# Patient Record
Sex: MALE | Race: WHITE | Employment: UNEMPLOYED | URBAN - METROPOLITAN AREA
[De-identification: names, ages, dates, MRNs, and addresses within clinical notes are randomized per-mention and may not be internally consistent; named-entity substitution may affect disease eponyms.]

---

## 2017-09-06 ENCOUNTER — APPOINTMENT (OUTPATIENT)
Dept: GENERAL RADIOLOGY | Age: 47
DRG: 473 | End: 2017-09-06
Payer: COMMERCIAL

## 2017-09-06 ENCOUNTER — APPOINTMENT (OUTPATIENT)
Dept: CT IMAGING | Age: 47
DRG: 473 | End: 2017-09-06
Payer: COMMERCIAL

## 2017-09-06 ENCOUNTER — APPOINTMENT (OUTPATIENT)
Dept: MRI IMAGING | Age: 47
DRG: 473 | End: 2017-09-06
Payer: COMMERCIAL

## 2017-09-06 ENCOUNTER — HOSPITAL ENCOUNTER (INPATIENT)
Age: 47
LOS: 1 days | Discharge: HOME | DRG: 473 | End: 2017-09-08
Attending: EMERGENCY MEDICINE | Admitting: SURGERY
Payer: COMMERCIAL

## 2017-09-06 DIAGNOSIS — S12.501A CLOSED NONDISPLACED FRACTURE OF SIXTH CERVICAL VERTEBRA, UNSPECIFIED FRACTURE MORPHOLOGY, INITIAL ENCOUNTER (HCC): Primary | ICD-10-CM

## 2017-09-06 PROBLEM — S01.01XA SCALP LACERATION: Status: ACTIVE | Noted: 2017-09-06

## 2017-09-06 PROBLEM — S12.9XXA CERVICAL TRANSVERSE PROCESS FRACTURE (HCC): Status: ACTIVE | Noted: 2017-09-06

## 2017-09-06 LAB
ABSOLUTE EOS #: 0.1 K/UL (ref 0–0.4)
ABSOLUTE LYMPH #: 2 K/UL (ref 1–4.8)
ABSOLUTE MONO #: 1.3 K/UL (ref 0.1–1.2)
ANION GAP SERPL CALCULATED.3IONS-SCNC: 13 MMOL/L (ref 9–17)
BASOPHILS # BLD: 0 %
BASOPHILS ABSOLUTE: 0 K/UL (ref 0–0.2)
BUN BLDV-MCNC: 15 MG/DL (ref 6–20)
BUN/CREAT BLD: ABNORMAL (ref 9–20)
CALCIUM SERPL-MCNC: 8.5 MG/DL (ref 8.6–10.4)
CHLORIDE BLD-SCNC: 102 MMOL/L (ref 98–107)
CO2: 23 MMOL/L (ref 20–31)
COLLAGEN ADENOSINE-5'-DIPHOSPHATE (ADP) TIME: 76 SEC (ref 67–112)
COLLAGEN EPINEPHRINE TIME: 80 SEC (ref 85–172)
CREAT SERPL-MCNC: 0.91 MG/DL (ref 0.7–1.2)
DIFFERENTIAL TYPE: ABNORMAL
EOSINOPHILS RELATIVE PERCENT: 1 %
GFR AFRICAN AMERICAN: >60 ML/MIN
GFR NON-AFRICAN AMERICAN: >60 ML/MIN
GFR SERPL CREATININE-BSD FRML MDRD: ABNORMAL ML/MIN/{1.73_M2}
GFR SERPL CREATININE-BSD FRML MDRD: ABNORMAL ML/MIN/{1.73_M2}
GLUCOSE BLD-MCNC: 139 MG/DL (ref 70–99)
HCT VFR BLD CALC: 43.8 % (ref 41–53)
HEMOGLOBIN: 15.1 G/DL (ref 13.5–17.5)
INR BLD: 1
LYMPHOCYTES # BLD: 15 %
MCH RBC QN AUTO: 30.4 PG (ref 26–34)
MCHC RBC AUTO-ENTMCNC: 34.4 G/DL (ref 31–37)
MCV RBC AUTO: 88.3 FL (ref 80–100)
MONOCYTES # BLD: 9 %
PARTIAL THROMBOPLASTIN TIME: 25.5 SEC (ref 21.3–31.3)
PDW BLD-RTO: 13.7 % (ref 12.5–15.4)
PLATELET # BLD: 207 K/UL (ref 140–450)
PLATELET ESTIMATE: ABNORMAL
PLATELET FUNCTION INTERP: ABNORMAL
PMV BLD AUTO: 7.3 FL (ref 6–12)
POTASSIUM SERPL-SCNC: 3.8 MMOL/L (ref 3.7–5.3)
PROTHROMBIN TIME: 10.4 SEC (ref 9.4–12.6)
RBC # BLD: 4.96 M/UL (ref 4.5–5.9)
RBC # BLD: ABNORMAL 10*6/UL
SEG NEUTROPHILS: 75 %
SEGMENTED NEUTROPHILS ABSOLUTE COUNT: 10.2 K/UL (ref 1.8–7.7)
SODIUM BLD-SCNC: 138 MMOL/L (ref 135–144)
WBC # BLD: 13.6 K/UL (ref 3.5–11)
WBC # BLD: ABNORMAL 10*3/UL

## 2017-09-06 PROCEDURE — 71010 XR CHEST PORTABLE: CPT

## 2017-09-06 PROCEDURE — 85025 COMPLETE CBC W/AUTO DIFF WBC: CPT

## 2017-09-06 PROCEDURE — 6370000000 HC RX 637 (ALT 250 FOR IP): Performed by: STUDENT IN AN ORGANIZED HEALTH CARE EDUCATION/TRAINING PROGRAM

## 2017-09-06 PROCEDURE — 99221 1ST HOSP IP/OBS SF/LOW 40: CPT | Performed by: NEUROLOGICAL SURGERY

## 2017-09-06 PROCEDURE — G0378 HOSPITAL OBSERVATION PER HR: HCPCS

## 2017-09-06 PROCEDURE — 73030 X-RAY EXAM OF SHOULDER: CPT

## 2017-09-06 PROCEDURE — 85576 BLOOD PLATELET AGGREGATION: CPT

## 2017-09-06 PROCEDURE — 2580000003 HC RX 258: Performed by: STUDENT IN AN ORGANIZED HEALTH CARE EDUCATION/TRAINING PROGRAM

## 2017-09-06 PROCEDURE — 73060 X-RAY EXAM OF HUMERUS: CPT

## 2017-09-06 PROCEDURE — 2500000003 HC RX 250 WO HCPCS: Performed by: STUDENT IN AN ORGANIZED HEALTH CARE EDUCATION/TRAINING PROGRAM

## 2017-09-06 PROCEDURE — 72141 MRI NECK SPINE W/O DYE: CPT

## 2017-09-06 PROCEDURE — 99285 EMERGENCY DEPT VISIT HI MDM: CPT

## 2017-09-06 PROCEDURE — 6360000002 HC RX W HCPCS: Performed by: EMERGENCY MEDICINE

## 2017-09-06 PROCEDURE — 70498 CT ANGIOGRAPHY NECK: CPT

## 2017-09-06 PROCEDURE — 85730 THROMBOPLASTIN TIME PARTIAL: CPT

## 2017-09-06 PROCEDURE — 6360000004 HC RX CONTRAST MEDICATION: Performed by: EMERGENCY MEDICINE

## 2017-09-06 PROCEDURE — 85610 PROTHROMBIN TIME: CPT

## 2017-09-06 PROCEDURE — 36415 COLL VENOUS BLD VENIPUNCTURE: CPT

## 2017-09-06 PROCEDURE — 80048 BASIC METABOLIC PNL TOTAL CA: CPT

## 2017-09-06 PROCEDURE — 96375 TX/PRO/DX INJ NEW DRUG ADDON: CPT

## 2017-09-06 PROCEDURE — 93005 ELECTROCARDIOGRAM TRACING: CPT

## 2017-09-06 RX ORDER — ACETAMINOPHEN 325 MG/1
650 TABLET ORAL EVERY 4 HOURS PRN
Status: DISCONTINUED | OUTPATIENT
Start: 2017-09-06 | End: 2017-09-08 | Stop reason: HOSPADM

## 2017-09-06 RX ORDER — MORPHINE SULFATE 4 MG/ML
4 INJECTION, SOLUTION INTRAMUSCULAR; INTRAVENOUS ONCE
Status: COMPLETED | OUTPATIENT
Start: 2017-09-06 | End: 2017-09-06

## 2017-09-06 RX ORDER — OXYCODONE HYDROCHLORIDE 5 MG/1
5 TABLET ORAL EVERY 4 HOURS PRN
Status: DISCONTINUED | OUTPATIENT
Start: 2017-09-06 | End: 2017-09-08 | Stop reason: HOSPADM

## 2017-09-06 RX ORDER — OXYCODONE HYDROCHLORIDE 5 MG/1
10 TABLET ORAL EVERY 4 HOURS PRN
Status: DISCONTINUED | OUTPATIENT
Start: 2017-09-06 | End: 2017-09-08 | Stop reason: HOSPADM

## 2017-09-06 RX ORDER — SODIUM CHLORIDE 0.9 % (FLUSH) 0.9 %
10 SYRINGE (ML) INJECTION EVERY 12 HOURS SCHEDULED
Status: DISCONTINUED | OUTPATIENT
Start: 2017-09-06 | End: 2017-09-08 | Stop reason: HOSPADM

## 2017-09-06 RX ORDER — ONDANSETRON 2 MG/ML
4 INJECTION INTRAMUSCULAR; INTRAVENOUS EVERY 6 HOURS PRN
Status: DISCONTINUED | OUTPATIENT
Start: 2017-09-06 | End: 2017-09-08 | Stop reason: HOSPADM

## 2017-09-06 RX ORDER — ONDANSETRON 2 MG/ML
4 INJECTION INTRAMUSCULAR; INTRAVENOUS ONCE
Status: COMPLETED | OUTPATIENT
Start: 2017-09-06 | End: 2017-09-06

## 2017-09-06 RX ORDER — LIDOCAINE HYDROCHLORIDE AND EPINEPHRINE 10; 10 MG/ML; UG/ML
20 INJECTION, SOLUTION INFILTRATION; PERINEURAL ONCE
Status: COMPLETED | OUTPATIENT
Start: 2017-09-06 | End: 2017-09-06

## 2017-09-06 RX ORDER — SODIUM CHLORIDE 9 MG/ML
INJECTION, SOLUTION INTRAVENOUS CONTINUOUS
Status: DISCONTINUED | OUTPATIENT
Start: 2017-09-07 | End: 2017-09-08

## 2017-09-06 RX ORDER — MORPHINE SULFATE 4 MG/ML
4 INJECTION, SOLUTION INTRAMUSCULAR; INTRAVENOUS
Status: DISCONTINUED | OUTPATIENT
Start: 2017-09-06 | End: 2017-09-08

## 2017-09-06 RX ORDER — SODIUM CHLORIDE 0.9 % (FLUSH) 0.9 %
10 SYRINGE (ML) INJECTION PRN
Status: DISCONTINUED | OUTPATIENT
Start: 2017-09-06 | End: 2017-09-08 | Stop reason: HOSPADM

## 2017-09-06 RX ORDER — MORPHINE SULFATE 2 MG/ML
2 INJECTION, SOLUTION INTRAMUSCULAR; INTRAVENOUS
Status: DISCONTINUED | OUTPATIENT
Start: 2017-09-06 | End: 2017-09-08 | Stop reason: HOSPADM

## 2017-09-06 RX ADMIN — MORPHINE SULFATE 4 MG: 4 INJECTION, SOLUTION INTRAMUSCULAR; INTRAVENOUS at 09:58

## 2017-09-06 RX ADMIN — OXYCODONE HYDROCHLORIDE 5 MG: 5 TABLET ORAL at 20:06

## 2017-09-06 RX ADMIN — IOVERSOL 90 ML: 741 INJECTION INTRA-ARTERIAL; INTRAVENOUS at 08:15

## 2017-09-06 RX ADMIN — ONDANSETRON 4 MG: 2 INJECTION, SOLUTION INTRAMUSCULAR; INTRAVENOUS at 09:57

## 2017-09-06 RX ADMIN — OXYCODONE HYDROCHLORIDE 5 MG: 5 TABLET ORAL at 23:37

## 2017-09-06 RX ADMIN — SODIUM CHLORIDE, PRESERVATIVE FREE 10 ML: 5 INJECTION INTRAVENOUS at 20:06

## 2017-09-06 RX ADMIN — LIDOCAINE HYDROCHLORIDE,EPINEPHRINE BITARTRATE 20 ML: 10; .01 INJECTION, SOLUTION INFILTRATION; PERINEURAL at 09:00

## 2017-09-06 RX ADMIN — OXYCODONE HYDROCHLORIDE 5 MG: 5 TABLET ORAL at 12:30

## 2017-09-06 ASSESSMENT — ENCOUNTER SYMPTOMS
SORE THROAT: 0
EYE PAIN: 0
NAUSEA: 0
BACK PAIN: 1
ABDOMINAL PAIN: 0
PHOTOPHOBIA: 0
COUGH: 0
SHORTNESS OF BREATH: 0

## 2017-09-06 ASSESSMENT — PAIN DESCRIPTION - LOCATION
LOCATION: ARM
LOCATION: ARM

## 2017-09-06 ASSESSMENT — PAIN DESCRIPTION - FREQUENCY: FREQUENCY: CONTINUOUS

## 2017-09-06 ASSESSMENT — PAIN DESCRIPTION - DESCRIPTORS
DESCRIPTORS: SORE
DESCRIPTORS: SORE

## 2017-09-06 ASSESSMENT — PAIN SCALES - GENERAL
PAINLEVEL_OUTOF10: 4
PAINLEVEL_OUTOF10: 5
PAINLEVEL_OUTOF10: 5
PAINLEVEL_OUTOF10: 7
PAINLEVEL_OUTOF10: 4
PAINLEVEL_OUTOF10: 3
PAINLEVEL_OUTOF10: 3
PAINLEVEL_OUTOF10: 8

## 2017-09-06 ASSESSMENT — PAIN DESCRIPTION - ORIENTATION
ORIENTATION: LEFT
ORIENTATION: RIGHT

## 2017-09-06 ASSESSMENT — PAIN DESCRIPTION - PAIN TYPE
TYPE: ACUTE PAIN
TYPE: ACUTE PAIN

## 2017-09-06 ASSESSMENT — PAIN DESCRIPTION - PROGRESSION: CLINICAL_PROGRESSION: GRADUALLY IMPROVING

## 2017-09-06 ASSESSMENT — PAIN DESCRIPTION - ONSET: ONSET: ON-GOING

## 2017-09-07 ENCOUNTER — ANESTHESIA (OUTPATIENT)
Dept: OPERATING ROOM | Age: 47
DRG: 473 | End: 2017-09-07
Payer: COMMERCIAL

## 2017-09-07 ENCOUNTER — APPOINTMENT (OUTPATIENT)
Dept: GENERAL RADIOLOGY | Age: 47
DRG: 473 | End: 2017-09-07
Payer: COMMERCIAL

## 2017-09-07 ENCOUNTER — ANESTHESIA EVENT (OUTPATIENT)
Dept: OPERATING ROOM | Age: 47
DRG: 473 | End: 2017-09-07
Payer: COMMERCIAL

## 2017-09-07 VITALS — OXYGEN SATURATION: 100 % | DIASTOLIC BLOOD PRESSURE: 68 MMHG | SYSTOLIC BLOOD PRESSURE: 107 MMHG | TEMPERATURE: 96.8 F

## 2017-09-07 LAB
ANION GAP SERPL CALCULATED.3IONS-SCNC: 9 MMOL/L (ref 9–17)
BILIRUBIN URINE: NEGATIVE
BUN BLDV-MCNC: 14 MG/DL (ref 6–20)
BUN/CREAT BLD: ABNORMAL (ref 9–20)
CALCIUM SERPL-MCNC: 8.2 MG/DL (ref 8.6–10.4)
CHLORIDE BLD-SCNC: 103 MMOL/L (ref 98–107)
CO2: 27 MMOL/L (ref 20–31)
COLOR: ABNORMAL
COMMENT UA: ABNORMAL
CREAT SERPL-MCNC: 0.88 MG/DL (ref 0.7–1.2)
EKG ATRIAL RATE: 77 BPM
EKG P AXIS: 69 DEGREES
EKG P-R INTERVAL: 130 MS
EKG Q-T INTERVAL: 376 MS
EKG QRS DURATION: 106 MS
EKG QTC CALCULATION (BAZETT): 425 MS
EKG R AXIS: 24 DEGREES
EKG T AXIS: 59 DEGREES
EKG VENTRICULAR RATE: 77 BPM
GFR AFRICAN AMERICAN: >60 ML/MIN
GFR NON-AFRICAN AMERICAN: >60 ML/MIN
GFR SERPL CREATININE-BSD FRML MDRD: ABNORMAL ML/MIN/{1.73_M2}
GFR SERPL CREATININE-BSD FRML MDRD: ABNORMAL ML/MIN/{1.73_M2}
GLUCOSE BLD-MCNC: 107 MG/DL (ref 70–99)
GLUCOSE URINE: NEGATIVE
HCT VFR BLD CALC: 43.2 % (ref 41–53)
HCT VFR BLD CALC: 44.6 % (ref 41–53)
HEMOGLOBIN: 14.8 G/DL (ref 13.5–17.5)
HEMOGLOBIN: 15.2 G/DL (ref 13.5–17.5)
KETONES, URINE: NEGATIVE
LEUKOCYTE ESTERASE, URINE: NEGATIVE
MCH RBC QN AUTO: 30.5 PG (ref 26–34)
MCHC RBC AUTO-ENTMCNC: 34.2 G/DL (ref 31–37)
MCV RBC AUTO: 89.2 FL (ref 80–100)
NITRITE, URINE: NEGATIVE
PDW BLD-RTO: 13.9 % (ref 12.5–15.4)
PH UA: 5.5 (ref 5–8)
PLATELET # BLD: 192 K/UL (ref 140–450)
PMV BLD AUTO: 7.4 FL (ref 6–12)
POTASSIUM SERPL-SCNC: 4.3 MMOL/L (ref 3.7–5.3)
PROTEIN UA: NEGATIVE
RBC # BLD: 4.85 M/UL (ref 4.5–5.9)
SODIUM BLD-SCNC: 139 MMOL/L (ref 135–144)
SPECIFIC GRAVITY UA: 1.02 (ref 1–1.03)
TURBIDITY: CLEAR
URINE HGB: NEGATIVE
UROBILINOGEN, URINE: NORMAL
WBC # BLD: 11.5 K/UL (ref 3.5–11)

## 2017-09-07 PROCEDURE — C1713 ANCHOR/SCREW BN/BN,TIS/BN: HCPCS | Performed by: NEUROLOGICAL SURGERY

## 2017-09-07 PROCEDURE — 3600000014 HC SURGERY LEVEL 4 ADDTL 15MIN: Performed by: NEUROLOGICAL SURGERY

## 2017-09-07 PROCEDURE — 2500000003 HC RX 250 WO HCPCS: Performed by: NURSE ANESTHETIST, CERTIFIED REGISTERED

## 2017-09-07 PROCEDURE — 6360000002 HC RX W HCPCS: Performed by: NURSE ANESTHETIST, CERTIFIED REGISTERED

## 2017-09-07 PROCEDURE — 3700000000 HC ANESTHESIA ATTENDED CARE: Performed by: NEUROLOGICAL SURGERY

## 2017-09-07 PROCEDURE — 92523 SPEECH SOUND LANG COMPREHEN: CPT

## 2017-09-07 PROCEDURE — 6370000000 HC RX 637 (ALT 250 FOR IP): Performed by: REGISTERED NURSE

## 2017-09-07 PROCEDURE — 7100000001 HC PACU RECOVERY - ADDTL 15 MIN: Performed by: NEUROLOGICAL SURGERY

## 2017-09-07 PROCEDURE — 87086 URINE CULTURE/COLONY COUNT: CPT

## 2017-09-07 PROCEDURE — 6360000002 HC RX W HCPCS: Performed by: ANESTHESIOLOGY

## 2017-09-07 PROCEDURE — 36415 COLL VENOUS BLD VENIPUNCTURE: CPT

## 2017-09-07 PROCEDURE — 2580000003 HC RX 258: Performed by: NURSE ANESTHETIST, CERTIFIED REGISTERED

## 2017-09-07 PROCEDURE — 6360000002 HC RX W HCPCS: Performed by: REGISTERED NURSE

## 2017-09-07 PROCEDURE — 7100000000 HC PACU RECOVERY - FIRST 15 MIN: Performed by: NEUROLOGICAL SURGERY

## 2017-09-07 PROCEDURE — 0RT30ZZ RESECTION OF CERVICAL VERTEBRAL DISC, OPEN APPROACH: ICD-10-PCS | Performed by: NEUROLOGICAL SURGERY

## 2017-09-07 PROCEDURE — 99024 POSTOP FOLLOW-UP VISIT: CPT | Performed by: NEUROLOGICAL SURGERY

## 2017-09-07 PROCEDURE — 3600000004 HC SURGERY LEVEL 4 BASE: Performed by: NEUROLOGICAL SURGERY

## 2017-09-07 PROCEDURE — 85027 COMPLETE CBC AUTOMATED: CPT

## 2017-09-07 PROCEDURE — 81003 URINALYSIS AUTO W/O SCOPE: CPT

## 2017-09-07 PROCEDURE — 2720000010 HC SURG SUPPLY STERILE: Performed by: NEUROLOGICAL SURGERY

## 2017-09-07 PROCEDURE — 1200000000 HC SEMI PRIVATE

## 2017-09-07 PROCEDURE — C1729 CATH, DRAINAGE: HCPCS | Performed by: NEUROLOGICAL SURGERY

## 2017-09-07 PROCEDURE — 96365 THER/PROPH/DIAG IV INF INIT: CPT

## 2017-09-07 PROCEDURE — 3700000001 HC ADD 15 MINUTES (ANESTHESIA): Performed by: NEUROLOGICAL SURGERY

## 2017-09-07 PROCEDURE — 2500000003 HC RX 250 WO HCPCS: Performed by: NEUROLOGICAL SURGERY

## 2017-09-07 PROCEDURE — 85018 HEMOGLOBIN: CPT

## 2017-09-07 PROCEDURE — 6370000000 HC RX 637 (ALT 250 FOR IP): Performed by: NEUROLOGICAL SURGERY

## 2017-09-07 PROCEDURE — 2580000003 HC RX 258: Performed by: REGISTERED NURSE

## 2017-09-07 PROCEDURE — A4364 ADHESIVE, LIQUID OR EQUAL: HCPCS | Performed by: NEUROLOGICAL SURGERY

## 2017-09-07 PROCEDURE — 2580000003 HC RX 258: Performed by: STUDENT IN AN ORGANIZED HEALTH CARE EDUCATION/TRAINING PROGRAM

## 2017-09-07 PROCEDURE — L8699 PROSTHETIC IMPLANT NOS: HCPCS | Performed by: NEUROLOGICAL SURGERY

## 2017-09-07 PROCEDURE — 2580000003 HC RX 258: Performed by: NEUROLOGICAL SURGERY

## 2017-09-07 PROCEDURE — 72040 X-RAY EXAM NECK SPINE 2-3 VW: CPT

## 2017-09-07 PROCEDURE — G9165 ATTEN CURRENT STATUS: HCPCS

## 2017-09-07 PROCEDURE — 80048 BASIC METABOLIC PNL TOTAL CA: CPT

## 2017-09-07 PROCEDURE — 22853 INSJ BIOMECHANICAL DEVICE: CPT | Performed by: NEUROLOGICAL SURGERY

## 2017-09-07 PROCEDURE — XRG2092 FUSION OF 2 OR MORE CERVICAL VERTEBRAL JOINTS USING NANOTEXTURED SURFACE INTERBODY FUSION DEVICE, OPEN APPROACH, NEW TECHNOLOGY GROUP 2: ICD-10-PCS | Performed by: NEUROLOGICAL SURGERY

## 2017-09-07 PROCEDURE — 22856 TOT DISC ARTHRP 1NTRSPC CRV: CPT | Performed by: NEUROLOGICAL SURGERY

## 2017-09-07 PROCEDURE — 22551 ARTHRD ANT NTRBDY CERVICAL: CPT | Performed by: NEUROLOGICAL SURGERY

## 2017-09-07 PROCEDURE — 85014 HEMATOCRIT: CPT

## 2017-09-07 PROCEDURE — L0190 CERV COLLAR SUPP ADJ CERV BA: HCPCS | Performed by: NEUROLOGICAL SURGERY

## 2017-09-07 PROCEDURE — 22845 INSERT SPINE FIXATION DEVICE: CPT | Performed by: NEUROLOGICAL SURGERY

## 2017-09-07 PROCEDURE — 2780000010 HC IMPLANT OTHER: Performed by: NEUROLOGICAL SURGERY

## 2017-09-07 PROCEDURE — G9166 ATTEN GOAL STATUS: HCPCS

## 2017-09-07 DEVICE — IMPLANTABLE DEVICE
Type: IMPLANTABLE DEVICE | Status: FUNCTIONAL
Brand: TRINICA® TRINICA®

## 2017-09-07 DEVICE — IMPLANTABLE DEVICE: Type: IMPLANTABLE DEVICE | Status: FUNCTIONAL

## 2017-09-07 DEVICE — IMPLANTABLE DEVICE
Type: IMPLANTABLE DEVICE | Status: FUNCTIONAL
Brand: TRABECULAR METAL™

## 2017-09-07 DEVICE — IMPLANTABLE DEVICE
Type: IMPLANTABLE DEVICE | Status: FUNCTIONAL
Brand: INVIZIA®

## 2017-09-07 RX ORDER — ONDANSETRON 2 MG/ML
4 INJECTION INTRAMUSCULAR; INTRAVENOUS
Status: DISCONTINUED | OUTPATIENT
Start: 2017-09-07 | End: 2017-09-07 | Stop reason: HOSPADM

## 2017-09-07 RX ORDER — DIPHENHYDRAMINE HYDROCHLORIDE 50 MG/ML
12.5 INJECTION INTRAMUSCULAR; INTRAVENOUS
Status: DISCONTINUED | OUTPATIENT
Start: 2017-09-07 | End: 2017-09-07 | Stop reason: HOSPADM

## 2017-09-07 RX ORDER — SODIUM CHLORIDE, SODIUM LACTATE, POTASSIUM CHLORIDE, CALCIUM CHLORIDE 600; 310; 30; 20 MG/100ML; MG/100ML; MG/100ML; MG/100ML
INJECTION, SOLUTION INTRAVENOUS CONTINUOUS PRN
Status: DISCONTINUED | OUTPATIENT
Start: 2017-09-07 | End: 2017-09-07 | Stop reason: SDUPTHER

## 2017-09-07 RX ORDER — CYCLOBENZAPRINE HCL 10 MG
10 TABLET ORAL 3 TIMES DAILY PRN
Status: DISCONTINUED | OUTPATIENT
Start: 2017-09-07 | End: 2017-09-08 | Stop reason: HOSPADM

## 2017-09-07 RX ORDER — NALOXONE HYDROCHLORIDE 0.4 MG/ML
INJECTION, SOLUTION INTRAMUSCULAR; INTRAVENOUS; SUBCUTANEOUS PRN
Status: DISCONTINUED | OUTPATIENT
Start: 2017-09-07 | End: 2017-09-07 | Stop reason: SDUPTHER

## 2017-09-07 RX ORDER — MAGNESIUM HYDROXIDE 1200 MG/15ML
LIQUID ORAL CONTINUOUS PRN
Status: DISCONTINUED | OUTPATIENT
Start: 2017-09-07 | End: 2017-09-07 | Stop reason: HOSPADM

## 2017-09-07 RX ORDER — LABETALOL HYDROCHLORIDE 5 MG/ML
5 INJECTION, SOLUTION INTRAVENOUS EVERY 10 MIN PRN
Status: DISCONTINUED | OUTPATIENT
Start: 2017-09-07 | End: 2017-09-07 | Stop reason: HOSPADM

## 2017-09-07 RX ORDER — ACETAMINOPHEN 325 MG/1
650 TABLET ORAL EVERY 4 HOURS PRN
Status: DISCONTINUED | OUTPATIENT
Start: 2017-09-07 | End: 2017-09-08

## 2017-09-07 RX ORDER — ONDANSETRON 2 MG/ML
INJECTION INTRAMUSCULAR; INTRAVENOUS PRN
Status: DISCONTINUED | OUTPATIENT
Start: 2017-09-07 | End: 2017-09-07 | Stop reason: SDUPTHER

## 2017-09-07 RX ORDER — OXYCODONE HYDROCHLORIDE AND ACETAMINOPHEN 5; 325 MG/1; MG/1
2 TABLET ORAL PRN
Status: DISCONTINUED | OUTPATIENT
Start: 2017-09-07 | End: 2017-09-07 | Stop reason: HOSPADM

## 2017-09-07 RX ORDER — SODIUM CHLORIDE 0.9 % (FLUSH) 0.9 %
10 SYRINGE (ML) INJECTION PRN
Status: DISCONTINUED | OUTPATIENT
Start: 2017-09-07 | End: 2017-09-08 | Stop reason: HOSPADM

## 2017-09-07 RX ORDER — SENNA AND DOCUSATE SODIUM 50; 8.6 MG/1; MG/1
1 TABLET, FILM COATED ORAL DAILY
Status: DISCONTINUED | OUTPATIENT
Start: 2017-09-07 | End: 2017-09-08 | Stop reason: HOSPADM

## 2017-09-07 RX ORDER — SODIUM CHLORIDE 9 MG/ML
INJECTION, SOLUTION INTRAVENOUS CONTINUOUS
Status: DISCONTINUED | OUTPATIENT
Start: 2017-09-07 | End: 2017-09-08 | Stop reason: HOSPADM

## 2017-09-07 RX ORDER — LIDOCAINE HYDROCHLORIDE 10 MG/ML
INJECTION, SOLUTION EPIDURAL; INFILTRATION; INTRACAUDAL; PERINEURAL PRN
Status: DISCONTINUED | OUTPATIENT
Start: 2017-09-07 | End: 2017-09-07 | Stop reason: SDUPTHER

## 2017-09-07 RX ORDER — FENTANYL CITRATE 50 UG/ML
25 INJECTION, SOLUTION INTRAMUSCULAR; INTRAVENOUS EVERY 5 MIN PRN
Status: DISCONTINUED | OUTPATIENT
Start: 2017-09-07 | End: 2017-09-07 | Stop reason: HOSPADM

## 2017-09-07 RX ORDER — SODIUM CHLORIDE 0.9 % (FLUSH) 0.9 %
10 SYRINGE (ML) INJECTION EVERY 12 HOURS SCHEDULED
Status: DISCONTINUED | OUTPATIENT
Start: 2017-09-07 | End: 2017-09-08 | Stop reason: HOSPADM

## 2017-09-07 RX ORDER — ROCURONIUM BROMIDE 10 MG/ML
INJECTION, SOLUTION INTRAVENOUS PRN
Status: DISCONTINUED | OUTPATIENT
Start: 2017-09-07 | End: 2017-09-07 | Stop reason: SDUPTHER

## 2017-09-07 RX ORDER — PROPOFOL 10 MG/ML
INJECTION, EMULSION INTRAVENOUS CONTINUOUS PRN
Status: DISCONTINUED | OUTPATIENT
Start: 2017-09-07 | End: 2017-09-07 | Stop reason: SDUPTHER

## 2017-09-07 RX ORDER — PROPOFOL 10 MG/ML
INJECTION, EMULSION INTRAVENOUS PRN
Status: DISCONTINUED | OUTPATIENT
Start: 2017-09-07 | End: 2017-09-07 | Stop reason: SDUPTHER

## 2017-09-07 RX ORDER — DOCUSATE SODIUM 100 MG/1
100 CAPSULE, LIQUID FILLED ORAL 2 TIMES DAILY
Status: DISCONTINUED | OUTPATIENT
Start: 2017-09-07 | End: 2017-09-08

## 2017-09-07 RX ORDER — OXYCODONE HYDROCHLORIDE AND ACETAMINOPHEN 5; 325 MG/1; MG/1
1 TABLET ORAL PRN
Status: DISCONTINUED | OUTPATIENT
Start: 2017-09-07 | End: 2017-09-07 | Stop reason: HOSPADM

## 2017-09-07 RX ORDER — VANCOMYCIN HYDROCHLORIDE 1 G/200ML
1000 INJECTION, SOLUTION INTRAVENOUS EVERY 12 HOURS
Status: COMPLETED | OUTPATIENT
Start: 2017-09-07 | End: 2017-09-08

## 2017-09-07 RX ORDER — MORPHINE SULFATE 2 MG/ML
2 INJECTION, SOLUTION INTRAMUSCULAR; INTRAVENOUS EVERY 5 MIN PRN
Status: DISCONTINUED | OUTPATIENT
Start: 2017-09-07 | End: 2017-09-07 | Stop reason: HOSPADM

## 2017-09-07 RX ORDER — CLINDAMYCIN PHOSPHATE 150 MG/ML
INJECTION, SOLUTION INTRAVENOUS PRN
Status: DISCONTINUED | OUTPATIENT
Start: 2017-09-07 | End: 2017-09-07 | Stop reason: SDUPTHER

## 2017-09-07 RX ORDER — DEXAMETHASONE SODIUM PHOSPHATE 10 MG/ML
INJECTION INTRAMUSCULAR; INTRAVENOUS PRN
Status: DISCONTINUED | OUTPATIENT
Start: 2017-09-07 | End: 2017-09-07 | Stop reason: SDUPTHER

## 2017-09-07 RX ORDER — GLYCOPYRROLATE 0.2 MG/ML
INJECTION INTRAMUSCULAR; INTRAVENOUS PRN
Status: DISCONTINUED | OUTPATIENT
Start: 2017-09-07 | End: 2017-09-07 | Stop reason: SDUPTHER

## 2017-09-07 RX ORDER — MIDAZOLAM HYDROCHLORIDE 1 MG/ML
INJECTION INTRAMUSCULAR; INTRAVENOUS PRN
Status: DISCONTINUED | OUTPATIENT
Start: 2017-09-07 | End: 2017-09-07 | Stop reason: SDUPTHER

## 2017-09-07 RX ORDER — MIDAZOLAM HYDROCHLORIDE 1 MG/ML
0.5 INJECTION INTRAMUSCULAR; INTRAVENOUS 4 TIMES DAILY PRN
Status: DISCONTINUED | OUTPATIENT
Start: 2017-09-07 | End: 2017-09-08

## 2017-09-07 RX ORDER — FENTANYL CITRATE 50 UG/ML
INJECTION, SOLUTION INTRAMUSCULAR; INTRAVENOUS PRN
Status: DISCONTINUED | OUTPATIENT
Start: 2017-09-07 | End: 2017-09-07 | Stop reason: SDUPTHER

## 2017-09-07 RX ADMIN — NALOXONE HYDROCHLORIDE 0.1 MG: 0.4 INJECTION, SOLUTION INTRAMUSCULAR; INTRAVENOUS; SUBCUTANEOUS at 15:30

## 2017-09-07 RX ADMIN — SODIUM CHLORIDE, POTASSIUM CHLORIDE, SODIUM LACTATE AND CALCIUM CHLORIDE: 600; 310; 30; 20 INJECTION, SOLUTION INTRAVENOUS at 13:37

## 2017-09-07 RX ADMIN — LIDOCAINE HYDROCHLORIDE 50 MG: 10 INJECTION, SOLUTION EPIDURAL; INFILTRATION; INTRACAUDAL; PERINEURAL at 11:41

## 2017-09-07 RX ADMIN — FENTANYL CITRATE 50 MCG: 50 INJECTION INTRAMUSCULAR; INTRAVENOUS at 11:35

## 2017-09-07 RX ADMIN — SODIUM CHLORIDE, POTASSIUM CHLORIDE, SODIUM LACTATE AND CALCIUM CHLORIDE: 600; 310; 30; 20 INJECTION, SOLUTION INTRAVENOUS at 11:32

## 2017-09-07 RX ADMIN — GLYCOPYRROLATE 0.4 MG: 0.2 INJECTION, SOLUTION INTRAMUSCULAR; INTRAVENOUS at 11:44

## 2017-09-07 RX ADMIN — DOCUSATE SODIUM -SENNOSIDES 1 TABLET: 50; 8.6 TABLET, COATED ORAL at 20:30

## 2017-09-07 RX ADMIN — NALOXONE HYDROCHLORIDE 0.1 MG: 0.4 INJECTION, SOLUTION INTRAMUSCULAR; INTRAVENOUS; SUBCUTANEOUS at 15:38

## 2017-09-07 RX ADMIN — SODIUM CHLORIDE, POTASSIUM CHLORIDE, SODIUM LACTATE AND CALCIUM CHLORIDE: 600; 310; 30; 20 INJECTION, SOLUTION INTRAVENOUS at 11:48

## 2017-09-07 RX ADMIN — NEOSTIGMINE METHYLSULFATE 3 MG: 1 INJECTION, SOLUTION INTRAMUSCULAR; INTRAVENOUS; SUBCUTANEOUS at 15:16

## 2017-09-07 RX ADMIN — LIDOCAINE HYDROCHLORIDE 50 MG: 10 INJECTION, SOLUTION EPIDURAL; INFILTRATION; INTRACAUDAL; PERINEURAL at 14:20

## 2017-09-07 RX ADMIN — ROCURONIUM BROMIDE 20 MG: 10 INJECTION INTRAVENOUS at 12:20

## 2017-09-07 RX ADMIN — PHENYLEPHRINE HYDROCHLORIDE 100 MCG: 10 INJECTION INTRAMUSCULAR; INTRAVENOUS; SUBCUTANEOUS at 14:15

## 2017-09-07 RX ADMIN — MIDAZOLAM HYDROCHLORIDE 0.5 MG: 1 INJECTION, SOLUTION INTRAMUSCULAR; INTRAVENOUS at 10:16

## 2017-09-07 RX ADMIN — SODIUM CHLORIDE: 9 INJECTION, SOLUTION INTRAVENOUS at 10:17

## 2017-09-07 RX ADMIN — PROPOFOL 200 MG: 10 INJECTION, EMULSION INTRAVENOUS at 11:41

## 2017-09-07 RX ADMIN — SODIUM CHLORIDE: 9 INJECTION, SOLUTION INTRAVENOUS at 00:37

## 2017-09-07 RX ADMIN — CLINDAMYCIN PHOSPHATE 900 MG: 150 INJECTION, SOLUTION INTRAMUSCULAR; INTRAVENOUS at 11:50

## 2017-09-07 RX ADMIN — VANCOMYCIN HYDROCHLORIDE 1000 MG: 1 INJECTION, SOLUTION INTRAVENOUS at 19:32

## 2017-09-07 RX ADMIN — ROCURONIUM BROMIDE 30 MG: 10 INJECTION INTRAVENOUS at 11:41

## 2017-09-07 RX ADMIN — ONDANSETRON 4 MG: 2 INJECTION, SOLUTION INTRAMUSCULAR; INTRAVENOUS at 14:58

## 2017-09-07 RX ADMIN — GLYCOPYRROLATE 0.4 MG: 0.2 INJECTION, SOLUTION INTRAMUSCULAR; INTRAVENOUS at 15:16

## 2017-09-07 RX ADMIN — NALOXONE HYDROCHLORIDE 0.1 MG: 0.4 INJECTION, SOLUTION INTRAMUSCULAR; INTRAVENOUS; SUBCUTANEOUS at 15:34

## 2017-09-07 RX ADMIN — PROPOFOL 100 MCG/KG/MIN: 10 INJECTION, EMULSION INTRAVENOUS at 11:55

## 2017-09-07 RX ADMIN — PHENYLEPHRINE HYDROCHLORIDE 100 MCG: 10 INJECTION INTRAMUSCULAR; INTRAVENOUS; SUBCUTANEOUS at 13:35

## 2017-09-07 RX ADMIN — DEXAMETHASONE SODIUM PHOSPHATE 10 MG: 10 INJECTION INTRAMUSCULAR; INTRAVENOUS at 12:10

## 2017-09-07 RX ADMIN — PHENYLEPHRINE HYDROCHLORIDE 100 MCG: 10 INJECTION INTRAMUSCULAR; INTRAVENOUS; SUBCUTANEOUS at 13:25

## 2017-09-07 RX ADMIN — DOCUSATE SODIUM 100 MG: 100 CAPSULE ORAL at 20:30

## 2017-09-07 RX ADMIN — PHENYLEPHRINE HYDROCHLORIDE 100 MCG: 10 INJECTION INTRAMUSCULAR; INTRAVENOUS; SUBCUTANEOUS at 13:50

## 2017-09-07 RX ADMIN — SUFENTANIL CITRATE 0.5 MCG/KG/HR: 50 INJECTION EPIDURAL; INTRAVENOUS at 11:55

## 2017-09-07 RX ADMIN — ROCURONIUM BROMIDE 10 MG: 10 INJECTION INTRAVENOUS at 14:20

## 2017-09-07 RX ADMIN — SODIUM CHLORIDE: 9 INJECTION, SOLUTION INTRAVENOUS at 19:32

## 2017-09-07 RX ADMIN — MIDAZOLAM HYDROCHLORIDE 2 MG: 1 INJECTION, SOLUTION INTRAMUSCULAR; INTRAVENOUS at 11:33

## 2017-09-07 RX ADMIN — SODIUM CHLORIDE, PRESERVATIVE FREE 10 ML: 5 INJECTION INTRAVENOUS at 20:30

## 2017-09-07 RX ADMIN — FENTANYL CITRATE 100 MCG: 50 INJECTION INTRAMUSCULAR; INTRAVENOUS at 11:45

## 2017-09-07 ASSESSMENT — PAIN DESCRIPTION - FREQUENCY: FREQUENCY: CONTINUOUS

## 2017-09-07 ASSESSMENT — PAIN DESCRIPTION - LOCATION: LOCATION: NECK

## 2017-09-07 ASSESSMENT — PAIN DESCRIPTION - ORIENTATION: ORIENTATION: ANTERIOR

## 2017-09-07 ASSESSMENT — PAIN SCALES - GENERAL: PAINLEVEL_OUTOF10: 3

## 2017-09-07 ASSESSMENT — PAIN DESCRIPTION - DESCRIPTORS: DESCRIPTORS: DISCOMFORT

## 2017-09-07 ASSESSMENT — PAIN DESCRIPTION - PAIN TYPE: TYPE: SURGICAL PAIN

## 2017-09-07 ASSESSMENT — PAIN - FUNCTIONAL ASSESSMENT: PAIN_FUNCTIONAL_ASSESSMENT: 0-10

## 2017-09-08 ENCOUNTER — APPOINTMENT (OUTPATIENT)
Dept: GENERAL RADIOLOGY | Age: 47
DRG: 473 | End: 2017-09-08
Payer: COMMERCIAL

## 2017-09-08 VITALS
WEIGHT: 180 LBS | SYSTOLIC BLOOD PRESSURE: 143 MMHG | DIASTOLIC BLOOD PRESSURE: 77 MMHG | RESPIRATION RATE: 13 BRPM | TEMPERATURE: 98.2 F | HEIGHT: 70 IN | OXYGEN SATURATION: 95 % | BODY MASS INDEX: 25.77 KG/M2 | HEART RATE: 77 BPM

## 2017-09-08 LAB
ALBUMIN SERPL-MCNC: 3.3 G/DL (ref 3.5–5.2)
ALBUMIN/GLOBULIN RATIO: 1.3 (ref 1–2.5)
ALP BLD-CCNC: 41 U/L (ref 40–129)
ALT SERPL-CCNC: 32 U/L (ref 5–41)
ANION GAP SERPL CALCULATED.3IONS-SCNC: 11 MMOL/L (ref 9–17)
AST SERPL-CCNC: 53 U/L
BILIRUB SERPL-MCNC: 0.64 MG/DL (ref 0.3–1.2)
BUN BLDV-MCNC: 9 MG/DL (ref 6–20)
BUN/CREAT BLD: ABNORMAL (ref 9–20)
CALCIUM SERPL-MCNC: 7.6 MG/DL (ref 8.6–10.4)
CHLORIDE BLD-SCNC: 105 MMOL/L (ref 98–107)
CO2: 21 MMOL/L (ref 20–31)
CREAT SERPL-MCNC: 0.6 MG/DL (ref 0.7–1.2)
CULTURE: NO GROWTH
CULTURE: NORMAL
GFR AFRICAN AMERICAN: >60 ML/MIN
GFR NON-AFRICAN AMERICAN: >60 ML/MIN
GFR SERPL CREATININE-BSD FRML MDRD: ABNORMAL ML/MIN/{1.73_M2}
GFR SERPL CREATININE-BSD FRML MDRD: ABNORMAL ML/MIN/{1.73_M2}
GLUCOSE BLD-MCNC: 130 MG/DL (ref 70–99)
HCT VFR BLD CALC: 39.2 % (ref 41–53)
HEMOGLOBIN: 13.5 G/DL (ref 13.5–17.5)
Lab: NORMAL
MCH RBC QN AUTO: 30.6 PG (ref 26–34)
MCHC RBC AUTO-ENTMCNC: 34.4 G/DL (ref 31–37)
MCV RBC AUTO: 88.9 FL (ref 80–100)
PDW BLD-RTO: 13.7 % (ref 12.5–15.4)
PLATELET # BLD: 205 K/UL (ref 140–450)
PMV BLD AUTO: 7.5 FL (ref 6–12)
POTASSIUM SERPL-SCNC: 3.7 MMOL/L (ref 3.7–5.3)
RBC # BLD: 4.41 M/UL (ref 4.5–5.9)
SODIUM BLD-SCNC: 137 MMOL/L (ref 135–144)
SPECIMEN DESCRIPTION: NORMAL
STATUS: NORMAL
TOTAL PROTEIN: 5.9 G/DL (ref 6.4–8.3)
WBC # BLD: 19.6 K/UL (ref 3.5–11)

## 2017-09-08 PROCEDURE — 80053 COMPREHEN METABOLIC PANEL: CPT

## 2017-09-08 PROCEDURE — G8987 SELF CARE CURRENT STATUS: HCPCS

## 2017-09-08 PROCEDURE — 99024 POSTOP FOLLOW-UP VISIT: CPT | Performed by: NEUROLOGICAL SURGERY

## 2017-09-08 PROCEDURE — 36415 COLL VENOUS BLD VENIPUNCTURE: CPT

## 2017-09-08 PROCEDURE — G8978 MOBILITY CURRENT STATUS: HCPCS

## 2017-09-08 PROCEDURE — 97535 SELF CARE MNGMENT TRAINING: CPT

## 2017-09-08 PROCEDURE — G8988 SELF CARE GOAL STATUS: HCPCS

## 2017-09-08 PROCEDURE — G8989 SELF CARE D/C STATUS: HCPCS

## 2017-09-08 PROCEDURE — 99406 BEHAV CHNG SMOKING 3-10 MIN: CPT

## 2017-09-08 PROCEDURE — 85027 COMPLETE CBC AUTOMATED: CPT

## 2017-09-08 PROCEDURE — 72040 X-RAY EXAM NECK SPINE 2-3 VW: CPT

## 2017-09-08 PROCEDURE — 96366 THER/PROPH/DIAG IV INF ADDON: CPT

## 2017-09-08 PROCEDURE — G8979 MOBILITY GOAL STATUS: HCPCS

## 2017-09-08 PROCEDURE — 6360000002 HC RX W HCPCS: Performed by: REGISTERED NURSE

## 2017-09-08 PROCEDURE — 97116 GAIT TRAINING THERAPY: CPT

## 2017-09-08 PROCEDURE — 97165 OT EVAL LOW COMPLEX 30 MIN: CPT

## 2017-09-08 PROCEDURE — G8980 MOBILITY D/C STATUS: HCPCS

## 2017-09-08 PROCEDURE — 6370000000 HC RX 637 (ALT 250 FOR IP): Performed by: STUDENT IN AN ORGANIZED HEALTH CARE EDUCATION/TRAINING PROGRAM

## 2017-09-08 PROCEDURE — 97161 PT EVAL LOW COMPLEX 20 MIN: CPT

## 2017-09-08 PROCEDURE — 2580000003 HC RX 258: Performed by: STUDENT IN AN ORGANIZED HEALTH CARE EDUCATION/TRAINING PROGRAM

## 2017-09-08 RX ORDER — OXYCODONE HYDROCHLORIDE 5 MG/1
5 TABLET ORAL EVERY 6 HOURS PRN
Qty: 28 TABLET | Refills: 0 | Status: SHIPPED | OUTPATIENT
Start: 2017-09-08 | End: 2017-09-15

## 2017-09-08 RX ADMIN — OXYCODONE HYDROCHLORIDE 5 MG: 5 TABLET ORAL at 02:10

## 2017-09-08 RX ADMIN — SODIUM CHLORIDE, PRESERVATIVE FREE 10 ML: 5 INJECTION INTRAVENOUS at 09:05

## 2017-09-08 RX ADMIN — VANCOMYCIN HYDROCHLORIDE 1000 MG: 1 INJECTION, SOLUTION INTRAVENOUS at 06:12

## 2017-09-08 ASSESSMENT — PAIN SCALES - GENERAL
PAINLEVEL_OUTOF10: 6
PAINLEVEL_OUTOF10: 0

## 2017-09-19 ENCOUNTER — OFFICE VISIT (OUTPATIENT)
Dept: SURGERY | Age: 47
End: 2017-09-19
Payer: COMMERCIAL

## 2017-09-19 VITALS
HEIGHT: 70 IN | WEIGHT: 176 LBS | DIASTOLIC BLOOD PRESSURE: 80 MMHG | BODY MASS INDEX: 25.2 KG/M2 | SYSTOLIC BLOOD PRESSURE: 115 MMHG | HEART RATE: 106 BPM

## 2017-09-19 DIAGNOSIS — S01.01XD SCALP LACERATION, SUBSEQUENT ENCOUNTER: Primary | ICD-10-CM

## 2017-09-19 PROCEDURE — 99212 OFFICE O/P EST SF 10 MIN: CPT | Performed by: SPECIALIST

## 2017-09-19 RX ORDER — OXYCODONE HYDROCHLORIDE 5 MG/1
5 TABLET ORAL EVERY 4 HOURS PRN
COMMUNITY
End: 2018-12-19 | Stop reason: ALTCHOICE

## 2017-09-25 ENCOUNTER — OFFICE VISIT (OUTPATIENT)
Dept: NEUROSURGERY | Age: 47
End: 2017-09-25

## 2017-09-25 VITALS
WEIGHT: 177 LBS | SYSTOLIC BLOOD PRESSURE: 112 MMHG | BODY MASS INDEX: 25.4 KG/M2 | DIASTOLIC BLOOD PRESSURE: 73 MMHG | HEART RATE: 80 BPM

## 2017-09-25 DIAGNOSIS — Z98.1 S/P CERVICAL SPINAL FUSION: Primary | ICD-10-CM

## 2017-09-25 PROCEDURE — 99024 POSTOP FOLLOW-UP VISIT: CPT | Performed by: NEUROLOGICAL SURGERY

## 2017-10-05 ENCOUNTER — HOSPITAL ENCOUNTER (OUTPATIENT)
Dept: PHYSICAL THERAPY | Age: 47
Setting detail: THERAPIES SERIES
Discharge: HOME | End: 2017-10-05
Payer: COMMERCIAL

## 2017-10-05 PROCEDURE — 97530 THERAPEUTIC ACTIVITIES: CPT

## 2017-10-05 PROCEDURE — 97110 THERAPEUTIC EXERCISES: CPT

## 2017-10-05 PROCEDURE — 97161 PT EVAL LOW COMPLEX 20 MIN: CPT
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
## 2017-10-10 ENCOUNTER — HOSPITAL ENCOUNTER (OUTPATIENT)
Dept: PHYSICAL THERAPY | Age: 47
Setting detail: THERAPIES SERIES
Discharge: HOME | End: 2017-10-10
Payer: COMMERCIAL

## 2017-10-10 PROCEDURE — 97140 MANUAL THERAPY 1/> REGIONS: CPT

## 2017-10-10 PROCEDURE — 97110 THERAPEUTIC EXERCISES: CPT

## 2017-10-10 NOTE — FLOWSHEET NOTE
[x] [facility]       Outpatient Physical        Therapy       [address]       Phone: [phone]       Fax: [phone] [] [facility] Promotion at [address] [facility]       Phone: [phone]       Fax: [phone] [] [facility] for Health Promotion  [address] [facility]   Phone: [phone]   Fax:  [phone]     Physical Therapy Daily Treatment Note    Date:  10/10/2017  Patient Name:  Salas Lin    :  1970  MRN: [id number]  Physician: [facility]                    Insurance: [address] approved 2-3x for 6-8 weeks from 10-2 to 17  Medical Diagnosis: closed nondisplaced fx C6 S12.591, 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, herniation of disc w/kflbsvscokxmvP95.10                                 Rehab Codes: pain M54.2, myofascial M79.1,M62.838, weakness M62.592  Onset Date: 17                                    Next 's appt.:   Visit# / total visits:   Cancels/No Shows:  0/0    Subjective:    Pain:  [x] Yes  [] No Location: left shoulder/arm and posterior neck Pain Rating: (0-10 scale) 2/10  Pain altered Tx:  [x] No  [] Yes  Action:  Comments:    Objective:   Modalities:   Precautions: No neck ROM or strengthening per , Wear collar all times Surgery 17.   Exercises:  Exercise Reps/ Time Weight/ Level Comments   Posture x   Reviewed in sitting with lumbar roll and why this aligned position is beneficial with retracted scapula and  head balanced,       UBE- fwd/bkd 6 min  L 3 added   Ball on wall      Wax on/wax off 1 min  added   Corner stretch- 3 levels   5 x ea  10-20 sec added         Supine      Flex  10 x  1 lb cane      ER/IR  10 x  \"  added   Protraction/retraction  10 x  \"     horiz abd/add  10 x  \"           Seated      Pulleys- 2 ways  1 min   added   Scapular retraction 10 x   added                                 Other: Manual- Educated and instructed in Trigger points and deep pressure releases-  Trial right upper trap and left upper trap 2 areas with good releases    Specific Instructions for next treatment:Emphasis- Left UE strengthening, myofacial work upper trap/scapula, posture alignment    Treatment Charges: Mins Units   []  Modalities     [x]  Ther Exercise 40 3   [x]  Manual Therapy 13 1   []  Ther Activities     []  Aquatics     []  Vasocompression     []  Other     Total Treatment time 53        Assessment: [x] Progressing toward goals. [] No change. [] Other:     STG: (to be met in 10 treatments)  1. Pain: No increase in cervical pain with exercises  2. ? ROM: No loss of shoulder ROM  3. ? Strength: Test at 4+-5 in 3 motions without pain, Reports less heaviness in left arm  4. ? Function: able to reach over head with repetition, able to increase endurance exercises by 5 min (UBE)  5. Posture- improved alignment with upright posture  6. Spasms- min to none and able to manage if they recur  LTG: (to be met in 16 treatments)        1. Independent with Home Exercise Programs     Patient goals: To fully recover.       Pt. Education:  [x] Yes  [] No  [] Reviewed Prior HEP/Ed  Method of Education: [x] Verbal  [x] Demo  [] Written  Comprehension of Education:  [x] Verbalizes understanding. [x] Demonstrates understanding. [] Needs review. [] Demonstrates/verbalizes HEP/Ed previously given. Plan: [x] Continue per plan of care.    [] Other:      Time In: 0802            Time Out: 0900    Electronically signed by:  Wilma Carbone, PT

## 2017-10-12 ENCOUNTER — HOSPITAL ENCOUNTER (OUTPATIENT)
Dept: PHYSICAL THERAPY | Age: 47
Setting detail: THERAPIES SERIES
Discharge: HOME | End: 2017-10-12
Payer: COMMERCIAL

## 2017-10-12 PROCEDURE — 97140 MANUAL THERAPY 1/> REGIONS: CPT

## 2017-10-12 PROCEDURE — 97110 THERAPEUTIC EXERCISES: CPT

## 2017-10-12 NOTE — FLOWSHEET NOTE
[x] [facility]       Outpatient Physical        Therapy       [address]       Phone: [phone]       Fax: [phone] [] [facility] Promotion at [address]       Phone: [phone]       Fax: [phone] [] [facility]. [address] [place] [address]  [address] [address]   Phone: [phone]   Fax:  [phone]     Physical Therapy Daily Treatment Note    Date:  10/12/2017  Patient Name:  Herbert Avila    :  1970  MRN: [id number]  Physician: [facility]                    Insurance: [address] approved 2-3x for 6-8 weeks from 10-2 to 17  Medical Diagnosis: closed nondisplaced fx C6 S12.591, 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, herniation of disc w/ounvxwkchjusmZ14.10                                 Rehab Codes: pain M54.2, myofascial M79.1,M62.838, weakness M62.592  Onset Date: 17                                    Next 's appt.:   Visit# / total visits: 3/16  Cancels/No Shows:  0/0    Subjective:    Pain:  [x] Yes  [] No Location: left shoulder/arm and posterior neck Pain Rating: (0-10 scale) 2/10  Pain altered Tx:  [x] No  [] Yes  Action:  Comments:Pt reports overall soreness and not sleeping well. Objective:   Modalities:   Precautions: No neck ROM or strengthening per , Wear collar all times Surgery 17.   Exercises:  Exercise Reps/ Time Weight/ Level Comments   Posture x   Reviewed in sitting with lumbar roll and why this aligned position is beneficial with retracted scapula and  head balanced,       UBE- fwd/bkd 6 min  L 3    Ball on wall 10 x  Green ball, flex/ext, horiz abd/add  Added 10/12   Wax on/wax off 1 min     Corner stretch- 3 levels   5 x ea  10-20 sec          Supine      Flex  12 x  1 lb cane  increased reps 10/12    ER/IR  12 x  \"     Protraction/retraction  12 x  \"     horiz abd/add  12 x  \"     Chest press  12x  \" Added 10/12 Seated      Pulleys- 2 ways  1 min      Scapular retraction 10 x                                    Other: Manual- Bilateral upper trap/scapular massage with release. Specific Instructions for next treatment:Emphasis- Left UE strengthening, myofacial work upper trap/scapula, posture alignment    Treatment Charges: Mins Units   []  Modalities     [x]  Ther Exercise 35 2   [x]  Manual Therapy 10 1   []  Ther Activities     []  Aquatics     []  Vasocompression     []  Other     Total Treatment time 45        Assessment: [x] Progressing toward goals. [] No change. [x] Other:Able to increase reps and add chest press and ball on wall with good tolerance. Pt stated felt more relaxed after therapy today, discomfort remained at 2/10. STG: (to be met in 10 treatments)  1. Pain: No increase in cervical pain with exercises  2. ? ROM: No loss of shoulder ROM  3. ? Strength: Test at 4+-5 in 3 motions without pain, Reports less heaviness in left arm  4. ? Function: able to reach over head with repetition, able to increase endurance exercises by 5 min (UBE)  5. Posture- improved alignment with upright posture  6. Spasms- min to none and able to manage if they recur  LTG: (to be met in 16 treatments)        1. Independent with Home Exercise Programs     Patient goals: To fully recover.       Pt. Education:  [x] Yes  [] No  [] Reviewed Prior HEP/Ed  Method of Education: [x] Verbal  [x] Demo  [] Written  Comprehension of Education:  [x] Verbalizes understanding. [x] Demonstrates understanding. [] Needs review. [] Demonstrates/verbalizes HEP/Ed previously given. Plan: [x] Continue per plan of care.    [] Other:      Time In: 9955            Time Out: 1239    Electronically signed by:  Reece Hernandez PTA

## 2017-10-17 ENCOUNTER — HOSPITAL ENCOUNTER (OUTPATIENT)
Dept: PHYSICAL THERAPY | Age: 47
Setting detail: THERAPIES SERIES
Discharge: HOME | End: 2017-10-17
Payer: COMMERCIAL

## 2017-10-17 PROCEDURE — 97110 THERAPEUTIC EXERCISES: CPT

## 2017-10-17 NOTE — FLOWSHEET NOTE
[x] Josie Florez       Outpatient Physical        Therapy       [address]       Phone: [phone]       Fax: [phone] [] [facility] Promotion at [address]       Phone: [phone]       Fax: [phone] [] [facility] 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 Notes weakness on L arm but is getting better. Patient 20 minutes late - writer able to accommodate     Objective:   Modalities:   Precautions: No neck ROM or strengthening per , Wear collar all times Surgery 17.   Exercises:  Exercise Reps/ Time Weight/ Level Comments   Posture 5 min   Reviewed in sitting with lumbar roll and why this aligned position is beneficial with retracted scapula and  head balanced    UBE- fwd/bkd 6 min  L 3 4 minutes fwd this date   Ball on wall 10 x  Green ball, flex/ext, horiz abd/add  Added 10/12   Wax on/wax off 1 min     Corner stretch- 3 levels   5 x ea  10-20 sec          Supine   Increased reps 10/17   Flex  15x  1 lb cane    ER/IR  15x  \"   Protraction/retraction  15x  \"     horiz abd/add  15x  \"     Chest press  15x  \"          Seated      Pulleys- 2 ways  1 min      Scapular retraction 10 x      Bicep curl and hammer curl 15x 2 lbs Added 10/17   L supination/pronation 15x 2 lbs Added 10/17                     Other: Manual- Bilateral upper trap/scapular massage with release. - Held 10/17. Patient late    Specific Instructions for next treatment:Emphasis- Left UE strengthening, myofacial work upper trap/scapula, posture alignment    Treatment Charges: Mins Units   []  Modalities     [x]  Ther Exercise 30 2   []  Manual Therapy     []  Ther Activities     []  Aquatics     []  Vasocompression     []  Other     Total Treatment time 30 2       Assessment: [x] Progressing toward goals. [] No change. [x] Other: Patient with good tolerance to exercises. Initial cueing required. Unable to perform reverse bicep curl properly; initiated pronation/supination with better return. Patient noted difficulty with the motion on L wrist, no issues on R wrist. No reports of increased pain or soreness. STG: (to be met in 10 treatments)  1. Pain: No increase in cervical pain with exercises  2. ? ROM: No loss of shoulder ROM  3. ? Strength: Test at 4+-5 in 3 motions without pain, Reports less heaviness in left arm  4. ? Function: able to reach over head with repetition, able to increase endurance exercises by 5 min (UBE)  5. Posture- improved alignment with upright posture  6. Spasms- min to none and able to manage if they recur  LTG: (to be met in 16 treatments)        1. Independent with Home Exercise Programs     Patient goals: To fully recover.       Pt. Education:  [x] Yes  [] No  [] Reviewed Prior HEP/Ed  Method of Education: [x] Verbal  [x] Demo  [x] Written  Comprehension of Education:  [x] Verbalizes understanding. [x] Demonstrates understanding. [] Needs review. [x] Demonstrates/verbalizes HEP/Ed previously given.   10/17/17: 3 way bicep curl and wrist supination/pronation     Plan: [x] Continue per plan of care.    [] Other:      Time In: 8:21 am           Time Out: 9:01 am    Electronically signed by:  Chase Johnson PTA

## 2017-10-19 ENCOUNTER — HOSPITAL ENCOUNTER (OUTPATIENT)
Dept: PHYSICAL THERAPY | Age: 47
Setting detail: THERAPIES SERIES
Discharge: HOME | End: 2017-10-19
Payer: COMMERCIAL

## 2017-10-19 PROCEDURE — 97110 THERAPEUTIC EXERCISES: CPT

## 2017-10-19 NOTE — FLOWSHEET NOTE
per plan of care.    [] Other:      Time In: 8:03 am           Time Out: 8:45 am    Electronically signed by:  Salomón Biggs PTA

## 2017-10-24 ENCOUNTER — HOSPITAL ENCOUNTER (OUTPATIENT)
Dept: PHYSICAL THERAPY | Age: 47
Setting detail: THERAPIES SERIES
Discharge: HOME | End: 2017-10-24
Payer: COMMERCIAL

## 2017-10-24 PROCEDURE — 97110 THERAPEUTIC EXERCISES: CPT

## 2017-10-24 NOTE — FLOWSHEET NOTE
[x] [facility]       Outpatient Physical        Therapy       [address]       Phone: [phone]       Fax: [phone] [] [facility] for Health Promotion at [address] [facility]       Phone: [phone]       Fax: [phone] [] 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 Objective:   Modalities:   Precautions: No neck ROM or strengthening per , Wear collar all times Surgery 17.   Exercises:  Exercise Reps/ Time Weight/ Level Comments   Posture  x   Reviewed in sitting with lumbar roll and why this aligned position is beneficial with retracted scapula and  head balanced    UBE- fwd/bkd 6 min  L 3 4 minutes fwd this date   Ball on wall 10x  Green ball, flex/ext, horiz abd/add,  circles   Wax on/wax off 1 min     Corner stretch- 3 levels   5 x ea  10-20 sec    Mr Hamida Spence- elbows straight and bent, both ways Full rep each way 1 lb added               Tband      Triceps 10x red    Fwd punch 10 x red added   Extension 10x red

## 2017-10-26 ENCOUNTER — HOSPITAL ENCOUNTER (OUTPATIENT)
Dept: PHYSICAL THERAPY | Age: 47
Setting detail: THERAPIES SERIES
Discharge: HOME | End: 2017-10-26
Payer: COMMERCIAL

## 2017-10-26 PROCEDURE — 97110 THERAPEUTIC EXERCISES: CPT

## 2017-10-26 PROCEDURE — 97530 THERAPEUTIC ACTIVITIES: CPT

## 2017-10-31 ENCOUNTER — HOSPITAL ENCOUNTER (OUTPATIENT)
Dept: PHYSICAL THERAPY | Age: 47
Setting detail: THERAPIES SERIES
Discharge: HOME | End: 2017-10-31
Payer: COMMERCIAL

## 2017-10-31 PROCEDURE — 97110 THERAPEUTIC EXERCISES: CPT

## 2017-11-02 ENCOUNTER — HOSPITAL ENCOUNTER (OUTPATIENT)
Dept: PHYSICAL THERAPY | Age: 47
Setting detail: THERAPIES SERIES
Discharge: HOME | End: 2017-11-02
Payer: COMMERCIAL

## 2017-11-02 PROCEDURE — 97110 THERAPEUTIC EXERCISES: CPT

## 2017-11-02 NOTE — FLOWSHEET NOTE
ways Full rep each way 1 lb                 Tband      Triceps 15 x blue  inc band to blue   Fwd punch 15 x blue  \"   Extension 15x Blue   \"    IR/ER  10x red    row 15 x blue inc band to blue   biceps 15 x blue  \"                    Supine      Pec stretch 3x20\"             Flex  15x  2 lb cane  inc weight   ER/IR  15x  \"     Protraction/retraction  15x  \"     horiz abd/add  15x  \"     Chest press  15x  \"          Seated      Pulleys- 2 ways 1 min   Emphasis in stretch   Scapular retraction 10x      Bicep curl and hammer curl 20X 1lbs    bilap supination/pronation 10 x 3 weights    Stabilization with ball 1 min      shoulder shrug/squeeze back/ relax  10 x      Other:      Specific Instructions for next treatment:Emphasis- Left UE strengthening, myofacial work upper trap/scapula, posture alignment- work with mirror    Treatment Charges: Mins Units   []  Modalities     [x]  Ther Exercise 53 4   []  Manual Therapy     []  Ther Activities       []  Aquatics     []  Vasocompression     []  Other     Total Treatment time 53  4       Assessment: [x] Progressing toward goals. Patient states he feels he's about 60% better. He feels he has improved with PT. Main problem is still some residual weakness left UE. No increased cervical pain. Left upper quarter pain 2/10, patient states more \"ache\" due to therex. Continue to observe precautions for no cervical ROM and strength and continuous use of C collar. Please update and advise on precautions. Recommend continue PT to progress toward stated goals. [] No change. [] Other:         STG: (to be met in 10 treatments)  1. Pain: No increase in cervical pain with exercises  2. ? ROM: No loss of shoulder ROM  3. ? Strength: Test at 4+-5 in 3 motions without pain, Reports less heaviness in left arm  4. ? Function: able to reach over head with repetition, able to increase endurance exercises by 5 min (UBE)  5.  Posture- improved alignment with upright posture  6. Spasms- min to none and able to manage if they recur  LTG: (to be met in 16 treatments)        1. Independent with Home Exercise Programs     Patient goals: To fully recover.       Pt. Education:  [x] Yes  [x] No  [] Reviewed Prior HEP/Ed  Method of Education: [x] Verbal  [] Demo  [] Written:  Posture with therex  Comprehension of Education:  [x] Verbalizes understanding. [x] Demonstrates understanding. [] Needs review. [] Demonstrates/verbalizes HEP/Ed previously given. 10/17/17: 3 way bicep curl and wrist supination/pronation     Plan: [x] Continue per plan of care.    [] Other:      Time In: 0800         Time Out:  2563 am    Electronically signed by:  Alicia Han, PT

## 2017-11-06 ENCOUNTER — HOSPITAL ENCOUNTER (OUTPATIENT)
Dept: GENERAL RADIOLOGY | Age: 47
Discharge: HOME | End: 2017-11-06
Payer: COMMERCIAL

## 2017-11-06 ENCOUNTER — HOSPITAL ENCOUNTER (OUTPATIENT)
Age: 47
Discharge: HOME | End: 2017-11-06
Payer: COMMERCIAL

## 2017-11-06 ENCOUNTER — OFFICE VISIT (OUTPATIENT)
Dept: NEUROSURGERY | Age: 47
End: 2017-11-06

## 2017-11-06 VITALS
SYSTOLIC BLOOD PRESSURE: 110 MMHG | HEART RATE: 93 BPM | WEIGHT: 182 LBS | BODY MASS INDEX: 26.11 KG/M2 | DIASTOLIC BLOOD PRESSURE: 70 MMHG

## 2017-11-06 DIAGNOSIS — S12.9XXD CLOSED FRACTURE OF CERVICAL VERTEBRA, UNSPECIFIED CERVICAL VERTEBRAL LEVEL, SUBSEQUENT ENCOUNTER: Primary | ICD-10-CM

## 2017-11-06 DIAGNOSIS — M50.10 HERNIATION OF CERVICAL INTERVERTEBRAL DISC WITH RADICULOPATHY: ICD-10-CM

## 2017-11-06 DIAGNOSIS — S12.591D OTHER CLOSED NONDISPLACED FRACTURE OF SIXTH CERVICAL VERTEBRA WITH ROUTINE HEALING, SUBSEQUENT ENCOUNTER: ICD-10-CM

## 2017-11-06 DIAGNOSIS — S13.101A TRAUMATIC DISC HERNIATION OF CERVICAL SPINE: ICD-10-CM

## 2017-11-06 PROCEDURE — 72040 X-RAY EXAM NECK SPINE 2-3 VW: CPT

## 2017-11-06 PROCEDURE — 99024 POSTOP FOLLOW-UP VISIT: CPT | Performed by: NEUROLOGICAL SURGERY

## 2017-11-06 RX ORDER — ACETAMINOPHEN 500 MG
500 TABLET ORAL EVERY 6 HOURS PRN
COMMUNITY
End: 2021-10-18 | Stop reason: ALTCHOICE

## 2017-11-06 NOTE — LETTER
[address], [facility] [address]  MOB # [address] [address]  Phone: [phone]  Fax: [phone]    Everett Pedro DO        November 6, 2017     Patient: Vannessa Lennon   YOB: 1970   Date of Visit: 11/6/2017       To Whom It May Concern: It is my medical opinion that Vannessa Lennon may return to work estimated December 6, 2017. If you have any questions or concerns, please don't hesitate to call.     Sincerely,        Everett Pedro DO

## 2017-11-06 NOTE — PROGRESS NOTES
Seen and examined in room. Feeling much better with only slight left  weakness. Vss; awake; alert; ox3; hitesh; eomi; face symm  5/5 RUE and LE. 4+ left  and WE and all else 5/5  cdi wound    I d/w pt that he should continue PT considering it is helping and neurological recovery can take 6mo or more. Ok to drive. No need for collar. 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 Advised that he really should take another mo off work. Will call into office at [facility] and we can provide work release at that time -- may need to be initial part time. No return at this time however.

## 2017-11-07 ENCOUNTER — HOSPITAL ENCOUNTER (OUTPATIENT)
Dept: PHYSICAL THERAPY | Age: 47
Setting detail: THERAPIES SERIES
Discharge: HOME | End: 2017-11-07
Payer: COMMERCIAL

## 2017-11-07 PROCEDURE — 97110 THERAPEUTIC EXERCISES: CPT

## 2017-11-07 NOTE — PROGRESS NOTES
x blue    Extension 15x Blue      IR/ER  10x blue    row 15 x blue    biceps 15 x blue     shoulder shrugs  15 x   blue           Supine      Pec stretch     HEP           Flex    2 lb cane      ER/IR   \"     Protraction/retraction   \"     horiz abd/add   \"     Chest press   \"          Seated      Pulleys- 2 ways     Emphasis in stretch   Scapular retraction 10x      Bicep curl and hammer curl   1lbs    bilap supination/pronation 10 x 3 weights    Stabilization with ball        shoulder shrug/squeeze back/ relax  10 x      Other:  Re-assess - strength left shoulder 4+-5 except ER 4+, tricep  4+,  left 34, 32, 42,   Rt 65, 62, 65  ROM- WFL arms    Discussed job simulation to be ready for work      Specific Instructions for next treatment:Emphasis- Left UE strengthening, myofacial work upper trap/scapula, posture alignment- work with mirror    Treatment Charges: Mins Units   []  Modalities     [x]  Ther Exercise 53 4   []  Manual Therapy     []  Ther Activities       []  Aquatics     []  Vasocompression     []  Other     Total Treatment time 53  4       Assessment: [x] Progressing toward goals. [] No change. [] Other:         STG: (to be met in 10 treatments)  1. Pain: No increase in cervical pain with exercises- Partially Met  2. ? ROM: No loss of shoulder ROM- MET  3. ? Strength: Test at 4+-5 in 3 motions without pain, Reports less heaviness in left arm- MET  4. ? Function: able to reach over head with repetition-Progress, able to increase endurance exercises by 5 min (UBE)- MET  5. Posture- improved alignment with upright posture- MET  6. Spasms- min to none and able to manage if they recur- Progress  LTG: (to be met in 16 treatments)        1. Independent with Home Exercise Programs     Patient goals: To fully recover.       Pt.  Education:  [x] Yes  [x] No  [] Reviewed Prior HEP/Ed  Method of Education: [x] Verbal  [] Demo  [] Written:  Posture with therex  Comprehension of Education:  [x]

## 2017-11-09 ENCOUNTER — HOSPITAL ENCOUNTER (OUTPATIENT)
Dept: PHYSICAL THERAPY | Age: 47
Setting detail: THERAPIES SERIES
Discharge: HOME | End: 2017-11-09
Payer: COMMERCIAL

## 2017-11-09 PROCEDURE — 97110 THERAPEUTIC EXERCISES: CPT

## 2017-11-09 PROCEDURE — 97530 THERAPEUTIC ACTIVITIES: CPT

## 2017-11-09 NOTE — FLOWSHEET NOTE
[x] [facility]       Outpatient Physical        Therapy       [address]       Phone: [phone]       Fax: [phone] [] [facility] Health Promotion at [address] [facility]       Phone: [phone]       Fax: [phone] [] [facility]. 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     Objective:   Modalities:    Precautions:    Exercises:  Exercise Reps/ Time Weight/ Level Comments   Posture  x   Reviewed in sitting with lumbar roll and why this aligned position is beneficial with retracted scapula and  head balanced    UBE- fwd/bkd 7 min  L4      Ball on wall 4 ways 10x  Green ball, up/downt, side/side,  Circles 2 ways   Wax on/wax off 1 min     Corner stretch- 3 levels   5 x ea  10-20 sec    Mr Liam Leung- elbows straight and bent, both ways Full rep each way 1 lb     Neck AROM- gentle 10 x   reviewed- with mirror   Isometric neck - sub max, 6 ways 10 x ea   reviewed- with mirror         Work sim- lift crate from Exercise Programs     Patient goals: To fully recover.       Pt. Education:  [] Yes  [x] No  [] Reviewed Prior HEP/Ed  Method of Education: [] Verbal  [] Demo  [] Written:  Posture with therex  Comprehension of Education:  [] Verbalizes understanding. [] Demonstrates understanding. [] Needs review. [] Demonstrates/verbalizes HEP/Ed previously given. 10/17/17: 3 way bicep curl and wrist supination/pronation  11-7-17 - Neck ROM and isometric     Plan: [x] Continue per plan of care.    [] Other:      Time In: 0800         Time Out:   6211    Electronically signed by:  Faby Frank, PT

## 2017-11-14 ENCOUNTER — HOSPITAL ENCOUNTER (OUTPATIENT)
Dept: PHYSICAL THERAPY | Age: 47
Setting detail: THERAPIES SERIES
Discharge: HOME | End: 2017-11-14
Payer: COMMERCIAL

## 2017-11-14 PROCEDURE — 97110 THERAPEUTIC EXERCISES: CPT

## 2017-11-14 NOTE — FLOWSHEET NOTE
[x] Job Rupa       Outpatient Physical        Therapy       [address]       Phone: [phone]       Fax: [phone] [] [facility]otion at [address] [facility]       Phone: [phone]       Fax: [phone] [] [facility] for Health Promotion  [address] [place] [address]   Phone: [phone]   Fax:  [phone]     Physical Therapy Daily Treatment Note    Date:  2017  Patient Name:  Priyank Pimentel    :  1970  MRN: [id number]  Physician: 17[facility]                    Insurance: [id number] approved 2-3x for 6-8 weeks from 10-2 to 17  Medical Diagnosis: closed nondisplaced fx C6 S12.591, 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, herniation of disc w/slwcelrfhzxnzU99.10                                 Rehab Codes: pain M54.2, myofascial M79.1,M62.838, weakness M62.592  Onset Date: 17                                    Next 's appt.:   Visit# / total visits:   Cancels/No Shows:  0/0    Subjective:    Pain:  [x] Yes  [] No Location:   posterior neck, upper back Pain Rating: (0-10 scale) 2/10  Pain altered Tx:  [x] No  [] Yes  Action:  Comments:   Good weekend    Objective:   Modalities:    Precautions:    Exercises:  Exercise Reps/ Time Weight/ Level Comments   Posture  x   Reviewed in sitting with lumbar roll and why this aligned position is beneficial with retracted scapula and  head balanced    UBE- fwd/bkd 7 min  L5   Inc level   Ball on wall 4 ways 10x  Green ball, up/downt, side/side,  Circles 2 ways   Wax on/wax off 1 min     Corner stretch- 3 levels   5 x ea  10-20 sec    Mr Danica Tidwell- elbows straight and bent, both ways Full rep each way 1 lb     Neck AROM- gentle 10 x   reviewed- with mirror   Isometric neck - sub max, 6 ways 10 x ea   reviewed- with mirror   Figure 8      Work sim- lift crate from floor to window sill  10 x 2  10 lb added      Tband Triceps 15 x green     Fwd punch 15 x green    Extension 15x   green     IR/ER - bilat 15x  green    row 15 x  green    biceps 15 x green     shoulder shrugs  15 x   green  verbal cues         Supine      Pec stretch  1 min               Flex    2 lb cane   HEP   ER/IR   \"     Protraction/retraction   \"     horiz abd/add   \"     Chest press   \"          Seated      Pulleys- 2 ways         Scapular retraction 10x      Bicep curl and hammer curl  10 x 1lbs    bilap supination/pronation 10 x 3 weights    Stabilization with ball  1 min      shoulder shrug/squeeze back/ relax  10 x      Other:     Discussed job simulation to be ready for work- lifting from floor to chest high is typical- added ex as above for job simulation    Manual - very tight upper traps- trigger point releases bilat UT's    Specific Instructions for next treatment:Emphasis- Left UE strengthening, myofacial work upper trap/scapula, posture alignment- work with mirror    Treatment Charges: Mins Units   []  Modalities     [x]  Ther Exercise  50 3   []  Manual Therapy       []  Ther Activities        []  Aquatics     []  Vasocompression     []  Other     Total Treatment time 50         Assessment: [x] Progressing toward goals. [] No change. [] Other:         STG: (to be met in 10 treatments)  1. Pain: No increase in cervical pain with exercises- Partially Met  2. ? ROM: No loss of shoulder ROM- MET  3. ? Strength: Test at 4+-5 in 3 motions without pain, Reports less heaviness in left arm- MET  4. ? Function: able to reach over head with repetition-Progress, able to increase endurance exercises by 5 min (UBE)- MET  5. Posture- improved alignment with upright posture- MET  6. Spasms- min to none and able to manage if they recur- Progress  LTG: (to be met in 16 treatments)        1. Independent with Home Exercise Programs     Patient goals: To fully recover.       Pt.  Education:  [] Yes  [x] No  [] Reviewed Prior HEP/Ed  Method of

## 2017-11-16 ENCOUNTER — HOSPITAL ENCOUNTER (OUTPATIENT)
Dept: PHYSICAL THERAPY | Age: 47
Setting detail: THERAPIES SERIES
Discharge: HOME | End: 2017-11-16
Payer: COMMERCIAL

## 2017-11-16 PROCEDURE — 97110 THERAPEUTIC EXERCISES: CPT

## 2017-11-16 NOTE — FLOWSHEET NOTE
[x] Marylin Malhotra       Outpatient Physical        Therapy       [address]       Phone: [phone]       Fax: [phone] [] [facility] at [address] [facility]       Phone: [phone]       Fax: [phone] [] [facility]. 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 :?   Visit# / total visits:   Cancels/No Shows:  0/0    Subjective:    Pain:  [x] Yes  [] No Location:   posterior neck, upper back Pain Rating: (0-10 scale) 2/10  Pain altered Tx:  [x] No  [] Yes  Action:  Comments:   Had a spasm when leaning over doing dishes in mid back.     Objective:   Modalities:    Precautions:    Exercises:  Exercise Reps/ Time Weight/ Level Comments   Posture  x   Reviewed in sitting with lumbar roll and why this aligned position is beneficial with retracted scapula and  head balanced    UBE- fwd/bkd 7 min  L5       Ball on wall 4 ways 15x  Green ball, up/downt, side/side,  Circles 2 ways   Wax on/wax off 1 min     Corner stretch- 3 levels   5 x ea  10-20 sec    Wall push up 10 x  added   Mr Varun Lewis- elbows straight and bent, both ways 2 reps each way 1 lb     Neck AROM- gentle 10 x      Isometric neck - sub max, 6 ways 10 x ea     Figure 8 10 x   added- verbal and tactile cues needed   Work sim- lift crate from floor to window sill  10 x 3  10 lb        Tband      Triceps 15 x green     Fwd punch 15 x green    Extension 15x   green     IR/ER - bilat 15x  green    row 15 x  green    Biceps (stepping on band) 15 x green     shoulder shrugs (stepping on band)  15 x   green  verbal cues   Elevation POS  10 x  added   Bilat lifting to chin 10 x  added   Supine      Pec stretch  1 min               Flex    2 lb cane   HEP   ER/IR   \"     Protraction/retraction   \"     horiz abd/add   \"     Chest press   \"          Seated      Pulleys- 2 ways         Scapular retraction 10x      Bicep curl and hammer curl    1lbs    bilap supination/pronation 10 x 2 3 weights    Stabilization with ball  1 min      shoulder shrug/squeeze back/ relax  10 x      Other:     Discussed job simulation to be ready for work- lifting from floor to chest high is typical- added ex as above for job simulation    Manual - very tight upper traps- trigger point releases bilat UT's    Specific Instructions for next treatment:Emphasis- Left UE strengthening, myofacial work upper trap/scapula, posture alignment- work with mirror    Treatment Charges: Mins Units   []  Modalities     [x]  Ther Exercise  50 3   []  Manual Therapy       []  Ther Activities        []  Aquatics     []  Vasocompression     []  Other     Total Treatment time 50         Assessment: [x] Progressing toward goals. [] No change. [] Other:         STG: (to be met in 10 treatments)  1. Pain: No increase in cervical pain with exercises- Partially Met  2. ? ROM: No loss of shoulder ROM- MET  3. ? Strength: Test at 4+-5 in 3 motions without pain, Reports less heaviness in left arm- MET  4. ? Function: able to reach over head with repetition-Progress, able to increase endurance exercises by 5 min (UBE)- MET  5. Posture- improved alignment with upright posture- MET  6. Spasms- min to none and able to manage if they recur- Progress  LTG: (to be met in 16 treatments)        1.   Independent

## 2017-11-21 ENCOUNTER — HOSPITAL ENCOUNTER (OUTPATIENT)
Dept: PHYSICAL THERAPY | Age: 47
Setting detail: THERAPIES SERIES
Discharge: HOME | End: 2017-11-21
Payer: COMMERCIAL

## 2017-11-21 PROCEDURE — 97110 THERAPEUTIC EXERCISES: CPT

## 2017-11-27 ENCOUNTER — OFFICE VISIT (OUTPATIENT)
Dept: FAMILY MEDICINE CLINIC | Age: 47
End: 2017-11-27
Payer: MEDICAID

## 2017-11-27 ENCOUNTER — HOSPITAL ENCOUNTER (OUTPATIENT)
Age: 47
Setting detail: SPECIMEN
Discharge: HOME | End: 2017-11-27

## 2017-11-27 VITALS
WEIGHT: 179 LBS | HEIGHT: 70 IN | HEART RATE: 91 BPM | SYSTOLIC BLOOD PRESSURE: 107 MMHG | DIASTOLIC BLOOD PRESSURE: 75 MMHG | TEMPERATURE: 97.4 F | BODY MASS INDEX: 25.62 KG/M2

## 2017-11-27 DIAGNOSIS — Z13.1 ENCOUNTER FOR SCREENING EXAMINATION FOR IMPAIRED GLUCOSE REGULATION AND DIABETES MELLITUS: ICD-10-CM

## 2017-11-27 DIAGNOSIS — Z23 NEED FOR PROPHYLACTIC VACCINATION OR INOCULATION AGAINST DIPHTHERIA AND TETANUS: ICD-10-CM

## 2017-11-27 DIAGNOSIS — Z11.4 SCREENING FOR HIV (HUMAN IMMUNODEFICIENCY VIRUS): ICD-10-CM

## 2017-11-27 DIAGNOSIS — Z13.1 ENCOUNTER FOR SCREENING EXAMINATION FOR IMPAIRED GLUCOSE REGULATION AND DIABETES MELLITUS: Primary | ICD-10-CM

## 2017-11-27 DIAGNOSIS — Z00.00 HEALTHCARE MAINTENANCE: ICD-10-CM

## 2017-11-27 DIAGNOSIS — Z23 NEEDS FLU SHOT: ICD-10-CM

## 2017-11-27 DIAGNOSIS — F17.200 TOBACCO DEPENDENCE: ICD-10-CM

## 2017-11-27 LAB
ANION GAP SERPL CALCULATED.3IONS-SCNC: 11 MMOL/L (ref 9–17)
BUN BLDV-MCNC: 12 MG/DL (ref 6–20)
BUN/CREAT BLD: NORMAL (ref 9–20)
CALCIUM SERPL-MCNC: 9.4 MG/DL (ref 8.6–10.4)
CHLORIDE BLD-SCNC: 103 MMOL/L (ref 98–107)
CHOLESTEROL/HDL RATIO: 6
CHOLESTEROL: 258 MG/DL
CO2: 26 MMOL/L (ref 20–31)
CREAT SERPL-MCNC: 0.89 MG/DL (ref 0.7–1.2)
ESTIMATED AVERAGE GLUCOSE: 103 MG/DL
GFR AFRICAN AMERICAN: >60 ML/MIN
GFR NON-AFRICAN AMERICAN: >60 ML/MIN
GFR SERPL CREATININE-BSD FRML MDRD: NORMAL ML/MIN/{1.73_M2}
GFR SERPL CREATININE-BSD FRML MDRD: NORMAL ML/MIN/{1.73_M2}
GLUCOSE BLD-MCNC: 95 MG/DL (ref 70–99)
HBA1C MFR BLD: 5.2 % (ref 4–6)
HDLC SERPL-MCNC: 43 MG/DL
HIV AG/AB: NONREACTIVE
LDL CHOLESTEROL: 184 MG/DL (ref 0–130)
POTASSIUM SERPL-SCNC: 4.5 MMOL/L (ref 3.7–5.3)
SODIUM BLD-SCNC: 140 MMOL/L (ref 135–144)
TRIGL SERPL-MCNC: 154 MG/DL
VLDLC SERPL CALC-MCNC: ABNORMAL MG/DL (ref 1–30)

## 2017-11-27 PROCEDURE — 90688 IIV4 VACCINE SPLT 0.5 ML IM: CPT | Performed by: FAMILY MEDICINE

## 2017-11-27 PROCEDURE — 90472 IMMUNIZATION ADMIN EACH ADD: CPT | Performed by: FAMILY MEDICINE

## 2017-11-27 PROCEDURE — 90715 TDAP VACCINE 7 YRS/> IM: CPT | Performed by: FAMILY MEDICINE

## 2017-11-27 PROCEDURE — 90471 IMMUNIZATION ADMIN: CPT | Performed by: FAMILY MEDICINE

## 2017-11-27 PROCEDURE — 99396 PREV VISIT EST AGE 40-64: CPT | Performed by: FAMILY MEDICINE

## 2017-11-27 RX ORDER — VARENICLINE TARTRATE 1 MG/1
1 TABLET, FILM COATED ORAL 2 TIMES DAILY
Qty: 60 TABLET | Refills: 3 | Status: SHIPPED | OUTPATIENT
Start: 2017-11-27 | End: 2018-12-19 | Stop reason: SDUPTHER

## 2017-11-27 RX ORDER — VARENICLINE TARTRATE 25 MG
KIT ORAL
Qty: 1 EACH | Refills: 0 | Status: SHIPPED | OUTPATIENT
Start: 2017-11-27 | End: 2018-01-19

## 2017-11-27 ASSESSMENT — ENCOUNTER SYMPTOMS
COUGH: 0
SORE THROAT: 0
SHORTNESS OF BREATH: 0
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
## 2017-11-28 ENCOUNTER — HOSPITAL ENCOUNTER (OUTPATIENT)
Dept: PHYSICAL THERAPY | Age: 47
Setting detail: THERAPIES SERIES
Discharge: HOME | End: 2017-11-28
Payer: COMMERCIAL

## 2017-11-28 PROCEDURE — 97110 THERAPEUTIC EXERCISES: CPT

## 2017-11-28 NOTE — FLOWSHEET NOTE
[x] Twila Hawley       Outpatient Physical        Therapy       [address]       Phone: [phone]       Fax: [phone] [] [facility] Promotion at [address]       Phone: [phone]       Fax: [phone] [] [facility]. 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 :?   Visit# / total visits: 15/16  Cancels/No Shows:  0/0    Subjective:    Pain:  [x] Yes  [] No Location:   posterior neck, upper back Pain Rating: (0-10 scale) 1/10  Pain altered Tx:  [x] No  [] Yes  Action:  Comments: Moved some furniture at home and feels ok     Objective:   Modalities:    Precautions:    Exercises:  Exercise Reps/ Time Weight/ Level Comments   Posture  x   Reviewed in sitting with lumbar roll and why this aligned position is beneficial with retracted scapula and  head balanced    UBE- fwd/bkd 8 min  L5       Ball on wall 4 ways 15x  Green ball, up/downt, side/side,  Circles 2 ways   Wax on/wax off 1 min     Corner stretch- 3 levels   5 x ea  10-20 sec    Wall push up 10 x 2      Mr Luis Lands- elbows straight and bent, both ways 2 reps each way 1 lb     Neck AROM- gentle 10 x      Isometric neck - sub max, 6 ways 10 x ea     Figure 8 10 x   added- verbal and tactile cues needed   Work sim- lift crate from floor to window

## 2017-11-30 ENCOUNTER — APPOINTMENT (OUTPATIENT)
Dept: PHYSICAL THERAPY | Age: 47
End: 2017-11-30
Payer: COMMERCIAL

## 2017-12-05 ENCOUNTER — TELEPHONE (OUTPATIENT)
Dept: FAMILY MEDICINE CLINIC | Age: 47
End: 2017-12-05

## 2017-12-05 ENCOUNTER — HOSPITAL ENCOUNTER (OUTPATIENT)
Dept: PHYSICAL THERAPY | Age: 47
Setting detail: THERAPIES SERIES
Discharge: HOME | End: 2017-12-05
Payer: COMMERCIAL

## 2017-12-05 ENCOUNTER — TELEPHONE (OUTPATIENT)
Dept: NEUROSURGERY | Age: 47
End: 2017-12-05

## 2017-12-05 PROCEDURE — 97110 THERAPEUTIC EXERCISES: CPT

## 2017-12-05 NOTE — DISCHARGE SUMMARY
Tband      Triceps 15 x green     Fwd punch 15 x green    Extension 15x   green     IR/ER - bilat 15x  green    row 15 x  green    Biceps (stepping on band) 15 x green     shoulder shrugs (stepping on band)  15 x   green  verbal cues   Elevation POS  10 x green added   Bilat lifting to chin 10 x green added   Supine      Pec stretch  1 min               Flex    2 lb cane   HEP   ER/IR   \"     Protraction/retraction   \"     horiz abd/add   \"     Chest press   \"          Seated      Pulleys- 2 ways         Scapular retraction 10x      Bicep curl and hammer curl    1lbs    bilap supination/pronation 10 x 2 3 weights    Stabilization with ball  1 min      shoulder shrug/squeeze back/ relax  10 x        Re-assess - strength left shoulder 4+-5 except ER 4+-5, tricep  4+-5,  left 35,60,42,    Rt 65, 62, 65  ROM- WFL arms     Previously issued and reviewed Home Exercise program and how to progress some of the exercises over time   Issued black band to progress to independently     Neck Disability score 10 = 20% disability - improved 14%         Treatment Charges: Mins Units   []  Modalities     [x]  Ther Exercise  42 3   []  Manual Therapy       []  Ther Activities        []  Aquatics     []  Vasocompression     []  Other     Total Treatment time 42         Assessment: [x] Progressing toward goals. Goals Met as below     [] No change. [] Other:         STG: (to be met in 10 treatments)  1. Pain: No increase in cervical pain with exercises- MET  2. ? ROM: No loss of shoulder ROM- MET  3. ? Strength: Test at 4+-5 in 3 motions without pain- MET, Reports less heaviness in left arm- MET  4. ? Function: able to reach over head with repetition-MET, able to increase endurance exercises by 5 min (UBE)- MET  5. Posture- improved alignment with upright posture- MET  6. Spasms- min to none and able to manage if they recur- MET  LTG: (to be met in 16 treatments)        1.   Independent with Home Exercise Programs-MET     Patient goals: To fully recover.       Pt. Education:  [x] Yes  [] No  [] Reviewed Prior HEP/Ed  Method of Education: [x] Verbal  [x] Demo  [x] Written:     Comprehension of Education:  [x] Verbalizes understanding. [x] Demonstrates understanding. [] Needs review. [x] Demonstrates/verbalizes HEP/Ed previously given. 10/17/17: 3 way bicep curl and wrist supination/pronation  11-7-17 - Neck ROM and isometric     Treatment to Date:  [x] Therapeutic Exercise    [x] Modalities:  [x] Therapeutic Activity    [] Ultrasound  [] Electrical Stimulation  [] Gait Training     [x] Massage       [] Lumbar/Cervical Traction  [] Neuromuscular Re-education [x] Cold/hotpack [] Iontophoresis: 4 mg/mL  [x] Instruction in Home Exercise Program                     Dexamethasone Sodium  [x] Manual Therapy             Phosphate 40-80 mAmin  [] Aquatic Therapy                   [] Vasocompression/    [] Other:             Game Ready    Discharge Status:     [x] Pt recovered from conditions. Treatment goals were met. [x] Pt received maximum benefit. No further therapy indicated at this time. [] Pt to continue exercise/home instructions independently. [] Therapy interrupted due to:    [] Pt has 2 or more no shows/cancels, is discontinued per our policy. [x] Pt has completed prescribed number of treatment sessions.     [] Other:         Time In: 0802      Time Out:   3944    Electronically signed by:  Raz Corbin PT
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
## 2018-01-16 ENCOUNTER — TELEPHONE (OUTPATIENT)
Dept: NEUROSURGERY | Age: 48
End: 2018-01-16

## 2018-01-16 NOTE — TELEPHONE ENCOUNTER
Ok to provide time off through 1st wk of February. Beyond that he will need to contact his PCP as I will not provide any further time off.

## 2018-01-19 ENCOUNTER — OFFICE VISIT (OUTPATIENT)
Dept: FAMILY MEDICINE CLINIC | Age: 48
End: 2018-01-19
Payer: MEDICARE

## 2018-01-19 VITALS
DIASTOLIC BLOOD PRESSURE: 88 MMHG | HEART RATE: 88 BPM | HEIGHT: 70 IN | BODY MASS INDEX: 26.83 KG/M2 | TEMPERATURE: 98.1 F | WEIGHT: 187.4 LBS | SYSTOLIC BLOOD PRESSURE: 98 MMHG

## 2018-01-19 DIAGNOSIS — S13.9XXS: Primary | ICD-10-CM

## 2018-01-19 PROCEDURE — 1036F TOBACCO NON-USER: CPT | Performed by: FAMILY MEDICINE

## 2018-01-19 PROCEDURE — G8427 DOCREV CUR MEDS BY ELIG CLIN: HCPCS | Performed by: FAMILY MEDICINE

## 2018-01-19 PROCEDURE — G8419 CALC BMI OUT NRM PARAM NOF/U: HCPCS | Performed by: FAMILY MEDICINE

## 2018-01-19 PROCEDURE — G8484 FLU IMMUNIZE NO ADMIN: HCPCS | Performed by: FAMILY MEDICINE

## 2018-01-19 PROCEDURE — 99213 OFFICE O/P EST LOW 20 MIN: CPT | Performed by: FAMILY MEDICINE

## 2018-01-19 NOTE — PATIENT INSTRUCTIONS
Thank you for letting us take care of you today. We hope all your questions were addressed. If a question was overlooked or something else comes to mind after you return home, please contact a member of your Care Team listed below. Please make sure you have a routine office visit set up to follow-up on [address]. Your Care Team at [place] [address] is Team #2  Isaiah Tatum DO (Faculty)  Jose Daniel Barraza MD (Resident)  Merlin Bolanos MD (Resident)  Flory Rizzo MD (Resident)  Regla Carvajal MD (Resident)  Buddy Schaffer MD (Resident)  Abilio Walls, LPMARY CARMEN  [facility] Serum., RMA  Martell Hutton, RMA  Maria L Asp ([address])  Gertrudis Aburto RN, ([address])  Stephanie Carver, Ph.D., (Behavioral Services)  Fatmata Rogers, [address] [place] [address] (Clinical Pharmacist)     Office phone number: [phone]    If you need to get in right away due to illness, please be advised we have \"Same Day\" appointments available Monday-Friday. Please call us at [phone] option #1 to schedule your \"Same Day\" appointment.

## 2018-12-19 ENCOUNTER — OFFICE VISIT (OUTPATIENT)
Dept: FAMILY MEDICINE CLINIC | Age: 48
End: 2018-12-19
Payer: MEDICARE

## 2018-12-19 VITALS
HEIGHT: 70 IN | WEIGHT: 193.2 LBS | DIASTOLIC BLOOD PRESSURE: 66 MMHG | SYSTOLIC BLOOD PRESSURE: 125 MMHG | TEMPERATURE: 98.7 F | HEART RATE: 91 BPM | BODY MASS INDEX: 27.66 KG/M2

## 2018-12-19 DIAGNOSIS — F17.200 TOBACCO DEPENDENCE: ICD-10-CM

## 2018-12-19 DIAGNOSIS — Z12.11 ENCOUNTER FOR SCREENING COLONOSCOPY: Primary | ICD-10-CM

## 2018-12-19 PROCEDURE — 1036F TOBACCO NON-USER: CPT | Performed by: FAMILY MEDICINE

## 2018-12-19 PROCEDURE — G8484 FLU IMMUNIZE NO ADMIN: HCPCS | Performed by: FAMILY MEDICINE

## 2018-12-19 PROCEDURE — G8419 CALC BMI OUT NRM PARAM NOF/U: HCPCS | Performed by: FAMILY MEDICINE

## 2018-12-19 PROCEDURE — 3017F COLORECTAL CA SCREEN DOC REV: CPT | Performed by: FAMILY MEDICINE

## 2018-12-19 PROCEDURE — G8427 DOCREV CUR MEDS BY ELIG CLIN: HCPCS | Performed by: FAMILY MEDICINE

## 2018-12-19 PROCEDURE — 99211 OFF/OP EST MAY X REQ PHY/QHP: CPT | Performed by: FAMILY MEDICINE

## 2018-12-19 PROCEDURE — 99213 OFFICE O/P EST LOW 20 MIN: CPT | Performed by: FAMILY MEDICINE

## 2018-12-19 RX ORDER — VARENICLINE TARTRATE 1 MG/1
1 TABLET, FILM COATED ORAL 2 TIMES DAILY
Qty: 60 TABLET | Refills: 3 | Status: SHIPPED | OUTPATIENT
Start: 2018-12-19 | End: 2019-09-13 | Stop reason: SDUPTHER

## 2018-12-19 ASSESSMENT — ENCOUNTER SYMPTOMS
ABDOMINAL PAIN: 0
CHEST TIGHTNESS: 0
CHOKING: 0
SHORTNESS OF BREATH: 0
BACK PAIN: 0

## 2018-12-19 ASSESSMENT — PATIENT HEALTH QUESTIONNAIRE - PHQ9
SUM OF ALL RESPONSES TO PHQ9 QUESTIONS 1 & 2: 0
SUM OF ALL RESPONSES TO PHQ QUESTIONS 1-9: 0
1. LITTLE INTEREST OR PLEASURE IN DOING THINGS: 0
SUM OF ALL RESPONSES TO PHQ QUESTIONS 1-9: 0
2. FEELING DOWN, DEPRESSED OR HOPELESS: 0

## 2018-12-19 NOTE — PROGRESS NOTES
Visit Information    Have you changed or started any medications since your last visit including any over-the-counter medicines, vitamins, or herbal medicines? no   Have you stopped taking any of your medications? Is so, why? -  no  Are you having any side effects from any of your medications? - no    Have you seen any other physician or provider since your last visit?  no   Have you had any other diagnostic tests since your last visit?  no   Have you been seen in the emergency room and/or had an admission in a hospital since we last saw you?  no   Have you had your routine dental cleaning in the past 6 months?  no     Do you have an active MyChart account? If no, what is the barrier?   No: inactive    Patient Care Team:  Abdulkadir Gloria DO as PCP - General (Family Medicine)  Abdulkadir Gloria DO as PCP - [facility] Attributed Provider    Medical History Review  Past Medical, Family, and Social History reviewed and does not contribute to the patient presenting condition    Health Maintenance   Topic Date Due    Shingles Vaccine (1 of 2 - 2 Dose Series) 05/23/2018    Colon cancer screen colonoscopy  05/23/2018    Flu vaccine (1) 09/01/2018    Lipid screen  11/27/2022    DTaP/Tdap/Td vaccine (2 - Td) 11/27/2027    HIV screen  Completed

## 2018-12-20 ENCOUNTER — TELEPHONE (OUTPATIENT)
Dept: FAMILY MEDICINE CLINIC | Age: 48
End: 2018-12-20

## 2019-01-02 ENCOUNTER — OFFICE VISIT (OUTPATIENT)
Dept: SURGERY | Age: 49
End: 2019-01-02
Payer: MEDICARE

## 2019-01-02 VITALS
HEART RATE: 83 BPM | SYSTOLIC BLOOD PRESSURE: 112 MMHG | BODY MASS INDEX: 27.46 KG/M2 | TEMPERATURE: 98.8 F | WEIGHT: 191.8 LBS | DIASTOLIC BLOOD PRESSURE: 77 MMHG | HEIGHT: 70 IN

## 2019-01-02 DIAGNOSIS — Z12.11 ENCOUNTER FOR SCREENING COLONOSCOPY: Primary | ICD-10-CM

## 2019-01-02 PROCEDURE — 99202 OFFICE O/P NEW SF 15 MIN: CPT | Performed by: STUDENT IN AN ORGANIZED HEALTH CARE EDUCATION/TRAINING PROGRAM

## 2019-01-02 PROCEDURE — G8427 DOCREV CUR MEDS BY ELIG CLIN: HCPCS | Performed by: STUDENT IN AN ORGANIZED HEALTH CARE EDUCATION/TRAINING PROGRAM

## 2019-01-02 PROCEDURE — 4004F PT TOBACCO SCREEN RCVD TLK: CPT | Performed by: STUDENT IN AN ORGANIZED HEALTH CARE EDUCATION/TRAINING PROGRAM

## 2019-01-02 PROCEDURE — 3017F COLORECTAL CA SCREEN DOC REV: CPT | Performed by: STUDENT IN AN ORGANIZED HEALTH CARE EDUCATION/TRAINING PROGRAM

## 2019-01-02 PROCEDURE — G8419 CALC BMI OUT NRM PARAM NOF/U: HCPCS | Performed by: STUDENT IN AN ORGANIZED HEALTH CARE EDUCATION/TRAINING PROGRAM

## 2019-01-02 PROCEDURE — G8484 FLU IMMUNIZE NO ADMIN: HCPCS | Performed by: STUDENT IN AN ORGANIZED HEALTH CARE EDUCATION/TRAINING PROGRAM

## 2019-01-02 RX ORDER — POLYETHYLENE GLYCOL 3350 17 G/17G
POWDER ORAL
Qty: 1 BOTTLE | Refills: 0 | Status: SHIPPED | OUTPATIENT
Start: 2019-01-02 | End: 2021-02-24 | Stop reason: ALTCHOICE

## 2019-03-07 ENCOUNTER — OFFICE VISIT (OUTPATIENT)
Dept: FAMILY MEDICINE CLINIC | Age: 49
End: 2019-03-07
Payer: MEDICARE

## 2019-03-07 VITALS
TEMPERATURE: 98.7 F | HEART RATE: 70 BPM | WEIGHT: 196 LBS | SYSTOLIC BLOOD PRESSURE: 110 MMHG | BODY MASS INDEX: 28.12 KG/M2 | DIASTOLIC BLOOD PRESSURE: 72 MMHG

## 2019-03-07 DIAGNOSIS — R07.89 RIGHT-SIDED CHEST WALL PAIN: Primary | ICD-10-CM

## 2019-03-07 PROCEDURE — G8427 DOCREV CUR MEDS BY ELIG CLIN: HCPCS | Performed by: FAMILY MEDICINE

## 2019-03-07 PROCEDURE — G8484 FLU IMMUNIZE NO ADMIN: HCPCS | Performed by: FAMILY MEDICINE

## 2019-03-07 PROCEDURE — 99211 OFF/OP EST MAY X REQ PHY/QHP: CPT | Performed by: FAMILY MEDICINE

## 2019-03-07 PROCEDURE — G8419 CALC BMI OUT NRM PARAM NOF/U: HCPCS | Performed by: FAMILY MEDICINE

## 2019-03-07 PROCEDURE — 3017F COLORECTAL CA SCREEN DOC REV: CPT | Performed by: FAMILY MEDICINE

## 2019-03-07 PROCEDURE — 99213 OFFICE O/P EST LOW 20 MIN: CPT | Performed by: FAMILY MEDICINE

## 2019-03-07 PROCEDURE — 4004F PT TOBACCO SCREEN RCVD TLK: CPT | Performed by: FAMILY MEDICINE

## 2019-03-07 ASSESSMENT — PATIENT HEALTH QUESTIONNAIRE - PHQ9
2. FEELING DOWN, DEPRESSED OR HOPELESS: 0
SUM OF ALL RESPONSES TO PHQ9 QUESTIONS 1 & 2: 1
SUM OF ALL RESPONSES TO PHQ QUESTIONS 1-9: 1
1. LITTLE INTEREST OR PLEASURE IN DOING THINGS: 1
SUM OF ALL RESPONSES TO PHQ QUESTIONS 1-9: 1

## 2019-03-07 ASSESSMENT — ENCOUNTER SYMPTOMS
STRIDOR: 0
CHEST TIGHTNESS: 0
SHORTNESS OF BREATH: 0
WHEEZING: 0

## 2019-09-04 ENCOUNTER — NURSE TRIAGE (OUTPATIENT)
Dept: OTHER | Facility: CLINIC | Age: 49
End: 2019-09-04

## 2019-09-13 ENCOUNTER — OFFICE VISIT (OUTPATIENT)
Dept: FAMILY MEDICINE CLINIC | Age: 49
End: 2019-09-13
Payer: MEDICARE

## 2019-09-13 VITALS
TEMPERATURE: 96.2 F | HEIGHT: 69 IN | SYSTOLIC BLOOD PRESSURE: 117 MMHG | HEART RATE: 87 BPM | WEIGHT: 194 LBS | DIASTOLIC BLOOD PRESSURE: 77 MMHG | BODY MASS INDEX: 28.73 KG/M2

## 2019-09-13 DIAGNOSIS — E78.00 HIGH CHOLESTEROL: ICD-10-CM

## 2019-09-13 DIAGNOSIS — R20.2 NUMBNESS AND TINGLING OF RIGHT LEG: Primary | ICD-10-CM

## 2019-09-13 DIAGNOSIS — R25.2 MUSCLE CRAMPING: ICD-10-CM

## 2019-09-13 DIAGNOSIS — F17.200 TOBACCO DEPENDENCE: ICD-10-CM

## 2019-09-13 DIAGNOSIS — R20.0 NUMBNESS AND TINGLING OF RIGHT LEG: Primary | ICD-10-CM

## 2019-09-13 PROCEDURE — 3017F COLORECTAL CA SCREEN DOC REV: CPT | Performed by: STUDENT IN AN ORGANIZED HEALTH CARE EDUCATION/TRAINING PROGRAM

## 2019-09-13 PROCEDURE — 4004F PT TOBACCO SCREEN RCVD TLK: CPT | Performed by: STUDENT IN AN ORGANIZED HEALTH CARE EDUCATION/TRAINING PROGRAM

## 2019-09-13 PROCEDURE — 99213 OFFICE O/P EST LOW 20 MIN: CPT | Performed by: STUDENT IN AN ORGANIZED HEALTH CARE EDUCATION/TRAINING PROGRAM

## 2019-09-13 PROCEDURE — G8419 CALC BMI OUT NRM PARAM NOF/U: HCPCS | Performed by: STUDENT IN AN ORGANIZED HEALTH CARE EDUCATION/TRAINING PROGRAM

## 2019-09-13 PROCEDURE — G8428 CUR MEDS NOT DOCUMENT: HCPCS | Performed by: STUDENT IN AN ORGANIZED HEALTH CARE EDUCATION/TRAINING PROGRAM

## 2019-09-13 RX ORDER — VARENICLINE TARTRATE 1 MG/1
1 TABLET, FILM COATED ORAL 2 TIMES DAILY
Qty: 60 TABLET | Refills: 3 | Status: SHIPPED | OUTPATIENT
Start: 2019-09-13 | End: 2021-02-24 | Stop reason: ALTCHOICE

## 2019-09-13 ASSESSMENT — ENCOUNTER SYMPTOMS
SHORTNESS OF BREATH: 0
VOMITING: 0
EYE PAIN: 0
COUGH: 0
NAUSEA: 0
ABDOMINAL PAIN: 0
PHOTOPHOBIA: 0
SORE THROAT: 0
RHINORRHEA: 0
WHEEZING: 0
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
## 2019-09-16 ENCOUNTER — HOSPITAL ENCOUNTER (OUTPATIENT)
Age: 49
Setting detail: SPECIMEN
Discharge: HOME | End: 2019-09-16
Payer: MEDICARE

## 2019-09-16 DIAGNOSIS — R20.0 NUMBNESS AND TINGLING OF RIGHT LEG: ICD-10-CM

## 2019-09-16 DIAGNOSIS — R20.2 NUMBNESS AND TINGLING OF RIGHT LEG: ICD-10-CM

## 2019-09-16 DIAGNOSIS — E78.00 HIGH CHOLESTEROL: ICD-10-CM

## 2019-09-16 DIAGNOSIS — R25.2 MUSCLE CRAMPING: ICD-10-CM

## 2019-09-16 LAB
ALBUMIN SERPL-MCNC: 4.2 G/DL (ref 3.5–5.2)
ALBUMIN/GLOBULIN RATIO: 1.3 (ref 1–2.5)
ALP BLD-CCNC: 60 U/L (ref 40–129)
ALT SERPL-CCNC: 31 U/L (ref 5–41)
ANION GAP SERPL CALCULATED.3IONS-SCNC: 14 MMOL/L (ref 9–17)
AST SERPL-CCNC: 26 U/L
BILIRUB SERPL-MCNC: 0.33 MG/DL (ref 0.3–1.2)
BUN BLDV-MCNC: 12 MG/DL (ref 6–20)
BUN/CREAT BLD: NORMAL (ref 9–20)
CALCIUM SERPL-MCNC: 9 MG/DL (ref 8.6–10.4)
CHLORIDE BLD-SCNC: 105 MMOL/L (ref 98–107)
CHOLESTEROL/HDL RATIO: 5.4
CHOLESTEROL: 209 MG/DL
CO2: 23 MMOL/L (ref 20–31)
CREAT SERPL-MCNC: 0.84 MG/DL (ref 0.7–1.2)
GFR AFRICAN AMERICAN: >60 ML/MIN
GFR NON-AFRICAN AMERICAN: >60 ML/MIN
GFR SERPL CREATININE-BSD FRML MDRD: NORMAL ML/MIN/{1.73_M2}
GFR SERPL CREATININE-BSD FRML MDRD: NORMAL ML/MIN/{1.73_M2}
GLUCOSE BLD-MCNC: 98 MG/DL (ref 70–99)
HDLC SERPL-MCNC: 39 MG/DL
LDL CHOLESTEROL: 128 MG/DL (ref 0–130)
POTASSIUM SERPL-SCNC: 4.2 MMOL/L (ref 3.7–5.3)
SODIUM BLD-SCNC: 142 MMOL/L (ref 135–144)
TOTAL PROTEIN: 7.4 G/DL (ref 6.4–8.3)
TRIGL SERPL-MCNC: 208 MG/DL
VLDLC SERPL CALC-MCNC: ABNORMAL MG/DL (ref 1–30)

## 2019-10-04 ENCOUNTER — OFFICE VISIT (OUTPATIENT)
Dept: FAMILY MEDICINE CLINIC | Age: 49
End: 2019-10-04
Payer: MEDICARE

## 2019-10-04 VITALS
TEMPERATURE: 97.7 F | WEIGHT: 194.8 LBS | HEIGHT: 69 IN | DIASTOLIC BLOOD PRESSURE: 72 MMHG | BODY MASS INDEX: 28.85 KG/M2 | HEART RATE: 84 BPM | SYSTOLIC BLOOD PRESSURE: 131 MMHG

## 2019-10-04 DIAGNOSIS — E78.5 HYPERLIPIDEMIA, UNSPECIFIED HYPERLIPIDEMIA TYPE: ICD-10-CM

## 2019-10-04 DIAGNOSIS — R20.0 NUMBNESS AND TINGLING OF RIGHT LEG: Primary | ICD-10-CM

## 2019-10-04 DIAGNOSIS — R20.2 NUMBNESS AND TINGLING OF RIGHT LEG: Primary | ICD-10-CM

## 2019-10-04 PROCEDURE — G8427 DOCREV CUR MEDS BY ELIG CLIN: HCPCS | Performed by: STUDENT IN AN ORGANIZED HEALTH CARE EDUCATION/TRAINING PROGRAM

## 2019-10-04 PROCEDURE — G8484 FLU IMMUNIZE NO ADMIN: HCPCS | Performed by: STUDENT IN AN ORGANIZED HEALTH CARE EDUCATION/TRAINING PROGRAM

## 2019-10-04 PROCEDURE — 3017F COLORECTAL CA SCREEN DOC REV: CPT | Performed by: STUDENT IN AN ORGANIZED HEALTH CARE EDUCATION/TRAINING PROGRAM

## 2019-10-04 PROCEDURE — 4004F PT TOBACCO SCREEN RCVD TLK: CPT | Performed by: STUDENT IN AN ORGANIZED HEALTH CARE EDUCATION/TRAINING PROGRAM

## 2019-10-04 PROCEDURE — G8419 CALC BMI OUT NRM PARAM NOF/U: HCPCS | Performed by: STUDENT IN AN ORGANIZED HEALTH CARE EDUCATION/TRAINING PROGRAM

## 2019-10-04 PROCEDURE — 99213 OFFICE O/P EST LOW 20 MIN: CPT | Performed by: STUDENT IN AN ORGANIZED HEALTH CARE EDUCATION/TRAINING PROGRAM

## 2019-10-04 ASSESSMENT — ENCOUNTER SYMPTOMS
WHEEZING: 0
COUGH: 0
RHINORRHEA: 0
VOMITING: 0
ABDOMINAL PAIN: 0
NAUSEA: 0
EYE PAIN: 0
SORE THROAT: 0
SHORTNESS OF BREATH: 0
PHOTOPHOBIA: 0

## 2020-08-28 ENCOUNTER — HOSPITAL ENCOUNTER (OUTPATIENT)
Dept: MRI IMAGING | Age: 50
Discharge: HOME | End: 2020-08-30
Payer: MEDICARE

## 2020-08-28 PROCEDURE — 72141 MRI NECK SPINE W/O DYE: CPT

## 2020-10-02 ENCOUNTER — OFFICE VISIT (OUTPATIENT)
Dept: FAMILY MEDICINE CLINIC | Age: 50
End: 2020-10-02
Payer: MEDICARE

## 2020-10-02 VITALS
TEMPERATURE: 96.8 F | SYSTOLIC BLOOD PRESSURE: 117 MMHG | WEIGHT: 207.8 LBS | HEART RATE: 89 BPM | HEIGHT: 69 IN | DIASTOLIC BLOOD PRESSURE: 71 MMHG | BODY MASS INDEX: 30.78 KG/M2

## 2020-10-02 PROCEDURE — 90686 IIV4 VACC NO PRSV 0.5 ML IM: CPT | Performed by: FAMILY MEDICINE

## 2020-10-02 PROCEDURE — G8482 FLU IMMUNIZE ORDER/ADMIN: HCPCS | Performed by: FAMILY MEDICINE

## 2020-10-02 PROCEDURE — 99211 OFF/OP EST MAY X REQ PHY/QHP: CPT | Performed by: FAMILY MEDICINE

## 2020-10-02 PROCEDURE — 4004F PT TOBACCO SCREEN RCVD TLK: CPT | Performed by: FAMILY MEDICINE

## 2020-10-02 PROCEDURE — 3017F COLORECTAL CA SCREEN DOC REV: CPT | Performed by: FAMILY MEDICINE

## 2020-10-02 PROCEDURE — G8417 CALC BMI ABV UP PARAM F/U: HCPCS | Performed by: FAMILY MEDICINE

## 2020-10-02 PROCEDURE — G8427 DOCREV CUR MEDS BY ELIG CLIN: HCPCS | Performed by: FAMILY MEDICINE

## 2020-10-02 PROCEDURE — 90732 PPSV23 VACC 2 YRS+ SUBQ/IM: CPT | Performed by: FAMILY MEDICINE

## 2020-10-02 PROCEDURE — 99213 OFFICE O/P EST LOW 20 MIN: CPT | Performed by: FAMILY MEDICINE

## 2020-10-02 RX ORDER — MELOXICAM 7.5 MG/1
TABLET ORAL
Status: ON HOLD | COMMUNITY
Start: 2020-08-05 | End: 2021-04-19

## 2020-10-02 RX ORDER — NICOTINE 21 MG/24HR
1 PATCH, TRANSDERMAL 24 HOURS TRANSDERMAL DAILY
Qty: 42 PATCH | Refills: 0 | Status: SHIPPED | OUTPATIENT
Start: 2020-10-02 | End: 2021-01-27 | Stop reason: ALTCHOICE

## 2020-10-02 NOTE — PATIENT INSTRUCTIONS
Thank you for letting us take care of you today. We hope all your questions were addressed. If a question was overlooked or something else comes to mind after you return home, please contact a member of your Care Team listed below. Your Care Team at [place] [address] is Team #2  Deborah Temple DO (Faculty)  Romeo Armstrong (Faculty)  Lisa White MD (Resident)  Varsha George MD (Resident)  Mei Bonilla MD (Resident)  Clementina Cardozo MD (Resident)  Jason Sousa MD (Resident)  PATITO Mcclain ,MILES PAGAN [facility] ([address] office)  Jes Dunne, [address] (Clinical Practice Manager)  Kervin Mccain [facility] (Clinical Pharmacist)     Office phone number: [phone]    If you need to get in right away due to illness, please be advised we have \"Same Day\" appointments available Monday-Friday. Please call us at [phone] option #3 to schedule your \"Same Day\" appointment.

## 2020-10-02 NOTE — PROGRESS NOTES
Subjective:      Patient ID: Harshad Delgado is a 44 y.o. male. HPI    Shay  Neck and back pain persist  Feels out of shape  Quit tobacco    Review of Systems    Negative for:     Worry / mood complaints  Headache  Dizziness  Visual Disturbance  Hearing Changes  Nasal / sinus Symptoms  Mouth / tooth symptom, pain  Throat pain  Difficulty swallowing  Neck pain  Chest discomfort  Cough  SOB  N/V/D/C  Pelvic area discomfort  Bladder / voiding discomfort  Bowel complaints  MS complaints   Numbness/tingling/abnormal sensations   Edema / Leg swelling  Dizziness  Fatigue  Bleeding   Skin    Pertinent Pos: See HPI    Objective:   Physical Exam     Alert and oriented to PPT  NAD    HEENT - neg  Neck - no bruits, no lymphadenopathy  Chest  HRRR w/o murmer  LCTAB no wheezes / rhonchi  Abdomen - soft, non-tender, BS  Extremities - 0+ PTE    Gait / Station - stable, no dysequilibrium, uniform pace, no assist device, cane. Assessment:       Diagnosis Orders   1. Colon cancer screening  Cologuard   2.  Tobacco dependence  nicotine (NICODERM CQ) 21 MG/24HR           Plan:          cologuard  tobaco tx - chantix not sure if helpful; wants nicoderm  When pat ready will call in for 14 mg patch to step down  Tends to stay inside for now  [address]una, DO

## 2020-10-02 NOTE — PROGRESS NOTES
Visit Information    Have you changed or started any medications since your last visit including any over-the-counter medicines, vitamins, or herbal medicines? no   Have you stopped taking any of your medications? Is so, why? -  no  Are you having any side effects from any of your medications? - no    Have you seen any other physician or provider since your last visit?  no   Have you had any other diagnostic tests since your last visit?  no   Have you been seen in the emergency room and/or had an admission in a hospital since we last saw you?  no   Have you had your routine dental cleaning in the past 6 months?  no     Do you have an active MyChart account? If no, what is the barrier? No: Inactive    Patient Care Team:  Randee Camilo DO as PCP - General (Family Medicine)  Randee Camilo DO as PCP - [facility] Provider    Medical History Review  Past Medical, Family, and Social History reviewed and does not contribute to the patient presenting condition    Health Maintenance   Topic Date Due    Pneumococcal 0-64 years Vaccine (1 of 1 - PPSV23) 05/23/1974    Shingles Vaccine (1 of 2) 05/23/2018    Colon cancer screen colonoscopy  05/23/2018    Flu vaccine (1) 09/01/2020    Lipid screen  09/16/2024    DTaP/Tdap/Td vaccine (3 - Td) 11/27/2027    HIV screen  Completed    Hepatitis A vaccine  Aged Out    Hepatitis B vaccine  Aged Out    Hib vaccine  Aged Out    Meningococcal (ACWY) vaccine  Aged Out       Vaccine Information Sheet, \"Influenza - Inactivated\"  given to [facility], or parent/legal guardian of  [facility] and verbalized understanding. Patient responses:    Have you ever had a reaction to a flu vaccine? No  Do you have any current illness? No  Have you ever had Guillian Maribel Syndrome? No  Do you have a serious allergy to any of the following: Neomycin, Polymyxin, Thimerosal, eggs or egg products? No    Flu vaccine given per order. Please see immunization tab.     Risks and benefits explained. Current VIS given.

## 2021-01-11 ENCOUNTER — OFFICE VISIT (OUTPATIENT)
Dept: FAMILY MEDICINE CLINIC | Age: 51
End: 2021-01-11
Payer: MEDICARE

## 2021-01-11 ENCOUNTER — NURSE TRIAGE (OUTPATIENT)
Dept: OTHER | Facility: CLINIC | Age: 51
End: 2021-01-11

## 2021-01-11 VITALS
WEIGHT: 211.6 LBS | DIASTOLIC BLOOD PRESSURE: 79 MMHG | HEART RATE: 99 BPM | TEMPERATURE: 97.2 F | BODY MASS INDEX: 31.23 KG/M2 | SYSTOLIC BLOOD PRESSURE: 133 MMHG

## 2021-01-11 DIAGNOSIS — R20.0 NUMBNESS AND TINGLING OF RIGHT LEG: Primary | ICD-10-CM

## 2021-01-11 DIAGNOSIS — R20.2 NUMBNESS AND TINGLING OF RIGHT LEG: Primary | ICD-10-CM

## 2021-01-11 LAB — HBA1C MFR BLD: 5.4 %

## 2021-01-11 PROCEDURE — 83036 HEMOGLOBIN GLYCOSYLATED A1C: CPT | Performed by: STUDENT IN AN ORGANIZED HEALTH CARE EDUCATION/TRAINING PROGRAM

## 2021-01-11 PROCEDURE — 3017F COLORECTAL CA SCREEN DOC REV: CPT | Performed by: STUDENT IN AN ORGANIZED HEALTH CARE EDUCATION/TRAINING PROGRAM

## 2021-01-11 PROCEDURE — G8417 CALC BMI ABV UP PARAM F/U: HCPCS | Performed by: STUDENT IN AN ORGANIZED HEALTH CARE EDUCATION/TRAINING PROGRAM

## 2021-01-11 PROCEDURE — 99213 OFFICE O/P EST LOW 20 MIN: CPT | Performed by: STUDENT IN AN ORGANIZED HEALTH CARE EDUCATION/TRAINING PROGRAM

## 2021-01-11 PROCEDURE — G8482 FLU IMMUNIZE ORDER/ADMIN: HCPCS | Performed by: STUDENT IN AN ORGANIZED HEALTH CARE EDUCATION/TRAINING PROGRAM

## 2021-01-11 PROCEDURE — G8427 DOCREV CUR MEDS BY ELIG CLIN: HCPCS | Performed by: STUDENT IN AN ORGANIZED HEALTH CARE EDUCATION/TRAINING PROGRAM

## 2021-01-11 PROCEDURE — 1036F TOBACCO NON-USER: CPT | Performed by: STUDENT IN AN ORGANIZED HEALTH CARE EDUCATION/TRAINING PROGRAM

## 2021-01-11 SDOH — ECONOMIC STABILITY: FOOD INSECURITY: WITHIN THE PAST 12 MONTHS, THE FOOD YOU BOUGHT JUST DIDN'T LAST AND YOU DIDN'T HAVE MONEY TO GET MORE.: NEVER TRUE

## 2021-01-11 SDOH — ECONOMIC STABILITY: TRANSPORTATION INSECURITY
IN THE PAST 12 MONTHS, HAS THE LACK OF TRANSPORTATION KEPT YOU FROM MEDICAL APPOINTMENTS OR FROM GETTING MEDICATIONS?: NOT ASKED

## 2021-01-11 ASSESSMENT — ENCOUNTER SYMPTOMS
COLOR CHANGE: 0
SHORTNESS OF BREATH: 0
COUGH: 0
APNEA: 0
CHEST TIGHTNESS: 0

## 2021-01-11 ASSESSMENT — PATIENT HEALTH QUESTIONNAIRE - PHQ9
2. FEELING DOWN, DEPRESSED OR HOPELESS: 1
SUM OF ALL RESPONSES TO PHQ9 QUESTIONS 1 & 2: 2

## 2021-01-11 NOTE — TELEPHONE ENCOUNTER
Reason for Disposition   Neurologic deficit that was brief (now gone), ANY of the following: * Weakness of the face, arm, or leg on one side of the body * Numbness of the face, arm, or leg on one side of the body * Loss of speech or garbled speech    Answer Assessment - Initial Assessment Questions  1. SYMPTOM: \"What is the main symptom you are concerned about? \" (e.g., weakness, numbness)      Numbness in upper right thigh    2. ONSET: \"When did this start? \" (minutes, hours, days; while sleeping)      Six months ago on and off    3. LAST NORMAL: \"When was the last time you were normal (no symptoms)? \"      N/a    4. PATTERN \"Does this come and go, or has it been constant since it started? \"  \"Is it present now? \"      Comes and goes    5. CARDIAC SYMPTOMS: \"Have you had any of the following symptoms: chest pain, difficulty breathing, palpitations? \"      No    6. NEUROLOGIC SYMPTOMS: \"Have you had any of the following symptoms: headache, dizziness, vision loss, double vision, changes in speech, unsteady on your feet? \"      No    7. OTHER SYMPTOMS: \"Do you have any other symptoms? \"      Upper thigh feels hot denies pain or redness  Denies swelling    8. PREGNANCY: \"Is there any chance you are pregnant? \" \"When was your last menstrual period? \"      n/a    Protocols used: NEUROLOGIC DEFICIT-ADULT-[place]    Caller provided care advice and instructed to call back with worsening symptoms. Attention Provider: Thank you for allowing me to participate in the care of your patient. The patient was connected to triage in response to information provided to the [facility]. Please do not respond through this encounter as the response is not directed to a shared pool.      Warm transfer to [facility]
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
## 2021-01-11 NOTE — PROGRESS NOTES
Visit Information    Have you changed or started any medications since your last visit including any over-the-counter medicines, vitamins, or herbal medicines? no   Have you stopped taking any of your medications? Is so, why? -  no  Are you having any side effects from any of your medications? - no    Have you seen any other physician or provider since your last visit? yes - orthopedic/pain management   Have you had any other diagnostic tests since your last visit?  no   Have you been seen in the emergency room and/or had an admission in a hospital since we last saw you?  no   Have you had your routine dental cleaning in the past 6 months?  no     Do you have an active MyChart account? If no, what is the barrier?   No: declined    Patient Care Team:  Jahaira Guy DO as PCP - General (Family Medicine)  Jahair[facility] Guy DO [facility] PCP [facility]    Medical History Review  Past Medical, Family, and Social History reviewed and does not contribute to the patient presenting condition    Health Maintenance   Topic Date Due    Hepatitis C screen  1970    Shingles Vaccine (1 of 2) 05/23/2018    Colon cancer screen colonoscopy  05/23/2018    Lipid screen  09/16/2024    DTaP/Tdap/Td vaccine (3 - Td) 11/27/2027    Flu vaccine  Completed    Pneumococcal 0-64 years Vaccine  Completed    HIV screen  Completed    Hepatitis A vaccine  Aged Out    Hepatitis B vaccine  Aged Out    Hib vaccine  Aged Out    Meningococcal (ACWY) vaccine  Aged Out

## 2021-01-14 ENCOUNTER — HOSPITAL ENCOUNTER (OUTPATIENT)
Dept: PHYSICAL THERAPY | Age: 51
Setting detail: THERAPIES SERIES
Discharge: HOME | End: 2021-01-14
Payer: MEDICARE

## 2021-01-14 PROCEDURE — 97162 PT EVAL MOD COMPLEX 30 MIN: CPT
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
## 2021-01-19 ENCOUNTER — HOSPITAL ENCOUNTER (OUTPATIENT)
Dept: PHYSICAL THERAPY | Age: 51
Setting detail: THERAPIES SERIES
Discharge: HOME | End: 2021-01-19
Payer: MEDICARE

## 2021-01-19 PROCEDURE — 97110 THERAPEUTIC EXERCISES: CPT

## 2021-01-19 NOTE — FLOWSHEET NOTE
[x] [facility]  [facility]  [address]  [phone]  F: [phone] [] [address]  [facility]   [address]  P: [phone]  F: [phone] [] [address]  Therapy  [address]  P: [phone]  F: [phone] [] [address]  P: [phone]  F: [phone] [] [address]  [facility]   [address]   [place]: [phone]  F: [phone]      Physical Therapy Daily Treatment Note    Date:  2021  Patient Name:  Maria Teresa Hitchcock    :  1970  MRN: [id number]  Physician:  Susana Stephenson                              Insurance: [place] Advantage (limit 30 Rx)  Medical Diagnosis: Numbness, tingling R LE R20.0, R20.2, LBP M54.5                 Rehab Codes: M79.651, M25.551, M54.5, M25.651, R20.2, R20.3, R29.3, R26.2, R26.81  Onset Date: 2020                                  Next 's appt: unknown    Visit# / total visits: 2/10      Cancels/No Shows: 0/0    Subjective:    Pain:  [x] Yes  [] No Location: R thigh/LB   Pain Rating: (0-10 scale) 5/10  Pain altered Tx:  [x] No  [] Yes  Action:  Comments: Pt states he has 5/10 pain upon arrival in his right thigh and low back. Says his pain goes from his hip down to his knee.     Objective:  Modalities:   Precautions:  Exercises:  Exercise Reps/ Time Weight/ Level Comments   Nustep 5'           Standing       Calf stretch 20\"x3     Heel raises  x15           Supine       trA iso  20\"x3     Marches  x15  Verbal cues for core stabilization    SAQ x15     HS stretch  20\"x3  With nerve flossing, DF/PF throughout    Bridges x15     Hip Abduction  x15 Lime     Hip Adduction 3\"x15 with ball    Clam shells  x15 Lime Sciatic N. Glide  x15  Hip/knee flexed to 90 degrees -- knee ext as far as tolerated, return to 90 degrees. Seated       LAQ x15  Only able to complete 2 due to pain 1/19                                 Other:      Treatment Charges: Mins Units   []  Modalities     [x]  Ther Exercise 45 3   []  Manual Therapy     []  Ther Activities     []  Aquatics     []  Vasocompression     []  Other     Total Treatment time 45 3       Assessment: [x] Progressing toward goals. Initiated therapeutic exercises as noted above this date with fair tolerance to most exercises. Pt with poor tolerance to LAQ due to pain he was having upon arrival however, was able to tolerate SAQ with no increased pain noted. Pt states he feels fatigued post tx however, states he has no increased pain or discomfort. Discussed application of HP for pain reduction which patient denied this date. Also, discussed with patient benefits of E-stim for future application with good verbal understanding noted. [] No change. [] Other:  [x] Patient would continue to benefit from skilled physical therapy services in order to: decrease pain, increase trunk and LE strength and hip, and lumbar ROM, and increase functional mobility, including increase tolerance to activity.             STG: (to be met in 10 treatments)  1. ? Pain: LB, R thigh 3/10 average pain, 6/10 at worst  2. ? ROM: R hip flex 105°, abd 30°; Lumbar flex 62°, Ext 16°  3. ? Strength: B hips, 4-/5; R knee 4-/5, L knee 4/5, R DF 4/5  4. ? Balance: Tinetti score 25 indicating low risk of falls  5. ? Function: LEFS 60% loss of LE function  6. Pt report increased tolerance to household activities, increase duration able to perform each activity without increased pain  7. Patient to be independent with home exercise program as demonstrated by performance with correct form without cues.       Patient goals: \"To control/manage pain\"    Pt.  Education:  [x] Yes  [] No  [] Reviewed Prior HEP/Ed Method of Education: [x] Verbal  [x] Demo  [] Written  Comprehension of Education:  [x] Verbalizes understanding. [x] Demonstrates understanding. [] Needs review. [] Demonstrates/verbalizes HEP/Ed previously given. Plan: [x] Continue current frequency toward long and short term goals. [x] Specific Instructions for subsequent treatments:  Issue HEP, progress to standing as tolerated. Time In: 9:00am           Time Out: 9:45am    Electronically signed by:  Susan Villa    Patient treated and note written by Susan Villa, Student Physical Therapist Assistant under supervision of Kate Haddad PTA.

## 2021-01-21 ENCOUNTER — HOSPITAL ENCOUNTER (OUTPATIENT)
Dept: PHYSICAL THERAPY | Age: 51
Setting detail: THERAPIES SERIES
Discharge: HOME | End: 2021-01-21
Payer: MEDICARE

## 2021-01-21 PROCEDURE — 97110 THERAPEUTIC EXERCISES: CPT

## 2021-01-21 NOTE — FLOWSHEET NOTE
[x] [facility]  [facility]  [address]  [phone]  F: [phone] [] [address]  [facility] [address]   [address]  P: [phone]  F: [phone] [] [address]  Therapy  [address] [facility]  P: [phone]  F: [phone] [] [address]  P: [phone]  F: [phone] [] [address]  [facility]   [address]   [place]: [phone]  F: [phone]      Physical Therapy Daily Treatment Note    Date:  2021  Patient Name:  Radhika Katz    :  1970  MRN: [id number]  Physician:  Leonora Stephenson                              Insurance: [place] Advantage (limit 30 Rx)  Medical Diagnosis: Numbness, tingling R LE R20.0, R20.2, LBP M54.5                 Rehab Codes: M79.651, M25.551, M54.5, M25.651, R20.2, R20.3, R29.3, R26.2, R26.81  Onset Date: 2020                                  Next 's appt: unknown    Visit# / total visits: 3/10      Cancels/No Shows: 0/0    Subjective:    Pain:  [x] Yes  [] No Location: R thigh/LB   Pain Rating: (0-10 scale) 5/10  Pain altered Tx:  [x] No  [] Yes  Action:  Comments: Pt arrives with 5/10 pain today in his low back and R thigh. Says he felt a little sore after last tx session but \"nothing to bad. \" Patient says his balance has been off so he has been using a cane to keep from falling. States he has been having some cognitive issue, says he applied for disability but was denied so he is only coming to therapy in hopes to get approved.   Objective:  Modalities:   Precautions:  Exercises:  Exercise Reps/ Time Weight/ Level Comments   Nustep 5' 2          Standing       Calf stretch 20\"x3     Heel raises  x15     3 way hip  10 ea  Added    SLS  20\"x2 ea  Added  Supine       trA iso  3\"x15     Marches  x15  Verbal cues for core stabilization    SAQ x15  3\" hold    HS stretch  20\"x3  With nerve flossing, DF/PF throughout    Bridges x15     Hip Abduction  x15 Lime     Hip Adduction 3\"x15 with ball    Clam shells  x15 Lime     Sciatic N. Glide  x15  Hip/knee flexed to 90 degrees -- knee ext as far as tolerated, return to 90 degrees. -- could on tolerate 11 on R LE 1/21 due to increased discomfort. LTR 5x ea  Added 1/21         Sidelying: Added 1/21   Hip abduction  x10 ea     Clams  x10 ea           Seated       LAQ x15     Heel/Toe raises  x15  Added 1/21   HS stretch  30\"x2  Added 1/21                     Other:      Treatment Charges: Mins Units   []  Modalities     [x]  Ther Exercise 40 3   []  Manual Therapy     []  Ther Activities     []  Aquatics     []  Vasocompression     []  Other     Total Treatment time 40 3       Assessment: [x] Progressing toward goals. Added exercises noted above this date to increase strength and function. Patient displayed good tolerance to all exercises given however, states he had some discomfort in his R LE with sciatic N. Glides, was unable to complete all reps. Pt with no increased pain or feeling of discomfort in thigh with use of theraband during resisted exercises. Patient verbalized feeling dizzy going from supine to side lying, was instructed to move with slower motions when changing direction. Again, discussed benefits of E-stim/HP for LB pain which patient denied this date. [] No change.      [] Other:  [x] Patient would continue to benefit from skilled physical therapy services in order to: decrease pain, increase trunk and LE strength and hip, and lumbar ROM, and increase functional mobility, including increase tolerance to activity.             STG: (to be met in 10 treatments)  1. ? Pain: LB, R thigh 3/10 average pain, 6/10 at worst  2. ? ROM: R hip flex 105°, abd 30°; Lumbar flex 62°, Ext 16° 3. ? Strength: B hips, 4-/5; R knee 4-/5, L knee 4/5, R DF 4/5  4. ? Balance: Tinetti score 25 indicating low risk of falls  5. ? Function: LEFS 60% loss of LE function  6. Pt report increased tolerance to household activities, increase duration able to perform each activity without increased pain  7. Patient to be independent with home exercise program as demonstrated by performance with correct form without cues.       Patient goals: \"To control/manage pain\"    Pt. Education:  [x] Yes  [] No  [] Reviewed Prior HEP/Ed  Method of Education: [x] Verbal  [x] Demo  [] Written  Comprehension of Education:  [x] Verbalizes understanding. [x] Demonstrates understanding. [] Needs review. [] Demonstrates/verbalizes HEP/Ed previously given. Plan: [x] Continue current frequency toward long and short term goals. [x] Specific Instructions for subsequent treatments:  Issue HEP, progress to standing as tolerated. Time In: 1:00pm           Time Out: 1:48pm    Electronically signed by:  Estuardo Boyle    Patient treated and note written by Estuardo Boyle, Student Physical Therapist Assistant under supervision of Swati Patel PTA.

## 2021-01-26 ENCOUNTER — HOSPITAL ENCOUNTER (OUTPATIENT)
Dept: PHYSICAL THERAPY | Age: 51
Setting detail: THERAPIES SERIES
Discharge: HOME | End: 2021-01-26
Payer: MEDICARE
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
## 2021-01-27 ENCOUNTER — OFFICE VISIT (OUTPATIENT)
Dept: FAMILY MEDICINE CLINIC | Age: 51
End: 2021-01-27
Payer: MEDICARE

## 2021-01-27 VITALS
WEIGHT: 210 LBS | SYSTOLIC BLOOD PRESSURE: 124 MMHG | HEART RATE: 94 BPM | BODY MASS INDEX: 31.1 KG/M2 | DIASTOLIC BLOOD PRESSURE: 74 MMHG | HEIGHT: 69 IN | TEMPERATURE: 97.9 F

## 2021-01-27 DIAGNOSIS — Z12.11 ENCOUNTER FOR COLORECTAL CANCER SCREENING: ICD-10-CM

## 2021-01-27 DIAGNOSIS — R41.89 COGNITIVE AND BEHAVIORAL CHANGES: Primary | ICD-10-CM

## 2021-01-27 DIAGNOSIS — Z12.12 ENCOUNTER FOR COLORECTAL CANCER SCREENING: ICD-10-CM

## 2021-01-27 DIAGNOSIS — R20.8 DYSESTHESIA: ICD-10-CM

## 2021-01-27 DIAGNOSIS — R46.89 COGNITIVE AND BEHAVIORAL CHANGES: Primary | ICD-10-CM

## 2021-01-27 DIAGNOSIS — F17.200 TOBACCO DEPENDENCE: ICD-10-CM

## 2021-01-27 PROCEDURE — G8482 FLU IMMUNIZE ORDER/ADMIN: HCPCS | Performed by: FAMILY MEDICINE

## 2021-01-27 PROCEDURE — 1036F TOBACCO NON-USER: CPT | Performed by: FAMILY MEDICINE

## 2021-01-27 PROCEDURE — G8417 CALC BMI ABV UP PARAM F/U: HCPCS | Performed by: FAMILY MEDICINE

## 2021-01-27 PROCEDURE — 99213 OFFICE O/P EST LOW 20 MIN: CPT | Performed by: FAMILY MEDICINE

## 2021-01-27 PROCEDURE — G8427 DOCREV CUR MEDS BY ELIG CLIN: HCPCS | Performed by: FAMILY MEDICINE

## 2021-01-27 PROCEDURE — 99211 OFF/OP EST MAY X REQ PHY/QHP: CPT | Performed by: FAMILY MEDICINE

## 2021-01-27 PROCEDURE — 3017F COLORECTAL CA SCREEN DOC REV: CPT | Performed by: FAMILY MEDICINE

## 2021-01-27 RX ORDER — NICOTINE 21 MG/24HR
1 PATCH, TRANSDERMAL 24 HOURS TRANSDERMAL EVERY 24 HOURS
Qty: 30 PATCH | Refills: 1 | Status: SHIPPED | OUTPATIENT
Start: 2021-01-27 | End: 2022-02-28

## 2021-01-27 NOTE — PATIENT INSTRUCTIONS
Thank you for letting us take care of you today. We hope all your questions were addressed. If a question was overlooked or something else comes to mind after you return home, please contact a member of your Care Team listed below. Your Care Team at [place] [address] is Team #2  Diogo Ballesteros DO (Faculty)  Deja Vazquez (Faculty)  Jaci Esteves MD (Resident)  Boni Spicer MD (Resident)  Maulik Lyn MD (Resident)  Trina Parker MD (Resident)  Melecio Evans MD (Resident)  PATITO Hough ,LAUREL PAGAN [facility] ([address] [facility] office)  Ney Byrd, [address] (Clinical Practice Manager)  Leeann Gutierrez, [address] [place] [address] (Clinical Pharmacist)     Office phone number: [phone]    If you need to get in right away due to illness, please be advised we have \"Same Day\" appointments available Monday-Friday. Please call us at [phone] option #3 to schedule your \"Same Day\" appointment.

## 2021-01-27 NOTE — PROGRESS NOTES
Visit Information    Have you changed or started any medications since your last visit including any over-the-counter medicines, vitamins, or herbal medicines? no   Have you stopped taking any of your medications? Is so, why? -  no  Are you having any side effects from any of your medications? - no    Have you seen any other physician or provider since your last visit? yes - PT   Have you had any other diagnostic tests since your last visit?  no   Have you been seen in the emergency room and/or had an admission in a hospital since we last saw you?  no   Have you had your routine dental cleaning in the past 6 months?  no     Do you have an active MyChart account? If no, what is the barrier?   No: Declined    Patient Care Team:  Ney Abbott DO as PCP - General (Family Medicine)  Ney Abbott DO as PCP - [facility] Provider    Medical History Review  Past Medical, Family, and Social History reviewed and does not contribute to the patient presenting condition    Health Maintenance   Topic Date Due    Hepatitis C screen  1970    Shingles Vaccine (1 of 2) 05/23/2018    Colon cancer screen colonoscopy  05/23/2018    Lipid screen  09/16/2024    DTaP/Tdap/Td vaccine (3 - Td) 11/27/2027    Flu vaccine  Completed    HIV screen  Completed    Hepatitis A vaccine  Aged Out    Hepatitis B vaccine  Aged Out    Hib vaccine  Aged Out    Meningococcal (ACWY) vaccine  Aged Out    Pneumococcal 0-64 years Vaccine  Aged Out
Reorder nicoderm 14 mg patch - 2 mos then taper to 7 mg. Monitor patient over 6 mos - will need advocacy to assist in his application process for LTD.       Electronicallysigned by Gilberto Hernandez DO on 1/27/2021 at 8:10 PM

## 2021-01-28 ENCOUNTER — HOSPITAL ENCOUNTER (OUTPATIENT)
Dept: PHYSICAL THERAPY | Age: 51
Setting detail: THERAPIES SERIES
Discharge: HOME | End: 2021-01-28
Payer: MEDICARE

## 2021-01-28 PROCEDURE — 97110 THERAPEUTIC EXERCISES: CPT

## 2021-01-28 NOTE — FLOWSHEET NOTE
[x] [facility]) - [facility] &  Therapy  [address]  [phone]  F: [phone] [] [address]  [facility] [address]   [address]  P: [phone]  F: [phone] [] [address]  Therapy  [address]  P: [phone]  F: [phone] [] [address]  P: [phone]  F: [phone] [] [address]  [facility]   [address]   [place]: [phone]  F: [phone]      Physical Therapy Daily Treatment Note    Date:  2021  Patient Name:  Oscar Lyon    :  1970  MRN: [id number]  Physician:  Charles Stephenson                              Insurance: [place] Advantage (limit 30 Rx)  Medical Diagnosis: Numbness, tingling R LE R20.0, R20.2, LBP M54.5                 Rehab Codes: M79.651, M25.551, M54.5, M25.651, R20.2, R20.3, R29.3, R26.2, R26.81  Onset Date: 2020                                  Next 's appt: unknown    Visit# / total visits: 4/10      Cancels/No Shows: 1/0    Subjective:    Pain:  [x] Yes  [] No Location: R thigh/LB   Pain Rating: (0-10 scale) 5/10  Pain altered Tx:  [x] No  [] Yes  Action:  Comments: Patient states he has 5/10 pain today and feeling very tired.   Objective:  Modalities:   Precautions:  Exercises:  Exercise Reps/ Time Weight/ Level Comments   Nustep 5' 2          Standing       Calf stretch 20\"x3     Heel raises  x20  Increased reps    3 way hip  10 ea     SLS  20\"x2 ea     HS stretch  30\"x2  Added          Supine       trA iso  3\"x15     Marches  2x10  Verbal cues for core stabilization -- Increased reps    SAQ 2x10ea  3\" hold -- increased reps    HS stretch  20\"x3  With nerve flossing, DF/PF throughout    Bridges 15x 6. Pt report increased tolerance to household activities, increase duration able to perform each activity without increased pain  7. Patient to be independent with home exercise program as demonstrated by performance with correct form without cues.       Patient goals: \"To control/manage pain\"    Pt. Education:  [x] Yes  [] No  [] Reviewed Prior HEP/Ed  Method of Education: [x] Verbal  [x] Demo  [x] Written  Comprehension of Education:  [x] Verbalizes understanding. [x] Demonstrates understanding. [x] Needs review. [] Demonstrates/verbalizes HEP/Ed previously given. HEP 1/28 Standing hip abduction, hip extension, hip flexion, clamshells, hip abduction, LAQ, hamstring stretch. Plan: [x] Continue current frequency toward long and short term goals. [x] Specific Instructions for subsequent treatments: progress to standing as tolerated. Time In: 10:00am           Time Out: 10:46am    Electronically signed by:  Jaida Perez    Patient treated and note written by Jaida Perez, Student Physical Therapist Assistant under supervision of George Espinal PTA.

## 2021-02-02 ENCOUNTER — HOSPITAL ENCOUNTER (OUTPATIENT)
Dept: PHYSICAL THERAPY | Age: 51
Setting detail: THERAPIES SERIES
Discharge: HOME | End: 2021-02-02
Payer: MEDICARE

## 2021-02-02 PROCEDURE — 97110 THERAPEUTIC EXERCISES: CPT

## 2021-02-02 NOTE — FLOWSHEET NOTE
[x] [facility]) [facility] &  Therapy  [address]  [phone]  F: [phone] [] [address]  [place] [address]   [address]  P: [phone]  F: [phone] [] [place]  [address]  P: [phone]  F: [phone] [] [address]  P: [phone]  F: [phone] [] [address]  [facility]   [address]   [place]: [phone]  F: [phone]      Physical Therapy Daily Treatment Note    Date:  2021  Patient Name:  Radhika Katz    :  1970  MRN: [id number]  [facility]  [facility]                              Insurance: [place] Advantage (limit 30 Rx)  Medical Diagnosis: Numbness, tingling R LE R20.0, R20.2, LBP M54.5                 Rehab Codes: M79.651, M25.551, M54.5, M25.651, R20.2, R20.3, R29.3, R26.2, R26.81  Onset Date: 2020                                  Next 's appt: unknown    Visit# / total visits: 5/10      Cancels/No Shows: 1/0    Subjective:    Pain:  [x] Yes  [] No Location: R thigh/LB   Pain Rating: (0-10 scale) 6-7/10  Pain altered Tx:  [x] No  [] Yes  Action:  Comments: Patient states he has 6-7/10 pain today. Says his increased pain is from shoveling over the weekend.   Objective:  Modalities:   Precautions:  Exercises:  Exercise Reps/ Time Weight/ Level Comments   Nustep 5' 2          Standing       Calf stretch 20\"x3     Heel raises  x20     3 way hip  10 ea  On foam -- added foam 2/2   SLS  20\"x2 ea  On foam -- Added foam 2/2   HS stretch  30\"x2     Step ups  10x ea  Added 2/2   Mini squats  10x ea  Added 2/2         Supine       trA iso  3\"x15     Marches  2x10  Verbal cues for core stabilization   SAQ 2x10ea  3\" hold HS stretch  20\"x3  With nerve flossing, DF/PF throughout    Bridges 15x      Hip Abduction  2x10 Blue   Increased resistance 2/2   Hip Adduction 3\"x15 with ball    Sciatic N. Glide  x15  Hip/knee flexed to 90 degrees -- knee ext as far as tolerated, return to 90 degrees. -- held 2/2, patient request    LTR 5x5\" ea           Sidelying:      Hip abduction  2x10 ea     Clams  2x10 ea [place]  Added resistance 2/2         Seated       LAQ x20     Heel/Toe raises  x20     HS stretch  30\"x2                       Other:      Treatment Charges: Mins Units   []  Modalities     [x]  Ther Exercise 46 3   []  Manual Therapy     []  Ther Activities     []  Aquatics     []  Vasocompression     []  Other     Total Treatment time 46 3       Assessment: [x] Progressing toward goals. Added squats and step ups this date to increase strength and function. Patient able to tolerate added exercises with no increased pain or discomfort. Progressed balance exercises as noted above, patient states foam exercises are challenging however, able to complete all reps with 1 UE assist. Patient notes being fatigue post tx. Will continue to progress as tolerates. [] No change. [] Other:  [x] Patient would continue to benefit from skilled physical therapy services in order to: decrease pain, increase trunk and LE strength and hip, and lumbar ROM, and increase functional mobility, including increase tolerance to activity.             STG: (to be met in 10 treatments)  1. ? Pain: LB, R thigh 3/10 average pain, 6/10 at worst  2. ? ROM: R hip flex 105°, abd 30°; Lumbar flex 62°, Ext 16°  3. ? Strength: B hips, 4-/5; R knee 4-/5, L knee 4/5, R DF 4/5  4. ? Balance: Tinetti score 25 indicating low risk of falls  5. ? Function: LEFS 60% loss of LE function  6.  Pt report increased tolerance to household activities, increase duration able to perform each activity without increased pain 7. Patient to be independent with home exercise program as demonstrated by performance with correct form without cues.       Patient goals: \"To control/manage pain\"    Pt. Education:  [x] Yes  [] No  [x] Reviewed Prior HEP/Ed  Method of Education: [x] Verbal  [x] Demo  [] Written  Comprehension of Education:  [x] Verbalizes understanding. [x] Demonstrates understanding. [x] Needs review. [] Demonstrates/verbalizes HEP/Ed previously given. HEP 1/28 Standing hip abduction, hip extension, hip flexion, clamshells, hip abduction, LAQ, hamstring stretch. Plan: [x] Continue current frequency toward long and short term goals. [x] Specific Instructions for subsequent treatments: progress standing as tolerated. Time In: 10:30am           Time Out: 11:16am    Electronically signed by:  Zak Weiner    Patient treated and note written by Zak Weiner, Student Physical Therapist Assistant under supervision of Malka Ramos PTA.

## 2021-02-04 ENCOUNTER — HOSPITAL ENCOUNTER (OUTPATIENT)
Dept: PHYSICAL THERAPY | Age: 51
Setting detail: THERAPIES SERIES
Discharge: HOME | End: 2021-02-04
Payer: MEDICARE

## 2021-02-04 PROCEDURE — 97110 THERAPEUTIC EXERCISES: CPT

## 2021-02-04 NOTE — FLOWSHEET NOTE
[x] [facility]) [facility] &  Therapy  [address]  [phone]  F: [phone] [] [address]  [employer] [address]   [address]  P: [phone]  F: [phone] [] [place]  [address]  P: [phone]  F: [phone] [] [address]  P: [phone]  F: [phone] [] [address]  [facility]   [address]   [place]: [phone]  F: [phone]      Physical Therapy Daily Treatment Note    Date:  2021  Patient Name:  Simmie Halsted    :  1970  MRN: [id number]  [facility]                              Insurance: [place] Advantage (limit 30 Rx)  Medical Diagnosis: Numbness, tingling R LE R20.0, R20.2, LBP M54.5                 Rehab Codes: M79.651, M25.551, M54.5, M25.651, R20.2, R20.3, R29.3, R26.2, R26.81  Onset Date: 2020                                  Next 's appt: unknown    Visit# / total visits: 6/10      Cancels/No Shows: 1/0    Subjective:    Pain:  [x] Yes  [] No Location: R thigh/LB   Pain Rating: (0-10 scale) 5/10  Pain altered Tx:  [x] No  [] Yes  Action:  Comments: Patient arrives this date using cane, says he isn't using it for pain its for balance supports because he has been feeling \"unsteady\". Says his pain is 5/10 and he believes it's from the weather. States he has been keeping up with HEP and it's been helping.    Objective:  Modalities:   Precautions:  Exercises:  Exercise Reps/ Time Weight/ Level Comments   Nustep 5' 2 Held 2/4 due to availability          Standing       Calf stretch 20\"x3     Heel raises  x20  Added foam 2/4   3 way hip  15x ea  On foam -- Increased reps    SLS  20\"x2 ea  On foam    HS stretch  30\"x2 [x] Patient would continue to benefit from skilled physical therapy services in order to: decrease pain, increase trunk and LE strength and hip, and lumbar ROM, and increase functional mobility, including increase tolerance to activity.             STG: (to be met in 10 treatments)  1. ? Pain: LB, R thigh 3/10 average pain, 6/10 at worst  2. ? ROM: R hip flex 105°, abd 30°; Lumbar flex 62°, Ext 16°  3. ? Strength: B hips, 4-/5; R knee 4-/5, L knee 4/5, R DF 4/5  4. ? Balance: Tinetti score 25 indicating low risk of falls  5. ? Function: LEFS 60% loss of LE function  6. Pt report increased tolerance to household activities, increase duration able to perform each activity without increased pain  7. Patient to be independent with home exercise program as demonstrated by performance with correct form without cues.       Patient goals: \"To control/manage pain\"    Pt. Education:  [x] Yes  [] No  [] Reviewed Prior HEP/Ed  Method of Education: [x] Verbal  [x] Demo  [] Written  Comprehension of Education:  [x] Verbalizes understanding. [x] Demonstrates understanding. [x] Needs review. [] Demonstrates/verbalizes HEP/Ed previously given. HEP 1/28 Standing hip abduction, hip extension, hip flexion, clamshells, hip abduction, LAQ, hamstring stretch. Plan: [x] Continue current frequency toward long and short term goals. [x] Specific Instructions for subsequent treatments: progress standing as tolerated. Time In: 10:00am           Time Out: 10:36am    Electronically signed by:  Mei Kelly    Patient treated and note written by Mei Kelly, Student Physical Therapist Assistant under supervision of Hernandez Kinsey PTA.

## 2021-02-09 ENCOUNTER — HOSPITAL ENCOUNTER (OUTPATIENT)
Dept: PHYSICAL THERAPY | Age: 51
Setting detail: THERAPIES SERIES
Discharge: HOME | End: 2021-02-09
Payer: MEDICARE

## 2021-02-09 PROCEDURE — 97110 THERAPEUTIC EXERCISES: CPT

## 2021-02-09 NOTE — FLOWSHEET NOTE
HS stretch  20\"x3  With nerve flossing, DF/PF throughout  x   Bridges 15x    x   Hip Abduction  2x10 Blue    x   Hip Adduction 3\"x20 with ball  x   Sciatic N. Glide  x15  Hip/knee flexed to 90 degrees -- knee ext as far as tolerated, return to 90 degrees. -- held 2/9, pain Discharge next tx. LTR 5x5\" ea   x   SLR    Next            Sidelying:       Hip abduction  2x10 ea 2 lbs   x   Clams  2x10 ea Blue   x          Seated        LAQ x20 2 lbs   x   Heel/Toe raises  x20   x   HS stretch  30\"x2  Modified to standing                          Other:      Treatment Charges: Mins Units   []  Modalities     [x]  Ther Exercise 42 3   []  Manual Therapy     []  Ther Activities     []  Aquatics     []  Vasocompression     []  Other     Total Treatment time 42 3       Assessment: [x] Progressing toward goals. Increased step height and reps to exercises noted above with good tolerance. Patient needed MIN verbal cues with squats to correct technique with good understanding and carryover. Again, attempted sciatic N. [place] this date thus will discharge at this time due to continued pain. Patient notes increased pain in thigh with motion thus held this date as documented. Patient with increased fatigue throughout supine exercises however, no increased pain or discomfort noted. Will continue to progress as tolerates. [] No change.      [] Other:  [x] Patient would continue to benefit from skilled physical therapy services in order to: decrease pain, increase trunk and LE strength and hip, and lumbar ROM, and increase functional mobility, including increase tolerance to activity.             STG: (to be met in 10 treatments)  1. ? Pain: LB, R thigh 3/10 average pain, 6/10 at worst  2. ? ROM: R hip flex 105°, abd 30°; Lumbar flex 62°, Ext 16°  3. ? Strength: B hips, 4-/5; R knee 4-/5, L knee 4/5, R DF 4/5  4. ? Balance: Tinetti score 25 indicating low risk of falls  5. ? Function: LEFS 60% loss of LE function 6. Pt report increased tolerance to household activities, increase duration able to perform each activity without increased pain  7. Patient to be independent with home exercise program as demonstrated by performance with correct form without cues.       Patient goals: \"To control/manage pain\"    Pt. Education:  [x] Yes  [] No  [] Reviewed Prior HEP/Ed  Method of Education: [x] Verbal  [x] Demo  [] Written  Comprehension of Education:  [x] Verbalizes understanding. [x] Demonstrates understanding. [x] Needs review. [] Demonstrates/verbalizes HEP/Ed previously given. HEP 1/28 Standing hip abduction, hip extension, hip flexion, clamshells, hip abduction, LAQ, hamstring stretch. Plan: [x] Continue current frequency toward long and short term goals. [x] Specific Instructions for subsequent treatments: progress standing as tolerated. SLR       Time In: 10:00am           Time Out: 10:42am    Electronically signed by:  Yun Sanches    Patient treated and note written by Yun Sanches, Student Physical Therapist Assistant under supervision of Marco A Peace PTA.

## 2021-02-11 ENCOUNTER — HOSPITAL ENCOUNTER (OUTPATIENT)
Dept: PHYSICAL THERAPY | Age: 51
Setting detail: THERAPIES SERIES
Discharge: HOME | End: 2021-02-11
Payer: MEDICARE

## 2021-02-11 NOTE — FLOWSHEET NOTE
[x] [facility]) [facility] &  Therapy  [address]    [phone]  F: [phone]   [] [address]  [facility]   [address]  P: [phone]  F: [phone]  [] [place]  [address]  P: [phone]  F: [phone] [] [address]  P: [phone]  F: [phone]  [] [address]  [facility]   [address]   [place]: [phone]  F: [phone]   [] [facility]  [address]  [place]: [phone]   F: [phone]     Physical Therapy Cancel/No Show note    Date: 2021  Patient: Sharmaine Thompson  : 1970  MRN: [id number]    Cancels/No Shows to date:     For today's appointment patient:    [x]  Cancelled    [] Rescheduled appointment    [] No-show     Reason given by patient:    []  Patient ill    []  Conflicting appointment    [] No transportation      [] Conflict with work    [] No reason given    [] Weather related    [] COVID-19    [x] Other:      Comments: Patient called and left voicemail for clinic stating he is not able to make today's appt. Called patient back and scheduled appts for next week.         [x] Next appointment was confirmed    Electronically signed by: Nataly Leyva PTA

## 2021-02-12 ENCOUNTER — TELEPHONE (OUTPATIENT)
Dept: FAMILY MEDICINE CLINIC | Age: 51
End: 2021-02-12

## 2021-02-12 DIAGNOSIS — Z12.11 ENCOUNTER FOR COLORECTAL CANCER SCREENING: ICD-10-CM

## 2021-02-12 DIAGNOSIS — Z12.12 ENCOUNTER FOR COLORECTAL CANCER SCREENING: ICD-10-CM

## 2021-02-16 ENCOUNTER — APPOINTMENT (OUTPATIENT)
Dept: PHYSICAL THERAPY | Age: 51
End: 2021-02-16
Payer: MEDICARE

## 2021-02-18 ENCOUNTER — HOSPITAL ENCOUNTER (OUTPATIENT)
Dept: PHYSICAL THERAPY | Age: 51
Setting detail: THERAPIES SERIES
Discharge: HOME | End: 2021-02-18
Payer: MEDICARE

## 2021-02-18 ENCOUNTER — HOSPITAL ENCOUNTER (OUTPATIENT)
Dept: PHYSICAL THERAPY | Age: 51
Setting detail: THERAPIES SERIES
End: 2021-02-18
Payer: MEDICARE

## 2021-02-18 NOTE — FLOWSHEET NOTE
[x] [facility]) [facility] &  Therapy  [address]    [phone]  F: [phone]   [] [address] [facility] [address]   [address]  P: [phone]  F: [phone]  [] [address] [facility] &  Therapy  [address] [facility] [facility]  P: [phone]  F: [phone] [] [address] [facility]  P: [phone]  F: [phone]  [] [address]  [facility]   [address]   [place]: [phone]  F: [phone]   [] [address]  [address]  [place]: [phone]   F: [phone]     Physical Therapy Cancel/No Show note    Date: 2021  Patient: Radhika Katz  : 1970  MRN: [id number]    Cancels/No Shows to date:     For today's appointment patient:    [x]  Cancelled    [] Rescheduled appointment    [] No-show     Reason given by patient:    []  Patient ill    []  Conflicting appointment    [] No transportation      [] Conflict with work    [] No reason given    [x] Weather related    [] COVID-19    [x] Other:      Comments: Patient was called and scheduled for next week.          [x] Next appointment was confirmed    Electronically signed by: Vladimir Reilly PTA

## 2021-02-23 ENCOUNTER — HOSPITAL ENCOUNTER (OUTPATIENT)
Dept: PHYSICAL THERAPY | Age: 51
Setting detail: THERAPIES SERIES
Discharge: HOME | End: 2021-02-23
Payer: MEDICARE

## 2021-02-23 PROCEDURE — 97110 THERAPEUTIC EXERCISES: CPT

## 2021-02-23 NOTE — PROGRESS NOTES
[x] [facility] &  Therapy  [address]  [phone]  F: [phone]         Physical Therapy Progress Note    Date: 2021      Patient: Zan Forbes  : 1970  MRN: [id number]    Physician:  Kiarra Stephenson                              Insurance: [place] Advantage (limit 30 Rx)  Medical Diagnosis: Numbness, tingling R LE R20.0, R20.2, LBP M54.5                 Rehab Codes: M79.651, M25.551, M54.5, M25.651, R20.2, R20.3, R29.3, R26.2, R26.81  Onset Date: 2020                                  Next 's appt: unknown     Total visits attended: 8  Cancels/No shows:2/0  Date range of services: 2021 to 2021      Subjective:  Pain:  [x] Yes  [] No  Location: R thigh/LB  Pain Rating: (0-10 scale) 6/10  Pain altered Tx:  [x] No  [] Yes  Action:  Comments: Pt reports pain at worst 6-7/10. Pt reports household activities are getting a little better than they were before but he still has to pace himself. Pt reports cont to use cane when goes out, still gets dizzyness. Still needs to hold onto something, hasn't exerted self to know if balance is improving. Objective:  Test Measurements:   ROM DEGREES A/P STRENGTH  STRENGTH     RIGHT LEFT RIGHT   HIP FLEX 112°p 4-p 4p   HIPEXT  3+* 3+*   HIP ER      HIP IR      HIP ABD 26°p 4p 4-p   HIP ADD            KNEE FLEX  4- 4-   KNEE EXT  4 4p         ANKLE DF   4-         Lumbar Flex 49°p \"strain\"     Lumbar Ext  8°p     p=pain  *=discomfort  Function: LEFS 59% loss of LE function.   Tinetti 28/28 indicating low risk of falls    Assessment:  STG:(to be met in 10 treatments)  1. ? Pain: LB, R thigh 3/10 average pain, 6/10 at worst-Progress Toward  2. ? ROM: R hip flex 105°, abd 30°; Lumbar flex 62°, Ext 16°-Progress Toward  3. ? Strength: B hips, 4-/5; R knee 4-/5, L knee 4/5, R DF 4/5-Partially Met  4. ? Balance: Tinetti score 25 indicating low risk of falls-Met 5. ? Function: LEFS 60% loss of LE function-Met  6. Pt report increased tolerance to household activities, increase duration able to perform each activity without increased pain-Progress Toward  7. Patient to be independent with home exercise program as demonstrated by performance with correct form without cues-Progress Toward         Treatment Plan:  [x] Therapeutic Exercise   27538  [] Iontophoresis: 4 mg/mL Dexamethasone Sodium Phosphate  mAmin  38727   [] Therapeutic Activity  50510 [] Vasopneumatic cold with compression  89256    [] Gait Training   91046 [] Ultrasound   20347   [] Neuromuscular Re-education  67106 [] Electrical Stimulation Unattended  98177   [] Manual Therapy  92908 [] Electrical Stimulation Attended  32854   [] Instruction in HEP  [] Lumbar/Cervical Traction  53953   [] Aquatic Therapy   58478 [] Cold/hotpack    [] Massage   16512      [] Dry Needling, 1 or 2 muscles  02723   [] Biofeedback, first 15 minutes   64811  [] Biofeedback, additional 15 minutes   43450 [] Dry Needling, 3 or more muscles  69138       Patient Status:     [x] Continue per initial plan of care. [] Additional visits necessary. [x] Other: Cont x 2 remaining Rx, then discharge to [facility]      Electronically signed by Negra Mcqueen, PT on 2/23/2021 at 10:19 AM        If you have any questions or concerns, please don't hesitate to call.   Thank you for your referral.

## 2021-02-23 NOTE — PROGRESS NOTES
TINETTI BALANCE ASSESSMENT TOOL    Patient name:  Rachid Nicholas     Date:  2/23/2021  Completed by:  [facility]    Patient is seated in hard, armless chair;  Sitting Balance     Score: [] 0 [x] 1  Leans or slides in chair = 0     Steady, safe = 1    Rises from chair     Score: [] 0 [] 1 [x] 2  Unable to without help = 0  Able, uses arms to help = 1  Able without use of arms = 2    Attempts to rise     Score: [] 0 [] 1 [x] 2  Unable to without help = 0  Able, requires > 1 attempt = 1  Able to rise, 1 attempt = 2    Immediate standing Balance (first 5 seconds) Score: [] 0 [] 1 [x] 2  Unsteady (staggers, moves feet, trunk sway) = 0  Steady but uses walker or other support = 1  Steady without walker or other support = 2    Standing balance     Score: [] 0 [] 1 [x] 2  Unsteady = 0  Steady but wide stance and uses support = 1  Narrow stance without support = 2    Nudged (sternal nudge)    Score: [] 0 [] 1 [x]   2  Begins to fall = 0  Staggers, grabs, catches self = 1  Steady = 2    Eyes closed      Score: [] 0 [x] 1  Unsteady = 0  Steady = 1    Turning 360 degrees    Score: [] 0 [x] 1  (A)  (A) Discontinuous steps = 0      (A) Continuous = 1  (B) Unsteady= 0     Score: [] 0 [x] 1  (B)  (B) Steady = 1    Sitting down     Score: [] 0 [] 1 [x] 2  Unsafe (misjudged distance, falls into chair) = 0  Uses arms or not a smooth motion = 1  Safe, smooth motion = 2    Balance Total Score    Score:    16 /16            GAIT SECTION    Patient stands with therapist, walks across room (+/- aids), first at usual pace, then at rapid pace.   Indication of gait (Immediately after told to [facility].) Score: [] 0 [x] 1  Any hesitancy or multiple attempts = 0  No hesitancy = 1    Step length and height    Score: [] 0 [x] L    [x] R  Step to = 0  Step through R = 1  Step through L = 1    Foot clearance     Score: [] 0 [x] L     [x] R  Foot drop = 0  L foot clears floor = 1  R foot clears floor = 1 Step symmetry     Score: [] 0 [x] 1  Right and left step length not equal = 0  Right and left step length appear equal = 1    Step continuity     Score: [] 0 [x] 1  Stopping or discontinuity between steps = 0  Steps appear continuous = 1    Path (Excursion)     Score: [] 0 [] 1 [x] 2  Marked deviation = 0  Mild/moderate deviation or uses w. aid = 1  Straight without w. aid = 2    Trunk       Score: [] 0 [] 1 [x] 2  Marked sway or uses w. aid = 0  No sway but flex. knees or back or  uses arms for stability = 1  No sway, flex. , use of arms or w. aid = 2    Walking time     Score: [] 0 [x] 1  Heels apart = 0  Heels almost touching while walking = 1    Gait Total Score     Score:        12 /12              TOTAL SCORE = BALANCE + GAIT  Score:     28 /28         Risk Indicators:   Score 18 or less = High risk of falls  Score 19-23 = Moderate risk of falls  Score 24 or more = Low risk of falls    Yvette ME, Marco TF, Jasper R, Fall Risk Index for elderly patients based on number of chronic disabilities.   Am J Med [id number]

## 2021-02-23 NOTE — FLOWSHEET NOTE
[x] [facility]) [facility] &  Therapy  [address]  [phone]  F: [phone] [] [address]  [facility]   [address]  P: [phone]  F: [phone] [] [address] &  Therapy  [address] [facility]  P: [phone]  F: [phone] [] [address]  P: [phone]  F: [phone] [] [address]  [facility]   [address]   [place]: [phone]  F: [phone]      Physical Therapy Daily Treatment Note    Date:  2021  Patient Name:  Emilee Cabrales    :  1970  MRN: [id number]  Physician:  Perfecto Stephenson                              Insurance: [place] Advantage (limit 30 Rx)  Medical Diagnosis: Numbness, tingling R LE R20.0, R20.2, LBP M54.5                 Rehab Codes: M79.651, M25.551, M54.5, M25.651, R20.2, R20.3, R29.3, R26.2, R26.81  Onset Date: 2020                                  Next 's appt: unknown    Visit# / total visits: 8/10      Cancels/No Shows: 3/0    Subjective:    Pain:  [x] Yes  [] No Location: R thigh/LB   Pain Rating: (0-10 scale) 6/10  Pain altered Tx:  [x] No  [] Yes  Action:  Comments: Patient complains of 6/10 overall pain upon arrival. Says his increased pain is from shoveling snow over the past few days.    Objective:  Modalities:   Precautions:  Exercises:  Exercise Reps/ Time Weight/ Level Comments    Nustep 5' 2  x          Standing        Calf stretch 20\"x3   x   Heel raises  x20   x   3 way hip  15x ea  On foam  x   SLS  20\"x2 ea  On foam  x   HS stretch  30\"x2   x   Step ups  15x ea 8\"  x   Mini squats  15x ea   x          Supine        trA iso  3\"x15      Marches  2x10 3 lbs  Verbal cues for core stabilization -- Increased weight  x 6. Pt report increased tolerance to household activities, increase duration able to perform each activity without increased pain  7. Patient to be independent with home exercise program as demonstrated by performance with correct form without cues.       Patient goals: \"To control/manage pain\"    Pt. Education:  [x] Yes  [] No  [] Reviewed Prior HEP/Ed  Method of Education: [x] Verbal  [x] Demo  [] Written  Comprehension of Education:  [x] Verbalizes understanding. [x] Demonstrates understanding. [] Needs review. [] Demonstrates/verbalizes HEP/Ed previously given. HEP 1/28 Standing hip abduction, hip extension, hip flexion, clamshells, hip abduction, LAQ, hamstring stretch. Plan: [x] Continue current frequency toward long and short term goals. [x] Specific Instructions for subsequent treatments: Progress standing, increase weight as patient is able to tolerate. Time In: 10:05am           Time Out: 10:53 am    Electronically signed by:  Ade Gay    Patient treated and note written by Ade Gay, Student Physical Therapist Assistant under supervision of Yesy Moran PTA.

## 2021-02-24 ENCOUNTER — OFFICE VISIT (OUTPATIENT)
Dept: NEUROLOGY | Age: 51
End: 2021-02-24
Payer: MEDICARE

## 2021-02-24 VITALS
TEMPERATURE: 97.7 F | BODY MASS INDEX: 30.51 KG/M2 | HEART RATE: 89 BPM | DIASTOLIC BLOOD PRESSURE: 73 MMHG | WEIGHT: 206 LBS | SYSTOLIC BLOOD PRESSURE: 131 MMHG | HEIGHT: 69 IN

## 2021-02-24 DIAGNOSIS — M54.40 CHRONIC LOW BACK PAIN WITH SCIATICA, SCIATICA LATERALITY UNSPECIFIED, UNSPECIFIED BACK PAIN LATERALITY: ICD-10-CM

## 2021-02-24 DIAGNOSIS — G89.29 CHRONIC LOW BACK PAIN WITH SCIATICA, SCIATICA LATERALITY UNSPECIFIED, UNSPECIFIED BACK PAIN LATERALITY: ICD-10-CM

## 2021-02-24 DIAGNOSIS — G89.29 CHRONIC NECK PAIN: Primary | ICD-10-CM

## 2021-02-24 DIAGNOSIS — M54.2 CHRONIC NECK PAIN: Primary | ICD-10-CM

## 2021-02-24 DIAGNOSIS — R41.3 MEMORY LOSS: ICD-10-CM

## 2021-02-24 PROCEDURE — 99204 OFFICE O/P NEW MOD 45 MIN: CPT | Performed by: PSYCHIATRY & NEUROLOGY

## 2021-02-24 PROCEDURE — G8417 CALC BMI ABV UP PARAM F/U: HCPCS | Performed by: PSYCHIATRY & NEUROLOGY

## 2021-02-24 PROCEDURE — 3017F COLORECTAL CA SCREEN DOC REV: CPT | Performed by: PSYCHIATRY & NEUROLOGY

## 2021-02-24 PROCEDURE — G8427 DOCREV CUR MEDS BY ELIG CLIN: HCPCS | Performed by: PSYCHIATRY & NEUROLOGY

## 2021-02-24 PROCEDURE — 1036F TOBACCO NON-USER: CPT | Performed by: PSYCHIATRY & NEUROLOGY

## 2021-02-24 PROCEDURE — G8482 FLU IMMUNIZE ORDER/ADMIN: HCPCS | Performed by: PSYCHIATRY & NEUROLOGY

## 2021-02-24 RX ORDER — GABAPENTIN 300 MG/1
CAPSULE ORAL
Qty: 60 CAPSULE | Refills: 2 | Status: SHIPPED | OUTPATIENT
Start: 2021-02-24 | End: 2021-07-27

## 2021-02-24 ASSESSMENT — ENCOUNTER SYMPTOMS
BACK PAIN: 1
COUGH: 1
GASTROINTESTINAL NEGATIVE: 1
ALLERGIC/IMMUNOLOGIC NEGATIVE: 1
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
## 2021-02-25 ENCOUNTER — HOSPITAL ENCOUNTER (OUTPATIENT)
Dept: PHYSICAL THERAPY | Age: 51
Setting detail: THERAPIES SERIES
Discharge: HOME | End: 2021-02-25
Payer: MEDICARE

## 2021-02-25 NOTE — FLOWSHEET NOTE
[x] [facility]) [facility] &  Therapy  [address]    [phone]  F: [phone]   [] [address]  [facility]   [address]  P: [phone]  F: [phone]  [] [place]  [address]  P: [phone]  F: [phone] [] [address]  P: [phone]  F: [phone]  [] [address]  [facility]   [address]   [place]: [phone]  F: [phone]   [] [facility]  [address] [facility] [address]  [place]: [phone]   F: [phone]     Physical Therapy Cancel/No Show note    Date: 2021  Patient: Hillary Ellsworth  : 1970  MRN: [id number]    Cancels/No Shows to date: 3/0    For today's appointment patient:    [x]  Cancelled    [] Rescheduled appointment    [] No-show     Reason given by patient:    []  Patient ill    []  Conflicting appointment    [] No transportation      [] Conflict with work    [] No reason given    [] Weather related    [] COVID-19    [x] Other:  Called patient about missed appointment this date, patient states that he left message on inpatient phone number cancelling as he had an issue with an aunt. Scheduled and confirmed appointments for next week.     Comments:         [x] Next appointment was confirmed/ scheduled for Thursday 3/4 at 11 am    Electronically signed by: Kayleigh Clark PTA

## 2021-03-04 ENCOUNTER — HOSPITAL ENCOUNTER (OUTPATIENT)
Dept: PHYSICAL THERAPY | Age: 51
Setting detail: THERAPIES SERIES
Discharge: HOME | End: 2021-03-04
Payer: MEDICARE

## 2021-03-11 ENCOUNTER — HOSPITAL ENCOUNTER (OUTPATIENT)
Dept: PHYSICAL THERAPY | Age: 51
Setting detail: THERAPIES SERIES
Discharge: HOME | End: 2021-03-11
Payer: MEDICARE

## 2021-03-11 NOTE — FLOWSHEET NOTE
[x] [facility]) - [facility] &  Therapy  [address]    [phone]  F: [phone]   [] [address]  [facility]   [address]  P: [phone]  F: [phone]  [] [address] &  Therapy  [address]  P: [phone]  F: [phone] [] [address]  P: [phone]  F: [phone]  [] [address]  [facility]   [address]   [place]: [phone]  F: [phone]   [] [place] [address]  [place]: [phone]   F: [phone]     Physical Therapy Cancel/No Show note    Date: 3/11/2021  Patient: Radhika Katz  : 1970  MRN: [id number]    Cancels/No Shows to date:     For today's appointment patient:    [x]  Cancelled    [] Rescheduled appointment    [] No-show     Reason given by patient:    []  Patient ill    []  Conflicting appointment    [] No transportation      [] Conflict with work    [] No reason given    [] Weather related    [] COVID-19    [x] Other:     Comments: Patient cancelled this date due to having to take care of sick aunt. Will call at later date to reschedule. [] Next appointment was confirmed    Electronically signed by: Malka MENESESA  Patient treated and note written by Malka Rodriguez, Student Physical Therapist Assistant under supervision of Jay Anderson PTA.

## 2021-03-24 ENCOUNTER — INITIAL CONSULT (OUTPATIENT)
Dept: PAIN MANAGEMENT | Age: 51
End: 2021-03-24
Payer: MEDICARE

## 2021-03-24 VITALS
SYSTOLIC BLOOD PRESSURE: 125 MMHG | OXYGEN SATURATION: 98 % | HEIGHT: 69 IN | DIASTOLIC BLOOD PRESSURE: 76 MMHG | HEART RATE: 89 BPM | BODY MASS INDEX: 30.51 KG/M2 | WEIGHT: 206 LBS

## 2021-03-24 DIAGNOSIS — G89.29 CHRONIC BILATERAL LOW BACK PAIN WITH RIGHT-SIDED SCIATICA: ICD-10-CM

## 2021-03-24 DIAGNOSIS — Z98.1 HISTORY OF FUSION OF CERVICAL SPINE: ICD-10-CM

## 2021-03-24 DIAGNOSIS — M54.41 CHRONIC BILATERAL LOW BACK PAIN WITH RIGHT-SIDED SCIATICA: ICD-10-CM

## 2021-03-24 DIAGNOSIS — Z87.828 HISTORY OF MOTOR VEHICLE ACCIDENT: ICD-10-CM

## 2021-03-24 DIAGNOSIS — G89.29 CHRONIC NECK PAIN: Primary | ICD-10-CM

## 2021-03-24 DIAGNOSIS — R20.0 RIGHT LEG NUMBNESS: ICD-10-CM

## 2021-03-24 DIAGNOSIS — M54.2 CHRONIC NECK PAIN: Primary | ICD-10-CM

## 2021-03-24 PROCEDURE — G8417 CALC BMI ABV UP PARAM F/U: HCPCS | Performed by: ANESTHESIOLOGY

## 2021-03-24 PROCEDURE — G8427 DOCREV CUR MEDS BY ELIG CLIN: HCPCS | Performed by: ANESTHESIOLOGY

## 2021-03-24 PROCEDURE — G8482 FLU IMMUNIZE ORDER/ADMIN: HCPCS | Performed by: ANESTHESIOLOGY

## 2021-03-24 PROCEDURE — 99244 OFF/OP CNSLTJ NEW/EST MOD 40: CPT | Performed by: ANESTHESIOLOGY

## 2021-03-24 ASSESSMENT — ENCOUNTER SYMPTOMS
SHORTNESS OF BREATH: 1
EYE PAIN: 0
BACK PAIN: 1
CONSTIPATION: 0
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
## 2021-03-31 ENCOUNTER — HOSPITAL ENCOUNTER (OUTPATIENT)
Dept: MRI IMAGING | Age: 51
Discharge: HOME | End: 2021-04-02
Payer: MEDICARE

## 2021-03-31 DIAGNOSIS — M54.40 CHRONIC LOW BACK PAIN WITH SCIATICA, SCIATICA LATERALITY UNSPECIFIED, UNSPECIFIED BACK PAIN LATERALITY: ICD-10-CM

## 2021-03-31 DIAGNOSIS — G89.29 CHRONIC LOW BACK PAIN WITH SCIATICA, SCIATICA LATERALITY UNSPECIFIED, UNSPECIFIED BACK PAIN LATERALITY: ICD-10-CM

## 2021-03-31 DIAGNOSIS — R41.3 MEMORY LOSS: ICD-10-CM

## 2021-03-31 PROCEDURE — 72148 MRI LUMBAR SPINE W/O DYE: CPT

## 2021-03-31 PROCEDURE — 70551 MRI BRAIN STEM W/O DYE: CPT

## 2021-04-01 ENCOUNTER — TELEPHONE (OUTPATIENT)
Dept: PAIN MANAGEMENT | Age: 51
End: 2021-04-01

## 2021-04-01 NOTE — DISCHARGE SUMMARY
[] [facility]) [facility] &  Therapy  [address]  [phone]  F: [phone] [] [address] [facility]   [address]  P: [phone]  F: [phone] [] [address] &  Therapy  [address] [facility]  P: [phone]  F: [phone] [] [address]  P: [phone]  F: [phone] [] [address]  [facility]   [address]   [place]: [phone]  F: [phone]      Physical Therapy Discharge Note    Date: 2021      Patient: John Cochran  : 1970  MRN: [id number]    Physician[facility]  [facility]                              Insurance: [employer] (limit 30 Rx)  Medical Diagnosis: Numbness, tingling R LE R20.0, R20.2, LBP M54.5                 Rehab Codes: M79.651, M25.551, M54.5, M25.651, R20.2, R20.3, R29.3, R26.2, R26.81  Onset Date: 2020                                  Next 's appt: unknown      Total visits attended: 8  Cancels/No shows: 5/0  Date of initial visit: 2021                Date of final visit: 2021      Subjective:  Pain:  [x] Yes  [] No  Location: R thigh, LBP Pain Rating: (0-10 scale) 6/10 at final rx  Pain altered Tx:  [x] No  [] Yes  Action:  Comments:  Patient cancelled 3/11/2021 due to having to take care of sick aunt. Pt cancelled last 3 scheduled appts. Pt was to call at later date to reschedule. No further contact with Pt since that time, will discharge per attendance policy.         Objective:   Test Measurements:   2021 ROM DEGREES A/P STRENGTH  STRENGTH      RIGHT LEFT RIGHT   HIP FLEX 112°p 4-p 4p   HIPEXT   3+* 3+*   HIP ER         HIP IR         HIP ABD 26°p 4p 4-p   HIP ADD                   KNEE FLEX   4- 4-   KNEE EXT   4 4p             ANKLE DF

## 2021-04-19 ENCOUNTER — APPOINTMENT (OUTPATIENT)
Dept: GENERAL RADIOLOGY | Age: 51
End: 2021-04-19
Attending: ANESTHESIOLOGY
Payer: MEDICARE

## 2021-04-19 ENCOUNTER — HOSPITAL ENCOUNTER (OUTPATIENT)
Age: 51
Setting detail: OUTPATIENT SURGERY
Discharge: HOME | End: 2021-04-19
Attending: ANESTHESIOLOGY | Admitting: ANESTHESIOLOGY
Payer: MEDICARE

## 2021-04-19 VITALS
HEIGHT: 68 IN | RESPIRATION RATE: 16 BRPM | OXYGEN SATURATION: 98 % | DIASTOLIC BLOOD PRESSURE: 81 MMHG | BODY MASS INDEX: 30.49 KG/M2 | TEMPERATURE: 97.6 F | SYSTOLIC BLOOD PRESSURE: 109 MMHG | WEIGHT: 201.2 LBS | HEART RATE: 79 BPM

## 2021-04-19 PROBLEM — M54.2 CHRONIC NECK PAIN: Chronic | Status: ACTIVE | Noted: 2021-03-24

## 2021-04-19 PROBLEM — G89.29 CHRONIC NECK PAIN: Chronic | Status: ACTIVE | Noted: 2021-03-24

## 2021-04-19 PROCEDURE — 2500000003 HC RX 250 WO HCPCS: Performed by: ANESTHESIOLOGY

## 2021-04-19 PROCEDURE — 64492 INJ PARAVERT F JNT C/T 3 LEV: CPT | Performed by: ANESTHESIOLOGY

## 2021-04-19 PROCEDURE — 64491 INJ PARAVERT F JNT C/T 2 LEV: CPT | Performed by: ANESTHESIOLOGY

## 2021-04-19 PROCEDURE — 7100000010 HC PHASE II RECOVERY - FIRST 15 MIN: Performed by: ANESTHESIOLOGY

## 2021-04-19 PROCEDURE — 64490 INJ PARAVERT F JNT C/T 1 LEV: CPT | Performed by: ANESTHESIOLOGY

## 2021-04-19 PROCEDURE — 6360000004 HC RX CONTRAST MEDICATION: Performed by: ANESTHESIOLOGY

## 2021-04-19 PROCEDURE — 2709999900 HC NON-CHARGEABLE SUPPLY: Performed by: ANESTHESIOLOGY

## 2021-04-19 PROCEDURE — 6360000002 HC RX W HCPCS: Performed by: ANESTHESIOLOGY

## 2021-04-19 PROCEDURE — 3209999900 FLUORO FOR SURGICAL PROCEDURES

## 2021-04-19 PROCEDURE — 3600000050 HC PAIN LEVEL 1 BASE: Performed by: ANESTHESIOLOGY

## 2021-04-19 PROCEDURE — 7100000011 HC PHASE II RECOVERY - ADDTL 15 MIN: Performed by: ANESTHESIOLOGY

## 2021-04-19 RX ORDER — SODIUM CHLORIDE 9 MG/ML
25 INJECTION, SOLUTION INTRAVENOUS PRN
Status: DISCONTINUED | OUTPATIENT
Start: 2021-04-19 | End: 2021-04-19 | Stop reason: HOSPADM

## 2021-04-19 RX ORDER — DEXAMETHASONE SODIUM PHOSPHATE 10 MG/ML
INJECTION INTRAMUSCULAR; INTRAVENOUS PRN
Status: DISCONTINUED | OUTPATIENT
Start: 2021-04-19 | End: 2021-04-19 | Stop reason: ALTCHOICE

## 2021-04-19 RX ORDER — SODIUM CHLORIDE 0.9 % (FLUSH) 0.9 %
5-40 SYRINGE (ML) INJECTION EVERY 12 HOURS SCHEDULED
Status: DISCONTINUED | OUTPATIENT
Start: 2021-04-19 | End: 2021-04-19 | Stop reason: HOSPADM

## 2021-04-19 RX ORDER — SODIUM CHLORIDE 0.9 % (FLUSH) 0.9 %
5-40 SYRINGE (ML) INJECTION PRN
Status: DISCONTINUED | OUTPATIENT
Start: 2021-04-19 | End: 2021-04-19 | Stop reason: HOSPADM

## 2021-04-19 RX ORDER — BUPIVACAINE HYDROCHLORIDE 5 MG/ML
INJECTION, SOLUTION EPIDURAL; INTRACAUDAL PRN
Status: DISCONTINUED | OUTPATIENT
Start: 2021-04-19 | End: 2021-04-19 | Stop reason: ALTCHOICE

## 2021-04-19 ASSESSMENT — PAIN - FUNCTIONAL ASSESSMENT: PAIN_FUNCTIONAL_ASSESSMENT: 0-10

## 2021-04-19 ASSESSMENT — PAIN SCALES - GENERAL: PAINLEVEL_OUTOF10: 2

## 2021-04-19 ASSESSMENT — PAIN DESCRIPTION - PAIN TYPE: TYPE: CHRONIC PAIN

## 2021-04-19 ASSESSMENT — PAIN DESCRIPTION - DESCRIPTORS: DESCRIPTORS: ACHING;BURNING
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
## 2021-05-18 ENCOUNTER — OFFICE VISIT (OUTPATIENT)
Dept: PAIN MANAGEMENT | Age: 51
End: 2021-05-18
Payer: MEDICARE

## 2021-05-18 VITALS
HEIGHT: 68 IN | BODY MASS INDEX: 30.59 KG/M2 | HEART RATE: 101 BPM | RESPIRATION RATE: 16 BRPM | SYSTOLIC BLOOD PRESSURE: 120 MMHG | DIASTOLIC BLOOD PRESSURE: 70 MMHG | OXYGEN SATURATION: 97 %

## 2021-05-18 DIAGNOSIS — M51.26 LUMBAR DISC HERNIATION: ICD-10-CM

## 2021-05-18 DIAGNOSIS — G89.29 CHRONIC NECK PAIN: Primary | Chronic | ICD-10-CM

## 2021-05-18 DIAGNOSIS — Z87.828 HISTORY OF MOTOR VEHICLE ACCIDENT: ICD-10-CM

## 2021-05-18 DIAGNOSIS — M54.2 CHRONIC NECK PAIN: Primary | Chronic | ICD-10-CM

## 2021-05-18 DIAGNOSIS — Z98.1 HISTORY OF FUSION OF CERVICAL SPINE: ICD-10-CM

## 2021-05-18 PROCEDURE — 99214 OFFICE O/P EST MOD 30 MIN: CPT | Performed by: ANESTHESIOLOGY

## 2021-05-18 PROCEDURE — G8427 DOCREV CUR MEDS BY ELIG CLIN: HCPCS | Performed by: ANESTHESIOLOGY

## 2021-05-18 PROCEDURE — 1036F TOBACCO NON-USER: CPT | Performed by: ANESTHESIOLOGY

## 2021-05-18 PROCEDURE — 3017F COLORECTAL CA SCREEN DOC REV: CPT | Performed by: ANESTHESIOLOGY

## 2021-05-18 PROCEDURE — G8417 CALC BMI ABV UP PARAM F/U: HCPCS | Performed by: ANESTHESIOLOGY

## 2021-05-18 ASSESSMENT — ENCOUNTER SYMPTOMS: BACK PAIN: 1
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
## 2021-06-14 ENCOUNTER — APPOINTMENT (OUTPATIENT)
Dept: GENERAL RADIOLOGY | Age: 51
End: 2021-06-14
Attending: ANESTHESIOLOGY
Payer: MEDICARE

## 2021-06-14 ENCOUNTER — HOSPITAL ENCOUNTER (OUTPATIENT)
Age: 51
Setting detail: OUTPATIENT SURGERY
Discharge: HOME | End: 2021-06-14
Attending: ANESTHESIOLOGY | Admitting: ANESTHESIOLOGY
Payer: MEDICARE

## 2021-06-14 VITALS
TEMPERATURE: 98.5 F | WEIGHT: 204 LBS | HEIGHT: 69 IN | BODY MASS INDEX: 30.21 KG/M2 | RESPIRATION RATE: 14 BRPM | HEART RATE: 75 BPM | SYSTOLIC BLOOD PRESSURE: 130 MMHG | OXYGEN SATURATION: 98 % | DIASTOLIC BLOOD PRESSURE: 70 MMHG

## 2021-06-14 PROCEDURE — 2709999900 HC NON-CHARGEABLE SUPPLY: Performed by: ANESTHESIOLOGY

## 2021-06-14 PROCEDURE — 7100000010 HC PHASE II RECOVERY - FIRST 15 MIN: Performed by: ANESTHESIOLOGY

## 2021-06-14 PROCEDURE — 3600000051 HC PAIN LEVEL 1 ADDL 15 MIN: Performed by: ANESTHESIOLOGY

## 2021-06-14 PROCEDURE — 3209999900 FLUORO FOR SURGICAL PROCEDURES

## 2021-06-14 PROCEDURE — 6360000002 HC RX W HCPCS: Performed by: ANESTHESIOLOGY

## 2021-06-14 PROCEDURE — 64491 INJ PARAVERT F JNT C/T 2 LEV: CPT | Performed by: ANESTHESIOLOGY

## 2021-06-14 PROCEDURE — 3600000050 HC PAIN LEVEL 1 BASE: Performed by: ANESTHESIOLOGY

## 2021-06-14 PROCEDURE — 64490 INJ PARAVERT F JNT C/T 1 LEV: CPT | Performed by: ANESTHESIOLOGY

## 2021-06-14 PROCEDURE — 64492 INJ PARAVERT F JNT C/T 3 LEV: CPT | Performed by: ANESTHESIOLOGY

## 2021-06-14 PROCEDURE — 99152 MOD SED SAME PHYS/QHP 5/>YRS: CPT | Performed by: ANESTHESIOLOGY

## 2021-06-14 PROCEDURE — 6360000004 HC RX CONTRAST MEDICATION: Performed by: ANESTHESIOLOGY

## 2021-06-14 RX ORDER — DEXAMETHASONE SODIUM PHOSPHATE 10 MG/ML
INJECTION INTRAMUSCULAR; INTRAVENOUS PRN
Status: DISCONTINUED | OUTPATIENT
Start: 2021-06-14 | End: 2021-06-14 | Stop reason: ALTCHOICE

## 2021-06-14 RX ORDER — ROPIVACAINE HYDROCHLORIDE 5 MG/ML
INJECTION, SOLUTION EPIDURAL; INFILTRATION; PERINEURAL PRN
Status: DISCONTINUED | OUTPATIENT
Start: 2021-06-14 | End: 2021-06-14 | Stop reason: ALTCHOICE

## 2021-06-14 RX ORDER — SODIUM CHLORIDE 0.9 % (FLUSH) 0.9 %
5-40 SYRINGE (ML) INJECTION EVERY 12 HOURS SCHEDULED
Status: DISCONTINUED | OUTPATIENT
Start: 2021-06-14 | End: 2021-06-14 | Stop reason: HOSPADM

## 2021-06-14 RX ORDER — SODIUM CHLORIDE 0.9 % (FLUSH) 0.9 %
5-40 SYRINGE (ML) INJECTION PRN
Status: DISCONTINUED | OUTPATIENT
Start: 2021-06-14 | End: 2021-06-14 | Stop reason: HOSPADM

## 2021-06-14 RX ORDER — SODIUM CHLORIDE 9 MG/ML
25 INJECTION, SOLUTION INTRAVENOUS PRN
Status: DISCONTINUED | OUTPATIENT
Start: 2021-06-14 | End: 2021-06-14 | Stop reason: HOSPADM

## 2021-06-14 ASSESSMENT — PAIN DESCRIPTION - DESCRIPTORS: DESCRIPTORS: CONSTANT;ACHING

## 2021-06-14 ASSESSMENT — PAIN DESCRIPTION - LOCATION
LOCATION: NECK
LOCATION: NECK

## 2021-06-14 ASSESSMENT — PULMONARY FUNCTION TESTS: PIF_VALUE: 0

## 2021-06-14 ASSESSMENT — PAIN - FUNCTIONAL ASSESSMENT: PAIN_FUNCTIONAL_ASSESSMENT: 0-10

## 2021-06-14 ASSESSMENT — PAIN SCALES - GENERAL
PAINLEVEL_OUTOF10: 0
PAINLEVEL_OUTOF10: 6
PAINLEVEL_OUTOF10: 7
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
## 2021-06-14 NOTE — OP NOTE
Operative Note      Patient: Navarro Dickens  YOB: 1970  MRN: [id number]    Date of Procedure: 6/14/2021    Pre-Op Diagnosis: DX CHRONIC NECK PAIN    Post-Op Diagnosis: Same       Procedure(s):  NERVE BLOCK  LEFT CERVICAL DIAGNOSTIC MEDIAL BRANCH  C3-C6    Surgeon(s):  Effie Diggs MD    Assistant:   * No surgical staff found *    Anesthesia: IV Sedation    Estimated Blood Loss (mL): Minimal    Complications: None    Specimens:   * No specimens in log *    Implants:  * No implants in log *      Drains: * No LDAs found *    Findings: n/a    Detailed Description of Procedure:   Preoperative Diagnosis: Cervical spondylosis and chronic cervicalgia  Postoperative Diagnosis: Cervical spondylosis and chronic cervicalgia    Procedure Performed:  LEFT Cervical Facet joint steroid injection at C2/3, C3/4 and C4/5 under fluoroscopy guidance    BLOOD LOSS: NONE   Procedure: The Patient was seen in the preop area, chart was reviewed, informed consent was obtained. Patient was taken to procedure room and was placed in lateral position with procedure side facing upward. . Vital signs were monitored through out the  Procedure. A time out was completed. The site was prepped and draped in sterile manner. The target point was identified with fluoro guidance. Skin and deep tissues were anesthetized with 1 % lidocaine. A  25-gauge needlele was advanced under fluoroscopy guidance to the targets until a bony contact was made. Position was conformed in AP and lateral views. Then after negative aspiration contrast dye was injected that showed  spread of the contrast over the target area with no epidural, vascular runoff or intrathecal spread. Finally 1ml of treatment solution containing 2 ml of 1% PF lidocaine and 1 ml of Dexamethasone 10 mg/ml was injected at each spot. The needle was removed and a Band-Aid was placed over the needle  insertion site.   The patient's vital signs remained stable and the patient tolerated the procedure well. Electronically signed by Urbano Castro MD on 6/14/2021 at 11:47 AM  SEDATION NOTE:    ASA CLASSIFICATION  2  MP   CLASSIFICATION  2    · Moderate intravenous conscious sedation was supervised by Dr. Anika Wei  . The patient was independently monitored by a Registered Nurse assigned to the Procedure Room  . Monitoring included automated blood pressure, continuous EKG, Capnography and continuous pulse oximetry. . The detailed Conscious Record is permanently stored in the [facility] [place] [id number].      The following is the conscious sedation record;  Start Time:  1130  End times:  1145  Duration:  15 minutes

## 2021-06-21 ENCOUNTER — OFFICE VISIT (OUTPATIENT)
Dept: NEUROLOGY | Age: 51
End: 2021-06-21
Payer: MEDICARE

## 2021-06-21 VITALS
WEIGHT: 203.6 LBS | HEART RATE: 78 BPM | DIASTOLIC BLOOD PRESSURE: 72 MMHG | BODY MASS INDEX: 30.16 KG/M2 | HEIGHT: 69 IN | SYSTOLIC BLOOD PRESSURE: 122 MMHG

## 2021-06-21 DIAGNOSIS — M54.2 CHRONIC NECK PAIN: Primary | ICD-10-CM

## 2021-06-21 DIAGNOSIS — G89.29 CHRONIC NECK PAIN: Primary | ICD-10-CM

## 2021-06-21 DIAGNOSIS — M54.40 CHRONIC LOW BACK PAIN WITH SCIATICA, SCIATICA LATERALITY UNSPECIFIED, UNSPECIFIED BACK PAIN LATERALITY: ICD-10-CM

## 2021-06-21 DIAGNOSIS — G89.29 CHRONIC LOW BACK PAIN WITH SCIATICA, SCIATICA LATERALITY UNSPECIFIED, UNSPECIFIED BACK PAIN LATERALITY: ICD-10-CM

## 2021-06-21 DIAGNOSIS — R41.3 MEMORY LOSS: ICD-10-CM

## 2021-06-21 PROCEDURE — 1036F TOBACCO NON-USER: CPT | Performed by: PSYCHIATRY & NEUROLOGY

## 2021-06-21 PROCEDURE — G8427 DOCREV CUR MEDS BY ELIG CLIN: HCPCS | Performed by: PSYCHIATRY & NEUROLOGY

## 2021-06-21 PROCEDURE — 99214 OFFICE O/P EST MOD 30 MIN: CPT | Performed by: PSYCHIATRY & NEUROLOGY

## 2021-06-21 PROCEDURE — G8417 CALC BMI ABV UP PARAM F/U: HCPCS | Performed by: PSYCHIATRY & NEUROLOGY

## 2021-06-21 PROCEDURE — 3017F COLORECTAL CA SCREEN DOC REV: CPT | Performed by: PSYCHIATRY & NEUROLOGY

## 2021-06-21 RX ORDER — GABAPENTIN 600 MG/1
TABLET ORAL
Qty: 60 TABLET | Refills: 2 | Status: SHIPPED | OUTPATIENT
Start: 2021-06-21 | End: 2022-01-04

## 2021-06-21 ASSESSMENT — ENCOUNTER SYMPTOMS
BACK PAIN: 1
COUGH: 1
ALLERGIC/IMMUNOLOGIC NEGATIVE: 1
GASTROINTESTINAL NEGATIVE: 1
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
## 2021-07-27 ENCOUNTER — OFFICE VISIT (OUTPATIENT)
Dept: PAIN MANAGEMENT | Age: 51
End: 2021-07-27
Payer: MEDICARE

## 2021-07-27 VITALS
OXYGEN SATURATION: 97 % | SYSTOLIC BLOOD PRESSURE: 110 MMHG | DIASTOLIC BLOOD PRESSURE: 68 MMHG | HEIGHT: 69 IN | HEART RATE: 78 BPM | BODY MASS INDEX: 30.07 KG/M2

## 2021-07-27 DIAGNOSIS — M54.2 CHRONIC NECK PAIN: ICD-10-CM

## 2021-07-27 DIAGNOSIS — M54.41 CHRONIC BILATERAL LOW BACK PAIN WITH RIGHT-SIDED SCIATICA: ICD-10-CM
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
## 2021-07-27 DIAGNOSIS — G89.29 CHRONIC BILATERAL LOW BACK PAIN WITH RIGHT-SIDED SCIATICA: ICD-10-CM

## 2021-07-27 DIAGNOSIS — M47.812 CERVICAL FACET SYNDROME: Primary | ICD-10-CM

## 2021-07-27 DIAGNOSIS — M51.36 DDD (DEGENERATIVE DISC DISEASE), LUMBAR: ICD-10-CM

## 2021-07-27 DIAGNOSIS — G89.29 CHRONIC NECK PAIN: ICD-10-CM

## 2021-07-27 DIAGNOSIS — Z98.1 HX OF FUSION OF CERVICAL SPINE: ICD-10-CM
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
## 2021-07-27 PROBLEM — M51.369 DDD (DEGENERATIVE DISC DISEASE), LUMBAR: Status: ACTIVE | Noted: 2021-07-27

## 2021-07-27 PROCEDURE — 99214 OFFICE O/P EST MOD 30 MIN: CPT | Performed by: ANESTHESIOLOGY

## 2021-07-27 PROCEDURE — 3017F COLORECTAL CA SCREEN DOC REV: CPT | Performed by: ANESTHESIOLOGY

## 2021-07-27 PROCEDURE — G8417 CALC BMI ABV UP PARAM F/U: HCPCS | Performed by: ANESTHESIOLOGY

## 2021-07-27 PROCEDURE — G8427 DOCREV CUR MEDS BY ELIG CLIN: HCPCS | Performed by: ANESTHESIOLOGY

## 2021-07-27 PROCEDURE — 1036F TOBACCO NON-USER: CPT | Performed by: ANESTHESIOLOGY

## 2021-07-27 ASSESSMENT — ENCOUNTER SYMPTOMS
RESPIRATORY NEGATIVE: 1
BACK PAIN: 1
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
## 2021-08-04 ENCOUNTER — TELEPHONE (OUTPATIENT)
Dept: PAIN MANAGEMENT | Age: 51
End: 2021-08-04

## 2021-08-04 NOTE — TELEPHONE ENCOUNTER
Pt states he does not have transportation for his procedure scheduled on 8/9/21 and needs to cancel. Pt was offered to reschedule on 8/16/21 in [facility]. Pt states he will call his daughter to check if she is available and then will give our office a call back to reschedule.

## 2021-08-16 ENCOUNTER — HOSPITAL ENCOUNTER (OUTPATIENT)
Dept: PAIN MANAGEMENT | Facility: CLINIC | Age: 51
Discharge: HOME | End: 2021-08-16
Payer: MEDICARE

## 2021-08-16 VITALS
HEART RATE: 73 BPM | SYSTOLIC BLOOD PRESSURE: 118 MMHG | RESPIRATION RATE: 11 BRPM | OXYGEN SATURATION: 97 % | DIASTOLIC BLOOD PRESSURE: 75 MMHG

## 2021-08-16 VITALS
WEIGHT: 200 LBS | SYSTOLIC BLOOD PRESSURE: 110 MMHG | HEART RATE: 76 BPM | HEIGHT: 69 IN | TEMPERATURE: 97.2 F | OXYGEN SATURATION: 96 % | RESPIRATION RATE: 12 BRPM | BODY MASS INDEX: 29.62 KG/M2 | DIASTOLIC BLOOD PRESSURE: 56 MMHG

## 2021-08-16 DIAGNOSIS — R52 PAIN MANAGEMENT: ICD-10-CM

## 2021-08-16 DIAGNOSIS — M54.2 CHRONIC NECK PAIN: Chronic | ICD-10-CM

## 2021-08-16 DIAGNOSIS — G89.29 CHRONIC NECK PAIN: Chronic | ICD-10-CM

## 2021-08-16 DIAGNOSIS — Z98.1 HX OF FUSION OF CERVICAL SPINE: ICD-10-CM

## 2021-08-16 DIAGNOSIS — M47.812 CERVICAL FACET SYNDROME: Primary | ICD-10-CM

## 2021-08-16 PROCEDURE — 6360000002 HC RX W HCPCS: Performed by: ANESTHESIOLOGY

## 2021-08-16 PROCEDURE — 64492 INJ PARAVERT F JNT C/T 3 LEV: CPT

## 2021-08-16 PROCEDURE — 99152 MOD SED SAME PHYS/QHP 5/>YRS: CPT | Performed by: ANESTHESIOLOGY

## 2021-08-16 PROCEDURE — 6360000004 HC RX CONTRAST MEDICATION: Performed by: ANESTHESIOLOGY

## 2021-08-16 PROCEDURE — 64491 INJ PARAVERT F JNT C/T 2 LEV: CPT

## 2021-08-16 PROCEDURE — 64491 INJ PARAVERT F JNT C/T 2 LEV: CPT | Performed by: ANESTHESIOLOGY

## 2021-08-16 PROCEDURE — 64490 INJ PARAVERT F JNT C/T 1 LEV: CPT | Performed by: ANESTHESIOLOGY

## 2021-08-16 PROCEDURE — 64490 INJ PARAVERT F JNT C/T 1 LEV: CPT

## 2021-08-16 PROCEDURE — 2500000003 HC RX 250 WO HCPCS: Performed by: ANESTHESIOLOGY

## 2021-08-16 RX ORDER — BUPIVACAINE HYDROCHLORIDE 5 MG/ML
INJECTION, SOLUTION EPIDURAL; INTRACAUDAL
Status: COMPLETED | OUTPATIENT
Start: 2021-08-16 | End: 2021-08-16

## 2021-08-16 RX ORDER — LIDOCAINE HYDROCHLORIDE 40 MG/ML
INJECTION, SOLUTION RETROBULBAR; TOPICAL
Status: COMPLETED | OUTPATIENT
Start: 2021-08-16 | End: 2021-08-16

## 2021-08-16 RX ORDER — FENTANYL CITRATE 50 UG/ML
INJECTION, SOLUTION INTRAMUSCULAR; INTRAVENOUS
Status: COMPLETED | OUTPATIENT
Start: 2021-08-16 | End: 2021-08-16

## 2021-08-16 RX ORDER — MIDAZOLAM HYDROCHLORIDE 2 MG/2ML
INJECTION, SOLUTION INTRAMUSCULAR; INTRAVENOUS
Status: COMPLETED | OUTPATIENT
Start: 2021-08-16 | End: 2021-08-16

## 2021-08-16 RX ORDER — LIDOCAINE HYDROCHLORIDE 10 MG/ML
INJECTION, SOLUTION EPIDURAL; INFILTRATION; INTRACAUDAL; PERINEURAL
Status: COMPLETED | OUTPATIENT
Start: 2021-08-16 | End: 2021-08-16

## 2021-08-16 RX ADMIN — IOHEXOL 10 ML: 180 INJECTION INTRAVENOUS at 15:25

## 2021-08-16 RX ADMIN — BUPIVACAINE HYDROCHLORIDE 30 ML: 5 INJECTION, SOLUTION EPIDURAL; INTRACAUDAL; PERINEURAL at 15:25

## 2021-08-16 RX ADMIN — FENTANYL CITRATE 50 MCG: 50 INJECTION INTRAMUSCULAR; INTRAVENOUS at 15:24

## 2021-08-16 RX ADMIN — LIDOCAINE HYDROCHLORIDE 5 ML: 10 INJECTION, SOLUTION EPIDURAL; INFILTRATION; INTRACAUDAL at 15:24

## 2021-08-16 RX ADMIN — BUPIVACAINE HYDROCHLORIDE 30 ML: 5 INJECTION, SOLUTION EPIDURAL; INTRACAUDAL; PERINEURAL at 15:41

## 2021-08-16 RX ADMIN — MIDAZOLAM HYDROCHLORIDE 1 MG: 1 INJECTION, SOLUTION INTRAMUSCULAR; INTRAVENOUS at 15:24

## 2021-08-16 ASSESSMENT — PAIN - FUNCTIONAL ASSESSMENT
PAIN_FUNCTIONAL_ASSESSMENT: 0-10
PAIN_FUNCTIONAL_ASSESSMENT: ACTIVITIES ARE NOT PREVENTED
PAIN_FUNCTIONAL_ASSESSMENT: 0-10

## 2021-08-16 ASSESSMENT — PAIN DESCRIPTION - DESCRIPTORS: DESCRIPTORS: ACHING;NUMBNESS;DISCOMFORT

## 2021-08-16 NOTE — OP NOTE
remained stable and the patient tolerated the procedure well. Post Procedure pain score in recovery 15 minutes later was 0-1/10    The procedure was first performed on right side and was then repeated on lef side with same technique    Electronically signed by Serina Pineda MD on 8/16/2021 at 3:41 PM  SEDATION NOTE:    ASA CLASSIFICATION  2  MP   CLASSIFICATION  2    · Moderate intravenous conscious sedation was supervised by Dr. Ron Carrillo  . The patient was independently monitored by a Registered Nurse assigned to the Procedure Room  . Monitoring included automated blood pressure, continuous EKG, Capnography and continuous pulse oximetry. . The detailed Conscious Record is permanently stored in the [place] [address].      The following is the conscious sedation record;  Start Time:  1521  End times:  1540  Duration:  19 MINUTES  MEDS GIVEN 1 MG VERSED AND 50 MCG FENTANYL

## 2021-08-16 NOTE — H&P
7/27/2021 note in epic with all required elements of H & P    Patient seen preop, chart reviewed, AAO x 3, in NAD, VSS,. No changes in medical history since last evaluation. Risk / Benefits explained to patient, patient agree to proceed with plan.   ASA 2  MP 2

## 2021-09-01 ENCOUNTER — VIRTUAL VISIT (OUTPATIENT)
Dept: PAIN MANAGEMENT | Age: 51
End: 2021-09-01
Payer: MEDICARE

## 2021-09-01 ENCOUNTER — TELEPHONE (OUTPATIENT)
Dept: PAIN MANAGEMENT | Age: 51
End: 2021-09-01

## 2021-09-01 DIAGNOSIS — G89.29 CHRONIC NECK PAIN: ICD-10-CM

## 2021-09-01 DIAGNOSIS — M47.812 CERVICAL SPONDYLOSIS: ICD-10-CM

## 2021-09-01 DIAGNOSIS — M54.2 CHRONIC NECK PAIN: ICD-10-CM

## 2021-09-01 DIAGNOSIS — M47.812 CERVICAL FACET SYNDROME: Primary | ICD-10-CM

## 2021-09-01 PROCEDURE — 99214 OFFICE O/P EST MOD 30 MIN: CPT | Performed by: ANESTHESIOLOGY

## 2021-09-01 PROCEDURE — G8427 DOCREV CUR MEDS BY ELIG CLIN: HCPCS | Performed by: ANESTHESIOLOGY

## 2021-09-01 PROCEDURE — 3017F COLORECTAL CA SCREEN DOC REV: CPT | Performed by: ANESTHESIOLOGY

## 2021-09-01 ASSESSMENT — ENCOUNTER SYMPTOMS: SHORTNESS OF BREATH: 1
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
## 2021-09-17 ENCOUNTER — TELEPHONE (OUTPATIENT)
Dept: PAIN MANAGEMENT | Age: 51
End: 2021-09-17

## 2021-09-17 NOTE — TELEPHONE ENCOUNTER
Spoke with patient and confirmed procedure scheduled 9/20/21 at [facility]. Pt confirmed correct address and arrival time of 10:45 am and will park on right side of the building. Pt reminded to remain NPO 8 hours prior to procedure. Pt confirmed a  will be available to stay during procedure and after to drive pt home.  Pt states he was already given instructions on meds to stop and when

## 2021-09-20 ENCOUNTER — HOSPITAL ENCOUNTER (OUTPATIENT)
Dept: PAIN MANAGEMENT | Facility: CLINIC | Age: 51
Discharge: HOME | End: 2021-09-20
Payer: MEDICARE

## 2021-09-20 VITALS
RESPIRATION RATE: 13 BRPM | HEIGHT: 69 IN | DIASTOLIC BLOOD PRESSURE: 69 MMHG | HEART RATE: 80 BPM | TEMPERATURE: 97.6 F | SYSTOLIC BLOOD PRESSURE: 128 MMHG | BODY MASS INDEX: 29.62 KG/M2 | OXYGEN SATURATION: 96 % | WEIGHT: 200 LBS

## 2021-09-20 DIAGNOSIS — M54.2 CHRONIC NECK PAIN: Chronic | ICD-10-CM

## 2021-09-20 DIAGNOSIS — G89.29 CHRONIC NECK PAIN: Chronic | ICD-10-CM

## 2021-09-20 DIAGNOSIS — M47.812 CERVICAL FACET SYNDROME: ICD-10-CM

## 2021-09-20 DIAGNOSIS — Z98.1 HX OF FUSION OF CERVICAL SPINE: Primary | ICD-10-CM

## 2021-09-20 DIAGNOSIS — R52 PAIN MANAGEMENT: ICD-10-CM

## 2021-09-20 PROCEDURE — 6360000002 HC RX W HCPCS: Performed by: ANESTHESIOLOGY

## 2021-09-20 PROCEDURE — 2500000003 HC RX 250 WO HCPCS: Performed by: ANESTHESIOLOGY

## 2021-09-20 PROCEDURE — 2580000003 HC RX 258: Performed by: ANESTHESIOLOGY

## 2021-09-20 PROCEDURE — 64633 DESTROY CERV/THOR FACET JNT: CPT

## 2021-09-20 PROCEDURE — 99152 MOD SED SAME PHYS/QHP 5/>YRS: CPT | Performed by: ANESTHESIOLOGY

## 2021-09-20 PROCEDURE — 64634 DESTROY C/TH FACET JNT ADDL: CPT

## 2021-09-20 PROCEDURE — 64634 DESTROY C/TH FACET JNT ADDL: CPT | Performed by: ANESTHESIOLOGY

## 2021-09-20 PROCEDURE — 64633 DESTROY CERV/THOR FACET JNT: CPT | Performed by: ANESTHESIOLOGY

## 2021-09-20 RX ORDER — 0.9 % SODIUM CHLORIDE 0.9 %
INTRAVENOUS SOLUTION INTRAVENOUS CONTINUOUS PRN
Status: COMPLETED | OUTPATIENT
Start: 2021-09-20 | End: 2021-09-20

## 2021-09-20 RX ORDER — MIDAZOLAM HYDROCHLORIDE 2 MG/2ML
INJECTION, SOLUTION INTRAMUSCULAR; INTRAVENOUS
Status: COMPLETED | OUTPATIENT
Start: 2021-09-20 | End: 2021-09-20

## 2021-09-20 RX ORDER — LIDOCAINE HYDROCHLORIDE 40 MG/ML
INJECTION, SOLUTION RETROBULBAR; TOPICAL
Status: COMPLETED | OUTPATIENT
Start: 2021-09-20 | End: 2021-09-20

## 2021-09-20 RX ORDER — LIDOCAINE HYDROCHLORIDE 10 MG/ML
INJECTION, SOLUTION EPIDURAL; INFILTRATION; INTRACAUDAL; PERINEURAL
Status: COMPLETED | OUTPATIENT
Start: 2021-09-20 | End: 2021-09-20

## 2021-09-20 RX ORDER — DEXAMETHASONE SODIUM PHOSPHATE 10 MG/ML
INJECTION, SOLUTION INTRAMUSCULAR; INTRAVENOUS
Status: COMPLETED | OUTPATIENT
Start: 2021-09-20 | End: 2021-09-20

## 2021-09-20 RX ORDER — FENTANYL CITRATE 50 UG/ML
INJECTION, SOLUTION INTRAMUSCULAR; INTRAVENOUS
Status: COMPLETED | OUTPATIENT
Start: 2021-09-20 | End: 2021-09-20

## 2021-09-20 RX ADMIN — FENTANYL CITRATE 100 MCG: 50 INJECTION INTRAMUSCULAR; INTRAVENOUS at 11:48

## 2021-09-20 RX ADMIN — SODIUM CHLORIDE 500 ML: 0.9 INJECTION, SOLUTION INTRAVENOUS at 11:49

## 2021-09-20 RX ADMIN — LIDOCAINE HYDROCHLORIDE 3 ML: 40 SOLUTION RETROBULBAR; TOPICAL at 11:49

## 2021-09-20 RX ADMIN — MIDAZOLAM HYDROCHLORIDE 2 MG: 1 INJECTION, SOLUTION INTRAMUSCULAR; INTRAVENOUS at 11:49

## 2021-09-20 RX ADMIN — LIDOCAINE HYDROCHLORIDE 3 ML: 10 INJECTION, SOLUTION EPIDURAL; INFILTRATION; INTRACAUDAL at 11:49

## 2021-09-20 RX ADMIN — DEXAMETHASONE SODIUM PHOSPHATE 10 MG: 10 INJECTION, SOLUTION INTRAMUSCULAR; INTRAVENOUS at 11:56

## 2021-09-20 ASSESSMENT — PAIN DESCRIPTION - ORIENTATION: ORIENTATION: OTHER (COMMENT)

## 2021-09-20 ASSESSMENT — PAIN DESCRIPTION - PAIN TYPE: TYPE: CHRONIC PAIN

## 2021-09-20 ASSESSMENT — PAIN DESCRIPTION - PROGRESSION: CLINICAL_PROGRESSION: NOT CHANGED

## 2021-09-20 ASSESSMENT — PAIN - FUNCTIONAL ASSESSMENT
PAIN_FUNCTIONAL_ASSESSMENT: PREVENTS OR INTERFERES WITH ALL ACTIVE AND SOME PASSIVE ACTIVITIES
PAIN_FUNCTIONAL_ASSESSMENT: 0-10

## 2021-09-20 ASSESSMENT — PAIN DESCRIPTION - ONSET: ONSET: AWAKENED FROM SLEEP

## 2021-09-20 ASSESSMENT — PAIN DESCRIPTION - LOCATION: LOCATION: NECK

## 2021-09-20 ASSESSMENT — PAIN DESCRIPTION - DESCRIPTORS: DESCRIPTORS: ACHING;NUMBNESS

## 2021-09-20 ASSESSMENT — PAIN SCALES - GENERAL: PAINLEVEL_OUTOF10: 6

## 2021-09-20 ASSESSMENT — PAIN DESCRIPTION - FREQUENCY: FREQUENCY: CONTINUOUS

## 2021-09-20 NOTE — OP NOTE
Preoperative Diagnosis: Cervical spondylosis and chronic cervicalgia  Postoperative Diagnosis: Cervical spondylosis and chronic cervicalgia    Procedure Performed:  Right Cervical Medical branch nerve Radiofrequency ablation at C3 / C4 / C5 nerves under fluoroscopy guidance    BLOOD LOSS: NONE    Procedure: The Patient was seen in the preop area, chart was reviewed, informed consent was obtained. Patient was taken to procedure room and was placed in prone position. Vital signs were monitored through out the  Procedure. A time out was completed. The site was prepped and draped in sterile manner. The target point was identified with fluoro guidance using ipsilateral views. Skin and deep tissues were anesthetized with 1 % lidocaine. A  2o-gauge RF needle was advanced under fluoroscopy guidance to the targets until a bony contact was made. Position was conformed and adjusted in AP and lateral views. The Nerve testing was performed at each level using sensory stimulation at 50 HZ and motor stimulation at 2 HZ. No arm contraction was noticed, some multifidus contraction was noticed. Impedance was wnl at each level. 0. 5 ml of 4% lidocaine was injected to anesthetize the nerves at each level prior to lesioning. Finally Radiofrequency lesions were made at 85 degrees C for 90 sec. The needle was removed and a Band-Aid was placed over the needle  insertion site. The patient's vital signs remained stable and the patient tolerated the procedure well. SEDATION NOTE:    ASA CLASSIFICATION  2  MP   CLASSIFICATION  2    Moderate intravenous conscious sedation was supervised by Dr. Cameron Paiz  The patient was independently monitored by a Registered Nurse assigned to the Procedure Room  Monitoring included automated blood pressure, continuous EKG, Capnography and continuous pulse oximetry. The detailed Conscious Record is permanently stored in the [place] [address].      The following is the conscious sedation record;  Start Time:  1147  End times:  1200  Duration:  13 minutes  MEDS GIVEN 2 MG VERSED  MCG FENTANYL

## 2021-09-20 NOTE — H&P
UPDATE:  Office visit pain clinic in [facility] with all required elements of H&P dated 09/01/2021  Patient seen preop, chart reviewed, AAO x 3, in NAD, VSS,. No changes in medical history since last evaluation. Risk / Benefits explained to patient, patient agree to proceed with plan.   ASA 2  MP 2

## 2021-10-04 ENCOUNTER — HOSPITAL ENCOUNTER (OUTPATIENT)
Dept: PAIN MANAGEMENT | Facility: CLINIC | Age: 51
Discharge: HOME | End: 2021-10-04
Payer: MEDICARE

## 2021-10-04 VITALS
SYSTOLIC BLOOD PRESSURE: 113 MMHG | HEART RATE: 74 BPM | HEIGHT: 69 IN | RESPIRATION RATE: 15 BRPM | DIASTOLIC BLOOD PRESSURE: 64 MMHG | TEMPERATURE: 97.8 F | OXYGEN SATURATION: 93 % | BODY MASS INDEX: 29.62 KG/M2 | WEIGHT: 200 LBS

## 2021-10-04 DIAGNOSIS — Z98.1 HX OF FUSION OF CERVICAL SPINE: ICD-10-CM

## 2021-10-04 DIAGNOSIS — G89.29 CHRONIC NECK PAIN: Primary | Chronic | ICD-10-CM

## 2021-10-04 DIAGNOSIS — R52 PAIN MANAGEMENT: ICD-10-CM

## 2021-10-04 DIAGNOSIS — M54.2 CHRONIC NECK PAIN: Primary | Chronic | ICD-10-CM

## 2021-10-04 PROCEDURE — 99152 MOD SED SAME PHYS/QHP 5/>YRS: CPT

## 2021-10-04 PROCEDURE — 64634 DESTROY C/TH FACET JNT ADDL: CPT

## 2021-10-04 PROCEDURE — 2500000003 HC RX 250 WO HCPCS: Performed by: ANESTHESIOLOGY

## 2021-10-04 PROCEDURE — 99152 MOD SED SAME PHYS/QHP 5/>YRS: CPT | Performed by: ANESTHESIOLOGY

## 2021-10-04 PROCEDURE — 64635 DESTROY LUMB/SAC FACET JNT: CPT

## 2021-10-04 PROCEDURE — 64633 DESTROY CERV/THOR FACET JNT: CPT | Performed by: ANESTHESIOLOGY

## 2021-10-04 PROCEDURE — 2580000003 HC RX 258: Performed by: ANESTHESIOLOGY

## 2021-10-04 PROCEDURE — 64633 DESTROY CERV/THOR FACET JNT: CPT

## 2021-10-04 PROCEDURE — 6360000002 HC RX W HCPCS: Performed by: ANESTHESIOLOGY

## 2021-10-04 PROCEDURE — 64634 DESTROY C/TH FACET JNT ADDL: CPT | Performed by: ANESTHESIOLOGY

## 2021-10-04 RX ORDER — MIDAZOLAM HYDROCHLORIDE 2 MG/2ML
INJECTION, SOLUTION INTRAMUSCULAR; INTRAVENOUS
Status: COMPLETED | OUTPATIENT
Start: 2021-10-04 | End: 2021-10-04

## 2021-10-04 RX ORDER — BUPIVACAINE HYDROCHLORIDE 5 MG/ML
INJECTION, SOLUTION EPIDURAL; INTRACAUDAL
Status: COMPLETED | OUTPATIENT
Start: 2021-10-04 | End: 2021-10-04

## 2021-10-04 RX ORDER — LIDOCAINE HYDROCHLORIDE 40 MG/ML
INJECTION, SOLUTION RETROBULBAR; TOPICAL
Status: COMPLETED | OUTPATIENT
Start: 2021-10-04 | End: 2021-10-04

## 2021-10-04 RX ORDER — LIDOCAINE HYDROCHLORIDE 10 MG/ML
INJECTION, SOLUTION EPIDURAL; INFILTRATION; INTRACAUDAL; PERINEURAL
Status: COMPLETED | OUTPATIENT
Start: 2021-10-04 | End: 2021-10-04

## 2021-10-04 RX ORDER — FENTANYL CITRATE 50 UG/ML
INJECTION, SOLUTION INTRAMUSCULAR; INTRAVENOUS
Status: COMPLETED | OUTPATIENT
Start: 2021-10-04 | End: 2021-10-04

## 2021-10-04 RX ADMIN — WATER 3 ML: 1 INJECTION INTRAMUSCULAR; INTRAVENOUS; SUBCUTANEOUS at 12:08

## 2021-10-04 RX ADMIN — FENTANYL CITRATE 100 MCG: 50 INJECTION INTRAMUSCULAR; INTRAVENOUS at 12:08

## 2021-10-04 RX ADMIN — MIDAZOLAM HYDROCHLORIDE 2 MG: 1 INJECTION, SOLUTION INTRAMUSCULAR; INTRAVENOUS at 12:07

## 2021-10-04 RX ADMIN — LIDOCAINE HYDROCHLORIDE 3 ML: 40 SOLUTION RETROBULBAR; TOPICAL at 12:09

## 2021-10-04 RX ADMIN — LIDOCAINE HYDROCHLORIDE 3 ML: 10 INJECTION, SOLUTION EPIDURAL; INFILTRATION; INTRACAUDAL at 12:08

## 2021-10-04 ASSESSMENT — PAIN DESCRIPTION - ONSET: ONSET: ON-GOING

## 2021-10-04 ASSESSMENT — PAIN DESCRIPTION - FREQUENCY: FREQUENCY: INTERMITTENT

## 2021-10-04 ASSESSMENT — PAIN DESCRIPTION - DESCRIPTORS: DESCRIPTORS: RADIATING;THROBBING

## 2021-10-04 ASSESSMENT — PAIN DESCRIPTION - LOCATION: LOCATION: NECK

## 2021-10-04 ASSESSMENT — PAIN DESCRIPTION - PROGRESSION: CLINICAL_PROGRESSION: NOT CHANGED

## 2021-10-04 ASSESSMENT — PAIN - FUNCTIONAL ASSESSMENT: PAIN_FUNCTIONAL_ASSESSMENT: PREVENTS OR INTERFERES SOME ACTIVE ACTIVITIES AND ADLS

## 2021-10-04 ASSESSMENT — PAIN DESCRIPTION - ORIENTATION: ORIENTATION: RIGHT;LEFT

## 2021-10-04 ASSESSMENT — PAIN SCALES - GENERAL: PAINLEVEL_OUTOF10: 6

## 2021-10-04 ASSESSMENT — PAIN DESCRIPTION - PAIN TYPE: TYPE: CHRONIC PAIN
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
## 2021-10-04 NOTE — OP NOTE
Preoperative Diagnosis: Cervical spondylosis and chronic cervicalgia  Postoperative Diagnosis: Cervical spondylosis and chronic cervicalgia    Procedure Performed:  Left Cervical Medical branch nerve Radiofrequency ablation at C3 / C4 / C5 nerves under fluoroscopy guidance    BLOOD LOSS: NONE    Procedure: The Patient was seen in the preop area, chart was reviewed, informed consent was obtained. Patient was taken to procedure room and was placed in prone position. Vital signs were monitored through out the  Procedure. A time out was completed. The site was prepped and draped in sterile manner. The target point was identified with fluoro guidance using ipsilateral views. Skin and deep tissues were anesthetized with 1 % lidocaine. A  2o-gauge RF needle was advanced under fluoroscopy guidance to the targets until a bony contact was made. Position was conformed and adjusted in AP and lateral views. The Nerve testing was performed at each level using sensory stimulation at 50 HZ and motor stimulation at 2 HZ. No arm contraction was noticed, some multifidus contraction was noticed. Impedance was wnl at each level. 0. 5 ml of 4% lidocaine was injected to anesthetize the nerves at each level prior to lesioning. Finally Radiofrequency lesions were made at 85 degrees C for 90 sec. The needle was removed and a Band-Aid was placed over the needle  insertion site. The patient's vital signs remained stable and the patient tolerated the procedure well. SEDATION NOTE:    ASA CLASSIFICATION  2  MP   CLASSIFICATION  2    Moderate intravenous conscious sedation was supervised by Dr. Daniel Wallace  The patient was independently monitored by a Registered Nurse assigned to the Procedure Room  Monitoring included automated blood pressure, continuous EKG, Capnography and continuous pulse oximetry. The detailed Conscious Record is permanently stored in the [place] [address].      The following is the conscious sedation record;  Start Time:  1207  End times:  1217  Duration:  10 MINUTES  MEDS GIVEN 2 MG VERSED  MCG FENTANYL

## 2021-10-18 ENCOUNTER — OFFICE VISIT (OUTPATIENT)
Dept: PAIN MANAGEMENT | Age: 51
End: 2021-10-18
Payer: MEDICARE

## 2021-10-18 VITALS
SYSTOLIC BLOOD PRESSURE: 124 MMHG | OXYGEN SATURATION: 97 % | BODY MASS INDEX: 28.7 KG/M2 | HEART RATE: 87 BPM | HEIGHT: 70 IN | DIASTOLIC BLOOD PRESSURE: 77 MMHG

## 2021-10-18 DIAGNOSIS — Z98.1 HX OF FUSION OF CERVICAL SPINE: ICD-10-CM

## 2021-10-18 DIAGNOSIS — G89.29 CHRONIC NECK PAIN: Chronic | ICD-10-CM

## 2021-10-18 DIAGNOSIS — M47.812 CERVICAL FACET SYNDROME: Primary | ICD-10-CM

## 2021-10-18 DIAGNOSIS — M54.2 CHRONIC NECK PAIN: Chronic | ICD-10-CM

## 2021-10-18 PROCEDURE — 99214 OFFICE O/P EST MOD 30 MIN: CPT | Performed by: NURSE PRACTITIONER

## 2021-10-18 PROCEDURE — 3017F COLORECTAL CA SCREEN DOC REV: CPT | Performed by: NURSE PRACTITIONER

## 2021-10-18 PROCEDURE — G8484 FLU IMMUNIZE NO ADMIN: HCPCS | Performed by: NURSE PRACTITIONER

## 2021-10-18 PROCEDURE — G8427 DOCREV CUR MEDS BY ELIG CLIN: HCPCS | Performed by: NURSE PRACTITIONER

## 2021-10-18 PROCEDURE — G8417 CALC BMI ABV UP PARAM F/U: HCPCS | Performed by: NURSE PRACTITIONER

## 2021-10-18 PROCEDURE — 1036F TOBACCO NON-USER: CPT | Performed by: NURSE PRACTITIONER

## 2021-10-18 ASSESSMENT — ENCOUNTER SYMPTOMS
RESPIRATORY NEGATIVE: 1
COUGH: 0
BACK PAIN: 1
SHORTNESS OF BREATH: 0
CONSTIPATION: 0
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
## 2021-10-19 NOTE — TELEPHONE ENCOUNTER
Pharmacy requesting refill of Zoloft.       Medication active on med list yes      Date of last fill: 2/24/21  verified on 10/19/2021   verified by [facility] LPN      Date of last appointment 6/21/21    Next Visit Date:  1/3/2022

## 2021-11-29 ENCOUNTER — OFFICE VISIT (OUTPATIENT)
Dept: PAIN MANAGEMENT | Age: 51
End: 2021-11-29
Payer: MEDICARE

## 2021-11-29 VITALS
HEART RATE: 80 BPM | BODY MASS INDEX: 29.62 KG/M2 | DIASTOLIC BLOOD PRESSURE: 79 MMHG | WEIGHT: 200 LBS | OXYGEN SATURATION: 97 % | HEIGHT: 69 IN | SYSTOLIC BLOOD PRESSURE: 131 MMHG

## 2021-11-29 DIAGNOSIS — G89.29 CHRONIC BILATERAL LOW BACK PAIN WITH RIGHT-SIDED SCIATICA: Primary | ICD-10-CM

## 2021-11-29 DIAGNOSIS — M47.812 CERVICAL FACET SYNDROME: ICD-10-CM

## 2021-11-29 DIAGNOSIS — M51.36 DDD (DEGENERATIVE DISC DISEASE), LUMBAR: ICD-10-CM

## 2021-11-29 DIAGNOSIS — M54.41 CHRONIC BILATERAL LOW BACK PAIN WITH RIGHT-SIDED SCIATICA: Primary | ICD-10-CM

## 2021-11-29 DIAGNOSIS — G89.29 CHRONIC NECK PAIN: Chronic | ICD-10-CM

## 2021-11-29 DIAGNOSIS — M54.2 CHRONIC NECK PAIN: Chronic | ICD-10-CM

## 2021-11-29 PROCEDURE — 1036F TOBACCO NON-USER: CPT | Performed by: NURSE PRACTITIONER

## 2021-11-29 PROCEDURE — 99213 OFFICE O/P EST LOW 20 MIN: CPT | Performed by: NURSE PRACTITIONER

## 2021-11-29 PROCEDURE — G8417 CALC BMI ABV UP PARAM F/U: HCPCS | Performed by: NURSE PRACTITIONER

## 2021-11-29 PROCEDURE — G8427 DOCREV CUR MEDS BY ELIG CLIN: HCPCS | Performed by: NURSE PRACTITIONER

## 2021-11-29 PROCEDURE — G8484 FLU IMMUNIZE NO ADMIN: HCPCS | Performed by: NURSE PRACTITIONER

## 2021-11-29 PROCEDURE — 3017F COLORECTAL CA SCREEN DOC REV: CPT | Performed by: NURSE PRACTITIONER

## 2021-11-29 ASSESSMENT — ENCOUNTER SYMPTOMS
EYES NEGATIVE: 1
RESPIRATORY NEGATIVE: 1
BACK PAIN: 1
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
## 2021-12-15 ENCOUNTER — TELEPHONE (OUTPATIENT)
Dept: PAIN MANAGEMENT | Age: 51
End: 2021-12-15

## 2021-12-15 NOTE — TELEPHONE ENCOUNTER
Called pt and informed him that Dr. Kaycee Ferguson would like to start prior authorization process for SCS, however, we need psych eval report faxed over to our office from [facility].  Pt states he will call [facility] and have them fax psych eval so that we can start PA request

## 2021-12-15 NOTE — TELEPHONE ENCOUNTER
RE: SCS  Received: 5 days ago  Jolinda Landau, APRN - CNP Cleve Genet, BAL  Please notify pt that  is working on insurance approval             Previous Messages       ----- Message -----   From: Pratik Bettencourt MD   Sent: 12/10/2021  10:16 AM EST   To: Jolinda Landau, APRN - CNP   Subject: RE: SCS                                           Reviewed your last note   Seems very well documented for scs trial   I will write letter for prior auth   Thanks

## 2021-12-16 NOTE — TELEPHONE ENCOUNTER
Spoke with patient. He spoke with [employer] this morning. Spoke with [employer]. Since this is out of network, he will need to pay $100 and cannot do that until the first of January. Once he pays, patient stated he will have them fax results over to Dr. Cookie Traylor.

## 2022-01-04 RX ORDER — GABAPENTIN 600 MG/1
TABLET ORAL
Qty: 60 TABLET | Refills: 2 | Status: SHIPPED | OUTPATIENT
Start: 2022-01-04 | End: 2022-03-30 | Stop reason: SDUPTHER

## 2022-01-04 NOTE — TELEPHONE ENCOUNTER
Pharmacy requesting refill of Gabapentin.       Medication active on med list: yes      Date of last fill: 6/21/21 for #60 and 2 refills  verified on 1/4/2022    verified by Isabela Headings., LPN      Date of last appointment: 6/21/2021    Next Visit Date: 3/30/2022

## 2022-01-07 NOTE — TELEPHONE ENCOUNTER
Pharmacy requesting refill of Zoloft.       Medication active on med list yes      Date of last fill: 10/20/21  with 2 refills verified on 1/7/22  verified by NELIDA RN      Date of last appointment 6/21/21    Next Visit Date:  3/30/2022

## 2022-01-31 ENCOUNTER — TELEPHONE (OUTPATIENT)
Dept: PAIN MANAGEMENT | Age: 52
End: 2022-01-31

## 2022-01-31 NOTE — TELEPHONE ENCOUNTER
Ave Cooper notified. Please place order in [facility].  Pt is scheduled for SCS trial on 2/21/2022

## 2022-01-31 NOTE — TELEPHONE ENCOUNTER
Called patient and offered to schedule him for SCS trial on 2/7/22 at 7:30 am. Pt declined that date stating his Aunt tested positive for Covid but he tested negative. Pt requested to reschedule for 2/21/2022 arrival time of 10:00 am. Pt given prep instructions.  Please place order in [facility]

## 2022-01-31 NOTE — TELEPHONE ENCOUNTER
----- Message from Wale Ibarra MD sent at 1/28/2022  8:56 AM EST -----  Please schedule Jesenia FRASER trial 02/07 Monday at [facility] 8 am with roderick

## 2022-02-14 DIAGNOSIS — G89.29 CHRONIC BILATERAL LOW BACK PAIN WITH RIGHT-SIDED SCIATICA: Primary | ICD-10-CM

## 2022-02-14 DIAGNOSIS — M51.26 LUMBAR DISC HERNIATION: ICD-10-CM

## 2022-02-14 DIAGNOSIS — M54.41 CHRONIC BILATERAL LOW BACK PAIN WITH RIGHT-SIDED SCIATICA: Primary | ICD-10-CM

## 2022-02-21 ENCOUNTER — HOSPITAL ENCOUNTER (OUTPATIENT)
Dept: PAIN MANAGEMENT | Facility: CLINIC | Age: 52
Discharge: HOME | End: 2022-02-21
Payer: MEDICARE

## 2022-02-21 VITALS
TEMPERATURE: 97 F | SYSTOLIC BLOOD PRESSURE: 121 MMHG | HEIGHT: 69 IN | OXYGEN SATURATION: 95 % | RESPIRATION RATE: 17 BRPM | DIASTOLIC BLOOD PRESSURE: 66 MMHG | HEART RATE: 67 BPM | BODY MASS INDEX: 29.62 KG/M2 | WEIGHT: 200 LBS

## 2022-02-21 DIAGNOSIS — Z98.1 HX OF FUSION OF CERVICAL SPINE: ICD-10-CM

## 2022-02-21 DIAGNOSIS — R52 PAIN MANAGEMENT: ICD-10-CM

## 2022-02-21 DIAGNOSIS — M54.2 CHRONIC NECK PAIN: Primary | Chronic | ICD-10-CM

## 2022-02-21 DIAGNOSIS — G89.29 CHRONIC NECK PAIN: Primary | Chronic | ICD-10-CM

## 2022-02-21 DIAGNOSIS — M54.41 CHRONIC BILATERAL LOW BACK PAIN WITH RIGHT-SIDED SCIATICA: ICD-10-CM

## 2022-02-21 DIAGNOSIS — G89.29 CHRONIC BILATERAL LOW BACK PAIN WITH RIGHT-SIDED SCIATICA: ICD-10-CM

## 2022-02-21 PROCEDURE — 99152 MOD SED SAME PHYS/QHP 5/>YRS: CPT | Performed by: ANESTHESIOLOGY

## 2022-02-21 PROCEDURE — 2500000003 HC RX 250 WO HCPCS: Performed by: ANESTHESIOLOGY

## 2022-02-21 PROCEDURE — 2580000003 HC RX 258: Performed by: ANESTHESIOLOGY

## 2022-02-21 PROCEDURE — 6360000002 HC RX W HCPCS: Performed by: ANESTHESIOLOGY

## 2022-02-21 PROCEDURE — 99153 MOD SED SAME PHYS/QHP EA: CPT | Performed by: ANESTHESIOLOGY

## 2022-02-21 PROCEDURE — 63650 IMPLANT NEUROELECTRODES: CPT

## 2022-02-21 PROCEDURE — C1778 LEAD, NEUROSTIMULATOR: HCPCS

## 2022-02-21 PROCEDURE — 63650 IMPLANT NEUROELECTRODES: CPT | Performed by: ANESTHESIOLOGY

## 2022-02-21 RX ORDER — MIDAZOLAM HYDROCHLORIDE 2 MG/2ML
INJECTION, SOLUTION INTRAMUSCULAR; INTRAVENOUS
Status: COMPLETED | OUTPATIENT
Start: 2022-02-21 | End: 2022-02-21

## 2022-02-21 RX ORDER — BUPIVACAINE HYDROCHLORIDE 5 MG/ML
INJECTION, SOLUTION EPIDURAL; INTRACAUDAL
Status: COMPLETED | OUTPATIENT
Start: 2022-02-21 | End: 2022-02-21

## 2022-02-21 RX ORDER — LIDOCAINE HYDROCHLORIDE 10 MG/ML
INJECTION, SOLUTION EPIDURAL; INFILTRATION; INTRACAUDAL; PERINEURAL
Status: COMPLETED | OUTPATIENT
Start: 2022-02-21 | End: 2022-02-21

## 2022-02-21 RX ORDER — SODIUM CHLORIDE 9 MG/ML
INJECTION, SOLUTION INTRAVENOUS CONTINUOUS PRN
Status: COMPLETED | OUTPATIENT
Start: 2022-02-21 | End: 2022-02-21

## 2022-02-21 RX ORDER — CLINDAMYCIN PHOSPHATE 900 MG/50ML
900 INJECTION INTRAVENOUS
Status: DISCONTINUED | OUTPATIENT
Start: 2022-02-21 | End: 2022-02-21 | Stop reason: HOSPADM

## 2022-02-21 RX ORDER — CLINDAMYCIN PHOSPHATE 900 MG/50ML
INJECTION INTRAVENOUS CONTINUOUS PRN
Status: COMPLETED | OUTPATIENT
Start: 2022-02-21 | End: 2022-02-21

## 2022-02-21 RX ORDER — FENTANYL CITRATE 50 UG/ML
INJECTION, SOLUTION INTRAMUSCULAR; INTRAVENOUS
Status: COMPLETED | OUTPATIENT
Start: 2022-02-21 | End: 2022-02-21

## 2022-02-21 RX ADMIN — FENTANYL CITRATE 50 MCG: 50 INJECTION INTRAMUSCULAR; INTRAVENOUS at 11:26

## 2022-02-21 RX ADMIN — BUPIVACAINE HYDROCHLORIDE 20 ML: 5 INJECTION, SOLUTION EPIDURAL; INTRACAUDAL; PERINEURAL at 11:31

## 2022-02-21 RX ADMIN — MIDAZOLAM HYDROCHLORIDE 2 MG: 1 INJECTION, SOLUTION INTRAMUSCULAR; INTRAVENOUS at 11:26

## 2022-02-21 RX ADMIN — SODIUM CHLORIDE 500 ML: 0.9 INJECTION, SOLUTION INTRAVENOUS at 11:24

## 2022-02-21 RX ADMIN — CLINDAMYCIN IN 5 PERCENT DEXTROSE 900 MG: 18 INJECTION, SOLUTION INTRAVENOUS at 11:27

## 2022-02-21 ASSESSMENT — PAIN - FUNCTIONAL ASSESSMENT
PAIN_FUNCTIONAL_ASSESSMENT: 0-10
PAIN_FUNCTIONAL_ASSESSMENT: PREVENTS OR INTERFERES WITH MANY ACTIVE NOT PASSIVE ACTIVITIES
PAIN_FUNCTIONAL_ASSESSMENT: 0-10

## 2022-02-21 ASSESSMENT — PAIN DESCRIPTION - DESCRIPTORS: DESCRIPTORS: ACHING;SHOOTING
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
## 2022-02-21 NOTE — OP NOTE
PREOPERATIVE DIAGNOSES:   1. Chronic neck pain    2. Hx of fusion of cervical spine    3. Chronic bilateral low back pain with right-sided sciatica        POSTOPERATIVE DIAGNOSES:   1. Chronic neck pain    2. Hx of fusion of cervical spine    3. Chronic bilateral low back pain with right-sided sciatica          PROCEDURE PERFORMED:  1. Placement of Cervical and Thoracic Dorsal epidural leads via percuatneous entry at T3/4 AND L1/2 respectively  2. Complex programming of the stimulator device with Tonic paresthesia and Non paresthesia parameters. ANESTHESIA:  IV sedation with versed and fentanyl    BLOOD LOSS:  Minimal    INTRAVENOUS FLUID:  Recorded in the anesthesia chart. ANTIBIOTICS:  clindamycin 900 IV     OPERATIVE NOTE:    The patient was seen in the preoperative area. Chart was reviewed. Informed  consent was obtained. The patient was taken to the procedure room and was placed in prone position. All pressure points were padded. The area over the thoracic and lumbar spine was prepped with Betadine and DuraPrep. Complete sterile technique for prep and drape was utilized. A timeout was completed prior to the start of the procedure. Antibiotic was administered prior to the start of the procedure. AP view was taken and L3 pedicles were marked on each side. Then, a 14-gauge Tuohy  epidural needle was advanced from right  pedicle in a paramedian fashion to cotact the lamina at L1/2 level. Epidural space entry was at L1/2 Interspace. Epidural space was identified with loss of resistance to air. Position was confirmed  in lateral view. Then, a 8 Contact NEVRO SCS leadwas advanced with live fluoroscopy in AP view to the superior endplate of T8 vertebral body. Position was confirmed in lateral view to be in the dorsal epidural space. Similarly, a 2nd lead was placed from the RIGHT side  into the epidural space  AP view was taken and T5 pedicles were marked on each side.  Then, a 14-gauge Tuohy  epidural needle was advanced from right  pedicle in a paramedian fashion to cotact the lamina at T3 level. Epidural space entry was at L1/2 Interspace. Epidural space was identified with loss of resistance to air. Position was confirmed  in lateral view. Then, a 8 Contact NEVRO SCS leadwas advanced with live fluoroscopy in AP view to the superior endplate of C2 vertebral body. Position was confirmed in lateral view to be in the dorsal epidural space. The leads were then tested for impedance which was wnl. Stimulator testing was then performed and leads positions were optimized to obtain optimal paresthesia coverage over the pain areas. The stylets and needles were then removed with live fluoroscopy. Leads were secured  with CHLORHEXIDINE Tegaderm and coverall. The patient was then turned into supine position and was taken to the recovery area   where she was monitored for appropriate period of time before being discharged home   in stable condition. Detailed discharge instructions were provided to the patient in the PACU to  avoid any bending, twisting, turning and any exaggerated spine movements. She  is instructed to keep the area dry. We will plan to follow up with her on phone to assess trial response    SEDATION NOTE:    ASA CLASSIFICATION  3  MP   CLASSIFICATION  3    Moderate intravenous conscious sedation was supervised by Dr. Agnieszka Laureano  The patient was independently monitored by a Registered Nurse assigned to the Procedure Room  Monitoring included automated blood pressure, continuous EKG, Capnography and continuous pulse oximetry. The detailed Conscious Record is permanently stored in the [place] [address].      The following is the conscious sedation record;  Start Time:  1124  End times:  1249  Duration:   85 MINUTES  MEDS GIVEN 2 MG VERSED AND 50 MCG FENTANYL

## 2022-02-22 ENCOUNTER — TELEPHONE (OUTPATIENT)
Dept: PAIN MANAGEMENT | Age: 52
End: 2022-02-22

## 2022-02-22 NOTE — TELEPHONE ENCOUNTER
Patient states some soreness after procedure but overall states he feels \" Ok\". Patient denies any adverse side effects. No, fever, redness, swelling, no numbness or tingling in legs or back. Does have a little itching with can be attributed to his Band-Aid and or dressing. Rates his pain at a 5 out 10. States he has a follow up appointment he will keep. He will call us if he has any issues or concerns.

## 2022-02-28 ENCOUNTER — OFFICE VISIT (OUTPATIENT)
Dept: PAIN MANAGEMENT | Age: 52
End: 2022-02-28
Payer: MEDICARE

## 2022-02-28 VITALS
WEIGHT: 217.8 LBS | OXYGEN SATURATION: 96 % | DIASTOLIC BLOOD PRESSURE: 85 MMHG | SYSTOLIC BLOOD PRESSURE: 126 MMHG | BODY MASS INDEX: 32.26 KG/M2 | HEIGHT: 69 IN | HEART RATE: 84 BPM

## 2022-02-28 DIAGNOSIS — M54.2 CHRONIC NECK PAIN: Primary | Chronic | ICD-10-CM

## 2022-02-28 DIAGNOSIS — M54.41 CHRONIC BILATERAL LOW BACK PAIN WITH RIGHT-SIDED SCIATICA: ICD-10-CM

## 2022-02-28 DIAGNOSIS — G89.29 CHRONIC BILATERAL LOW BACK PAIN WITH RIGHT-SIDED SCIATICA: ICD-10-CM

## 2022-02-28 DIAGNOSIS — M47.812 CERVICAL FACET SYNDROME: ICD-10-CM

## 2022-02-28 DIAGNOSIS — G89.29 CHRONIC NECK PAIN: Primary | Chronic | ICD-10-CM

## 2022-02-28 PROCEDURE — G8427 DOCREV CUR MEDS BY ELIG CLIN: HCPCS | Performed by: NURSE PRACTITIONER

## 2022-02-28 PROCEDURE — 3017F COLORECTAL CA SCREEN DOC REV: CPT | Performed by: NURSE PRACTITIONER

## 2022-02-28 PROCEDURE — 99214 OFFICE O/P EST MOD 30 MIN: CPT | Performed by: NURSE PRACTITIONER

## 2022-02-28 PROCEDURE — G8417 CALC BMI ABV UP PARAM F/U: HCPCS | Performed by: NURSE PRACTITIONER

## 2022-02-28 PROCEDURE — G8484 FLU IMMUNIZE NO ADMIN: HCPCS | Performed by: NURSE PRACTITIONER

## 2022-02-28 PROCEDURE — 1036F TOBACCO NON-USER: CPT | Performed by: NURSE PRACTITIONER

## 2022-02-28 ASSESSMENT — ENCOUNTER SYMPTOMS
SORE THROAT: 0
SHORTNESS OF BREATH: 0
BLURRED VISION: 0
BACK PAIN: 1
DOUBLE VISION: 0
EYE PAIN: 0
COUGH: 0
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
## 2022-03-17 ENCOUNTER — TELEPHONE (OUTPATIENT)
Dept: PAIN MANAGEMENT | Age: 52
End: 2022-03-17

## 2022-03-17 ENCOUNTER — TELEPHONE (OUTPATIENT)
Dept: FAMILY MEDICINE CLINIC | Age: 52
End: 2022-03-17

## 2022-03-17 NOTE — TELEPHONE ENCOUNTER
----- Message from Yan Hatch sent at 3/17/2022  9:12 AM EDT -----  Subject: Appointment Request    Reason for Call: Urgent Joint Pain    QUESTIONS  Type of Appointment? Established Patient  Reason for appointment request? No appointments available during search  Additional Information for Provider? pt called in stating that he was   having severe hip pain all the way down his leg. this started Tuesday and   has gotten worse each day.  ---------------------------------------------------------------------------  --------------  CALL BACK INFO  What is the best way for the office to contact you? OK to leave message on   voicemail  Preferred Call Back Phone Number? [phone]  ---------------------------------------------------------------------------  --------------  SCRIPT ANSWERS  Relationship to Patient? Self  Did you have an injury or trauma within the past 3 days? No  Is your joint red or swollen? No  Are you having new onset numbness, tingling, or weakness with the pain? No  Did you have an injury or trauma within the past 14 days? No  Did your pain begin within the past week? Yes  Have you been diagnosed with, awaiting test results for, or told that you   are suspected of having COVID-19 (Coronavirus)? (If patient has tested   negative or was tested as a requirement for work, school, or travel and   not based on symptoms, answer no)? No  Within the past 10 days have you developed any of the following symptoms   (answer no if symptoms have been present longer than 10 days or began   more than 10 days ago)? Fever or Chills, Cough, Shortness of breath or   difficulty breathing, Loss of taste or smell, Sore throat, Nasal   congestion, Sneezing or runny nose, Fatigue or generalized body aches   (answer no if pain is specific to a body part e.g. back pain), Diarrhea,   Headache? No  Have you had close contact with someone with COVID-19 in the last 7 days?    No  (Service Expert  click yes below to proceed with Marlene Randall As Usual   Scheduling)?  Yes

## 2022-03-17 NOTE — TELEPHONE ENCOUNTER
Patient is reporting pain in R hip and leg. Pt takes gabapentin. Patient asked about OTC medication/ creams: advised OTC meds and creams were available but would need to take as directed by bottle. From what I found there were no interactions and advised that this was also addressed with pharmacist as patient was unsure what he was going to get.

## 2022-03-17 NOTE — TELEPHONE ENCOUNTER
Pt stated he has increase in hip pain tried to book appointment did not want one advised er he said he will probably go there

## 2022-03-18 ENCOUNTER — TELEMEDICINE (OUTPATIENT)
Dept: FAMILY MEDICINE CLINIC | Age: 52
End: 2022-03-18
Payer: MEDICARE

## 2022-03-18 DIAGNOSIS — M54.50 RIGHT LUMBAR PAIN: Primary | ICD-10-CM

## 2022-03-18 PROCEDURE — 99441 PR PHYS/QHP TELEPHONE EVALUATION 5-10 MIN: CPT | Performed by: FAMILY MEDICINE

## 2022-03-18 NOTE — PROGRESS NOTES
No chief complaint on file. Video Visit    Consent: patient has previously opted for and currently agrees to a TeleHealth encounter, in place of a face-to-face ambulatory visit and is informed that the encounter will be billed as such accordingly. Location:     Patient / off site location: [place]   - Location: home     Physician: Espinoza Torres. Christelle Galdamez DO - Location: [place]    Length of time: 10 minutes                  Concerns:  Tuesday am - awoke with hip and right thigh pain, 2 days, barely able to sit / walk. Called Dr. Moise Morillo [facility] [employer] to help since its not related to original pain complaint\"  Unable to sleep at night, looked up sciatic nerve. Unable to come to the office as he is taking care of the [address]. All right sided. There were no vitals taken for this visit. Review of Systems      Physical Exam      ASSESSMENT AND PLAN     Patient needs to be seen F2F and he agrees to consider first available this afternoon at [facility] office. Physical exam is needed and minimum criteria.       Electronicallysigned by Roberto Rodgers DO on 3/18/2022 at 1:23 PM

## 2022-03-25 NOTE — TELEPHONE ENCOUNTER
Pt is waiting for appt due to seeing Neurology on 3/30. Pt will call and let office know how he is doing.

## 2022-03-30 ENCOUNTER — TELEMEDICINE (OUTPATIENT)
Dept: NEUROLOGY | Age: 52
End: 2022-03-30
Payer: MEDICARE

## 2022-03-30 DIAGNOSIS — M54.2 CHRONIC NECK PAIN: ICD-10-CM

## 2022-03-30 DIAGNOSIS — G89.29 CHRONIC LOW BACK PAIN WITH SCIATICA, SCIATICA LATERALITY UNSPECIFIED, UNSPECIFIED BACK PAIN LATERALITY: Primary | ICD-10-CM

## 2022-03-30 DIAGNOSIS — F07.81 POST CONCUSSIVE SYNDROME: ICD-10-CM

## 2022-03-30 DIAGNOSIS — M54.40 CHRONIC LOW BACK PAIN WITH SCIATICA, SCIATICA LATERALITY UNSPECIFIED, UNSPECIFIED BACK PAIN LATERALITY: Primary | ICD-10-CM

## 2022-03-30 DIAGNOSIS — M54.16 LUMBAR RADICULOPATHY: ICD-10-CM

## 2022-03-30 DIAGNOSIS — G89.29 CHRONIC NECK PAIN: ICD-10-CM

## 2022-03-30 PROCEDURE — G8428 CUR MEDS NOT DOCUMENT: HCPCS | Performed by: PSYCHIATRY & NEUROLOGY

## 2022-03-30 PROCEDURE — G8484 FLU IMMUNIZE NO ADMIN: HCPCS | Performed by: PSYCHIATRY & NEUROLOGY

## 2022-03-30 PROCEDURE — 1036F TOBACCO NON-USER: CPT | Performed by: PSYCHIATRY & NEUROLOGY

## 2022-03-30 PROCEDURE — 3017F COLORECTAL CA SCREEN DOC REV: CPT | Performed by: PSYCHIATRY & NEUROLOGY

## 2022-03-30 PROCEDURE — G8417 CALC BMI ABV UP PARAM F/U: HCPCS | Performed by: PSYCHIATRY & NEUROLOGY

## 2022-03-30 PROCEDURE — 99214 OFFICE O/P EST MOD 30 MIN: CPT | Performed by: PSYCHIATRY & NEUROLOGY

## 2022-03-30 RX ORDER — IBUPROFEN 800 MG/1
TABLET ORAL
Qty: 90 TABLET | Refills: 3 | Status: SHIPPED | OUTPATIENT
Start: 2022-03-30 | End: 2022-07-25 | Stop reason: SDUPTHER

## 2022-03-30 RX ORDER — GABAPENTIN 600 MG/1
TABLET ORAL
Qty: 60 TABLET | Refills: 2 | Status: SHIPPED | OUTPATIENT
Start: 2022-03-30 | End: 2022-07-25 | Stop reason: SDUPTHER

## 2022-03-30 ASSESSMENT — ENCOUNTER SYMPTOMS
BACK PAIN: 1
GASTROINTESTINAL NEGATIVE: 1
COUGH: 1
ALLERGIC/IMMUNOLOGIC NEGATIVE: 1
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
## 2022-04-07 ENCOUNTER — TELEPHONE (OUTPATIENT)
Dept: PAIN MANAGEMENT | Age: 52
End: 2022-04-07

## 2022-04-29 NOTE — TELEPHONE ENCOUNTER
Pharmacy requesting refill of Zoloft 50mg.       Medication active on med list yes      Date of last Rx: 01/07/22 with 2 refills          verified by LAUREL MONTANA      Date of last appointment 03/30/22    Next Visit Date:  8/3/2022

## 2022-05-06 RX ORDER — GABAPENTIN 600 MG/1
TABLET ORAL
Qty: 60 TABLET | Refills: 2 | OUTPATIENT
Start: 2022-05-06

## 2022-05-27 ENCOUNTER — HOSPITAL ENCOUNTER (OUTPATIENT)
Dept: MRI IMAGING | Age: 52
Discharge: HOME | End: 2022-05-29
Payer: MEDICARE

## 2022-05-27 DIAGNOSIS — G89.29 CHRONIC LOW BACK PAIN WITH SCIATICA, SCIATICA LATERALITY UNSPECIFIED, UNSPECIFIED BACK PAIN LATERALITY: ICD-10-CM

## 2022-05-27 DIAGNOSIS — M54.40 CHRONIC LOW BACK PAIN WITH SCIATICA, SCIATICA LATERALITY UNSPECIFIED, UNSPECIFIED BACK PAIN LATERALITY: ICD-10-CM

## 2022-05-27 PROCEDURE — 72148 MRI LUMBAR SPINE W/O DYE: CPT

## 2022-06-03 ENCOUNTER — OFFICE VISIT (OUTPATIENT)
Dept: NEUROSURGERY | Age: 52
End: 2022-06-03
Payer: MEDICARE

## 2022-06-03 VITALS
HEIGHT: 69 IN | DIASTOLIC BLOOD PRESSURE: 75 MMHG | BODY MASS INDEX: 31.84 KG/M2 | OXYGEN SATURATION: 100 % | WEIGHT: 215 LBS | SYSTOLIC BLOOD PRESSURE: 123 MMHG | HEART RATE: 59 BPM

## 2022-06-03 DIAGNOSIS — M54.16 LUMBAR RADICULOPATHY: Primary | ICD-10-CM

## 2022-06-03 PROCEDURE — 1036F TOBACCO NON-USER: CPT | Performed by: NURSE PRACTITIONER

## 2022-06-03 PROCEDURE — G8427 DOCREV CUR MEDS BY ELIG CLIN: HCPCS | Performed by: NURSE PRACTITIONER

## 2022-06-03 PROCEDURE — 99203 OFFICE O/P NEW LOW 30 MIN: CPT | Performed by: NURSE PRACTITIONER

## 2022-06-03 PROCEDURE — 3017F COLORECTAL CA SCREEN DOC REV: CPT | Performed by: NURSE PRACTITIONER

## 2022-06-03 PROCEDURE — G8417 CALC BMI ABV UP PARAM F/U: HCPCS | Performed by: NURSE PRACTITIONER
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
## 2022-06-14 NOTE — FLOWSHEET NOTE
[x] [facility] ([facility]) - [facility] &  Therapy  [address]    [phone]  F: [phone]   [] [address]  [facility] [address]   [address]  P: [phone]  F: [phone]  [] [address] &  Therapy  [address]  P: [phone]  F: [phone] [] [address]  P: [phone]  F: [phone]  [] [address]  [facility]   [address]   [place]: [phone]  F: [phone]   [] [place] [id number] [facility] [address]  [place]: [phone]   F: [phone]     Physical Therapy Cancel/No Show note    Date: 2022  Patient: Lucho Morales  : 1970  MRN: [id number]    Cancels/No Shows to date:     For today's appointment patient:    []  Cancelled    [x] Rescheduled appointment    [] No-show     Reason given by patient:    []  Patient ill    []  Conflicting appointment    [] No transportation      [] Conflict with work    [] No reason given    [] Weather related    [] [id number]    [x] Other:      Comments:  Changed appt to 22      [] Next appointment was confirmed    Electronically signed by: Augie Merchant, PT

## 2022-06-15 ENCOUNTER — HOSPITAL ENCOUNTER (OUTPATIENT)
Dept: PHYSICAL THERAPY | Age: 52
Setting detail: THERAPIES SERIES
Discharge: HOME | End: 2022-06-15
Payer: MEDICARE

## 2022-06-17 ENCOUNTER — HOSPITAL ENCOUNTER (OUTPATIENT)
Dept: PHYSICAL THERAPY | Age: 52
Setting detail: THERAPIES SERIES
Discharge: HOME | End: 2022-06-17
Payer: MEDICARE

## 2022-06-17 PROCEDURE — 97161 PT EVAL LOW COMPLEX 20 MIN: CPT
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
## 2022-07-01 ENCOUNTER — HOSPITAL ENCOUNTER (OUTPATIENT)
Dept: PHYSICAL THERAPY | Age: 52
Setting detail: THERAPIES SERIES
Discharge: HOME | End: 2022-07-01
Payer: MEDICARE

## 2022-07-01 PROCEDURE — 97110 THERAPEUTIC EXERCISES: CPT

## 2022-07-01 PROCEDURE — 97012 MECHANICAL TRACTION THERAPY: CPT

## 2022-07-01 NOTE — FLOWSHEET NOTE
[x] [facility]) [facility] &  Therapy  [address]  [phone]  F: [phone] [] [address]  [place] [address]   [address]  P: [phone]  F: [phone] [] [address]  [address]  P: [phone]  F: [phone] [] [address]  P: [phone]  F: [phone] [] [address]  [facility]   [address]   [place]: [phone]  F: [phone]      Physical Therapy Daily Treatment Note    Date:  2022  Patient Name:  Laury Fraser    :  1970  MRN: [id number]  Physician: AYLEEN Pavon CNP                  Insurance: [employer]   Medical Diagnosis: Lumbar radiculopathy (M54.16 [ICD-10-CM])                Rehab Codes: M54.9, M79.604, R20.2, M25.60,   Onset Date: 3/17/22                                 Next 's appt.: 22   Visits: 2/   Cancels/No Shows: 0/1    Subjective:    Pain:  [x] Yes  [] No Location: Low back, R LE Pain Rating: (0-10 scale) 4-5/10  Pain altered Tx:  [x] No  [] Yes  Action:  Comments: States pain is 4-5/10 upon arrival this date, noting R LE is still numbness/tingling. Objective:  Modalities: Traction lumbar supine 3 steps, max 80 lbs, min 20 lbs, 50% pull speed, 60 sec on, 10 sec off for 15 minutes after exercises.   Precautions:   Exercises:  Exercise Reps/ Time Weight/ Level Comments   Nustep 5 mins L3          Seated      Hamstring stretch 3x 20 sec stool   Marching 10x           Supine      LTR 10x 5 sec    Marching 10x     Bridges 10x     SLR 10x     Clamshells 10x  One side at a time   SKTC 3x 10 sec towel   Piriformis stretch 3x 10 sec Leg cross over opposite leg, knee to opposite shoulder                     Other:      Treatment Charges: Mins Units   [] Modalities     [x]  Ther Exercise 29 2   []  Manual Therapy     []  Ther Activities     []  Aquatics     []  Vasocompression     [x]  Other-traction 15 1   Total Treatment time 44 mins        Assessment: [x] Progressing toward goals. Initiated exercises per log this date to increase lumbar ROM and strength with fair tolerance. Tightness noted on right leg with stretching exercises. Trial lumbar traction supine to decrease pain. Patient noted feeling a little stiff after traction. [] No change. [] Other:  [x] Patient would continue to benefit from skilled physical therapy services in order to: address low back and right hip pain; R LE numbness; limited lumbar mobility and R hip mobility; core weakness and R LE weakness; functional impairments including difficulty sitting, standing, and walking for longer periods of time. STG: (to be met in 8 treatments)  1. ? Pain: Decrease low back and R LE pain to 5/10  2. ? ROM: Increase lumbar ROM to 25% limited all motions  3. ? Strength: Increase core strength as demonstrated by progression of DLS exercises  4. ? Function: Improve Oswestry to 56% impairment  5. Patient to be independent with home exercise program as demonstrated by performance with correct form without cues. 6. Demonstrate Knowledge of fall prevention  LTG: (to be met in 12 treatments)  1. Pt will be able to walk a half mile without increasing pain  2. Pt will be able to sit for more than a half hour  3. Improve Oswestry to 46% impairment        Patient goals: To get better. Pt. Education:  [x] Yes  [] No  [x] Reviewed Prior HEP/Ed  Method of Education: [x] Verbal  [x] Demo  [] Written  Comprehension of Education:  [x] Verbalizes understanding. [x] Demonstrates understanding. [x] Needs review. [] Demonstrates/verbalizes HEP/Ed previously given. Plan: [x] Continue current frequency toward long and short term goals.     [x] Specific Instructions for subsequent treatments: continue DLS exercises, R hip stretches; check patient response to treatment      Time In: 10:00 am           Time Out: 10:50 am    Electronically signed by:  Daija Sykes PTA

## 2022-07-05 ENCOUNTER — OFFICE VISIT (OUTPATIENT)
Dept: PAIN MANAGEMENT | Age: 52
End: 2022-07-05
Payer: MEDICARE

## 2022-07-05 VITALS
BODY MASS INDEX: 31.25 KG/M2 | OXYGEN SATURATION: 97 % | HEART RATE: 87 BPM | HEIGHT: 69 IN | WEIGHT: 211 LBS | DIASTOLIC BLOOD PRESSURE: 74 MMHG | SYSTOLIC BLOOD PRESSURE: 124 MMHG

## 2022-07-05 DIAGNOSIS — M54.2 CHRONIC NECK PAIN: Chronic | ICD-10-CM

## 2022-07-05 DIAGNOSIS — R93.7 ABNORMAL MRI, LUMBAR SPINE: Primary | ICD-10-CM

## 2022-07-05 DIAGNOSIS — G89.29 CHRONIC NECK PAIN: Chronic | ICD-10-CM

## 2022-07-05 DIAGNOSIS — G89.29 CHRONIC BILATERAL LOW BACK PAIN WITH RIGHT-SIDED SCIATICA: ICD-10-CM

## 2022-07-05 DIAGNOSIS — M54.41 CHRONIC BILATERAL LOW BACK PAIN WITH RIGHT-SIDED SCIATICA: ICD-10-CM

## 2022-07-05 PROCEDURE — 99213 OFFICE O/P EST LOW 20 MIN: CPT | Performed by: ANESTHESIOLOGY

## 2022-07-05 PROCEDURE — 1036F TOBACCO NON-USER: CPT | Performed by: ANESTHESIOLOGY

## 2022-07-05 PROCEDURE — G8417 CALC BMI ABV UP PARAM F/U: HCPCS | Performed by: ANESTHESIOLOGY

## 2022-07-05 PROCEDURE — G8427 DOCREV CUR MEDS BY ELIG CLIN: HCPCS | Performed by: ANESTHESIOLOGY

## 2022-07-05 PROCEDURE — 3017F COLORECTAL CA SCREEN DOC REV: CPT | Performed by: ANESTHESIOLOGY

## 2022-07-05 ASSESSMENT — ENCOUNTER SYMPTOMS
VOMITING: 0
ABDOMINAL PAIN: 0
NAUSEA: 0
CONSTIPATION: 0
BACK PAIN: 1
SHORTNESS OF BREATH: 1
COUGH: 0
DIARRHEA: 0
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
## 2022-07-07 ENCOUNTER — HOSPITAL ENCOUNTER (OUTPATIENT)
Dept: PHYSICAL THERAPY | Age: 52
Setting detail: THERAPIES SERIES
Discharge: HOME | End: 2022-07-07
Payer: MEDICARE

## 2022-07-07 PROCEDURE — 97110 THERAPEUTIC EXERCISES: CPT

## 2022-07-07 NOTE — FLOWSHEET NOTE
[x] [facility] &  Therapy  [address]  [phone]  F: [phone]     Physical Therapy Daily Treatment Note    Date:  2022  Patient Name:  Dexter Osman    :  1970  MRN: [id number]  Physician: AYLEEN Banda CNP                  Insurance: [employer]AGE   Medical Diagnosis: Lumbar radiculopathy (M54.16 [ICD-10-CM])                Rehab Codes: M54.9, M79.604, R20.2, M25.60,   Onset Date: 3/17/22                                 Next 's appt.: 22   Visits: 3/12   Cancels/No Shows: 0/1    Subjective:    Pain:  [x] Yes  [] No Location: Low back, R LE Pain Rating: (0-10 scale) 5-6/10  Pain altered Tx:  [x] No  [] Yes  Action:  Comments: Patient arrives today stating he is having some pain in the right leg. States that he did not feel good after last treatment session. States he did not think traction helped.      Objective:  Modalities: Traction lumbar supine 3 steps, max 80 lbs, min 20 lbs, 50% pull speed, 60 sec on, 10 sec off for 15 minutes after - HELD 22   Precautions:   Exercises:  Exercise Reps/ Time Weight/ Level Comments Completed  22   Nustep 5 mins L3  x          Seated       Hamstring stretch 3x 20 sec stool    Marching 10x      Lumbar flexion stretch 10x5\" ea B sides/ flexion Red pball x   Seated piriformis stretch 2x30\"   x                 Supine       LTR 10x ea 5 sec  x   Marching 10x      Bridges 15x  With ball squeeze x   SLR 15x   x   Clamshells 15x  Sidelying x   SKTC 3x 10 sec towel x   Piriformis stretch 3x 10 sec Leg cross over opposite leg, knee to opposite shoulder    Supine HS Stretch 3x30\" ea   x   Dead bugs                     Standing       4-way hip 10x ea lime  x   Step ups 10x ea Fwd/lat 6\"  x   Med ball push outs and rotations 10x ea 10#  x   Other:      Treatment Charges: Mins Units   []  Modalities     [x]  Ther Exercise 39 3   []  Manual Therapy     []  Ther Activities     []  Aquatics []  Vasocompression     []  Other-traction     Total Treatment time 39 3       Assessment: [x] Progressing toward goals. Initiated session on NuStep in order to improve peripheral blood flow and prepare for stretching and strengthening. Continued with seated pball flexion and supine HS stretching in order to improve posterior chain soft tissue extensibility. Progressed all strength exercises on this date with patient demonstrating good tolerance to all exercises. Notes fatigue but able to complete with proper technique. Added standing strengthening exercises on this date in order to continue progressing and overloading muscle to build strength. Patient reports slight low back pain with dead bugs and demonstrates increased lumbar lordosis and decreased core engagement throughout exercise. [] No change. [] Other:  [x] Patient would continue to benefit from skilled physical therapy services in order to: address low back and right hip pain; R LE numbness; limited lumbar mobility and R hip mobility; core weakness and R LE weakness; functional impairments including difficulty sitting, standing, and walking for longer periods of time. STG: (to be met in 8 treatments)  1. ? Pain: Decrease low back and R LE pain to 5/10  2. ? ROM: Increase lumbar ROM to 25% limited all motions  3. ? Strength: Increase core strength as demonstrated by progression of DLS exercises  4. ? Function: Improve Oswestry to 56% impairment  5. Patient to be independent with home exercise program as demonstrated by performance with correct form without cues. 6. Demonstrate Knowledge of fall prevention  LTG: (to be met in 12 treatments)  1. Pt will be able to walk a half mile without increasing pain  2. Pt will be able to sit for more than a half hour  3. Improve Oswestry to 46% impairment    Patient goals: To get better.       Pt. Education:  [x] Yes  [] No  [x] Reviewed Prior HEP/Ed  Method of Education: [x] Verbal  [x] Demo  [] Written  Comprehension of Education:  [x] Verbalizes understanding. [x] Demonstrates understanding. [x] Needs review. [] Demonstrates/verbalizes HEP/Ed previously given. Plan: [x] Continue current frequency toward long and short term goals.     [x] Specific Instructions for subsequent treatments: continue DLS exercises, R hip stretches; check patient response to treatment      Time In: 12:00pm           Time Out: 12:44 pm     Electronically signed by:  Everett Huffman PT

## 2022-07-08 ENCOUNTER — OFFICE VISIT (OUTPATIENT)
Dept: FAMILY MEDICINE CLINIC | Age: 52
End: 2022-07-08
Payer: MEDICARE

## 2022-07-08 VITALS
HEIGHT: 69 IN | SYSTOLIC BLOOD PRESSURE: 110 MMHG | DIASTOLIC BLOOD PRESSURE: 64 MMHG | TEMPERATURE: 97.2 F | HEART RATE: 70 BPM | BODY MASS INDEX: 31.28 KG/M2 | WEIGHT: 211.2 LBS

## 2022-07-08 DIAGNOSIS — M51.26 LUMBAR DISC HERNIATION: ICD-10-CM

## 2022-07-08 DIAGNOSIS — J40 BRONCHITIS DUE TO TOBACCO USE: ICD-10-CM

## 2022-07-08 DIAGNOSIS — Z72.0 BRONCHITIS DUE TO TOBACCO USE: ICD-10-CM

## 2022-07-08 DIAGNOSIS — Z87.828 HISTORY OF MOTOR VEHICLE ACCIDENT: Primary | ICD-10-CM

## 2022-07-08 PROCEDURE — 3017F COLORECTAL CA SCREEN DOC REV: CPT | Performed by: FAMILY MEDICINE

## 2022-07-08 PROCEDURE — 4004F PT TOBACCO SCREEN RCVD TLK: CPT | Performed by: FAMILY MEDICINE

## 2022-07-08 PROCEDURE — G8427 DOCREV CUR MEDS BY ELIG CLIN: HCPCS | Performed by: FAMILY MEDICINE

## 2022-07-08 PROCEDURE — G8417 CALC BMI ABV UP PARAM F/U: HCPCS | Performed by: FAMILY MEDICINE

## 2022-07-08 PROCEDURE — 99213 OFFICE O/P EST LOW 20 MIN: CPT | Performed by: FAMILY MEDICINE

## 2022-07-08 RX ORDER — ALBUTEROL SULFATE 90 UG/1
2 AEROSOL, METERED RESPIRATORY (INHALATION) EVERY 6 HOURS PRN
Qty: 18 G | Refills: 3 | Status: SHIPPED | OUTPATIENT
Start: 2022-07-08

## 2022-07-08 ASSESSMENT — PATIENT HEALTH QUESTIONNAIRE - PHQ9
2. FEELING DOWN, DEPRESSED OR HOPELESS: 0
1. LITTLE INTEREST OR PLEASURE IN DOING THINGS: 0
SUM OF ALL RESPONSES TO PHQ QUESTIONS 1-9: 0
SUM OF ALL RESPONSES TO PHQ9 QUESTIONS 1 & 2: 0
SUM OF ALL RESPONSES TO PHQ QUESTIONS 1-9: 0

## 2022-07-08 NOTE — PATIENT INSTRUCTIONS
Thank you for letting us take care of you today. We hope all your questions were addressed. If a question was overlooked or something else comes to mind after you return home, please contact a member of your Care Team listed below. Your Care Team at [place] [address] is Team #2  John Page DO (Faculty)  Tanja Babinski (Faculty)  Paz Dilladr MD (Resident)  Jose Walton MD (Resident)  Cesar Fraser MD (Resident)  Chilo Goodman MD (Resident)  Blair Hernandez., LAUREL Mcmillan.,  KIMBERLY Ballesteros., PATITO Ahumada., [facility] office)  Kelly Mayberry, [address] (Clinical Practice Manager)  Sonia Bell [facility] (Clinical Pharmacist)     Office phone number: [phone]    If you need to get in right away due to illness, please be advised we have \"Same Day\" appointments available Monday-Friday. Please call us at [phone] option #3 to schedule your \"Same Day\" appointment.

## 2022-07-08 NOTE — PROGRESS NOTES
Visit Information    Have you changed or started any medications since your last visit including any over-the-counter medicines, vitamins, or herbal medicines? no   Have you stopped taking any of your medications? Is so, why? -  no  Are you having any side effects from any of your medications? - no    Have you seen any other physician or provider since your last visit? yes - PT, Pain Management, Nuerosurgery   Have you had any other diagnostic tests since your last visit? yes - MRI   Have you been seen in the emergency room and/or had an admission in a hospital since we last saw you?  no   Have you had your routine dental cleaning in the past 6 months?  no     Do you have an active MyChart account? If no, what is the barrier?   Yes    Patient Care Team:  Diana Gill DO as PCP - General (Family Medicine)  Diana Gill DO as PCP - [facility] Provider    Medical History Review  Past Medical, Family, and Social History reviewed and does not contribute to the patient presenting condition    Health Maintenance   Topic Date Due    COVID-19 Vaccine (1) Never done    Hepatitis C screen  Never done    Shingles vaccine (1 of 2) Never done    Low dose CT lung screening  Never done    Depression Screen  01/11/2022    Flu vaccine (1) 09/01/2022    Colorectal Cancer Screen  02/08/2024    Lipids  09/16/2024    DTaP/Tdap/Td vaccine (3 - Td or Tdap) 11/27/2027    HIV screen  Completed    Hepatitis A vaccine  Aged Out    Hepatitis B vaccine  Aged Out    Hib vaccine  Aged Out    Meningococcal (ACWY) vaccine  Aged Out    Pneumococcal 0-64 years Vaccine  Aged Out
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
## 2022-07-11 ASSESSMENT — ENCOUNTER SYMPTOMS
SHORTNESS OF BREATH: 0
BLOOD IN STOOL: 0
NAUSEA: 0
CHEST TIGHTNESS: 0
COUGH: 0
VOMITING: 0
ABDOMINAL PAIN: 0
SORE THROAT: 0

## 2022-07-15 ENCOUNTER — HOSPITAL ENCOUNTER (OUTPATIENT)
Dept: PHYSICAL THERAPY | Age: 52
Setting detail: THERAPIES SERIES
Discharge: HOME | End: 2022-07-15
Payer: MEDICARE

## 2022-07-15 NOTE — FLOWSHEET NOTE
[x] [facility]) - [facility] &  Therapy  [address]    [phone]  F: [phone]   [] [id number]  [facility] [address]   [address]  P: [phone]  F: [phone]  [] [address] &  Therapy  [address] [facility] [address]  P: [phone]  F: [phone] [] [address]  P: [phone]  F: [phone]  [] [address]  [facility]   [address]   [place]: [phone]  F: [phone]   [] [place] [id number] [facility] [address]  [place]: [phone]   F: [phone]     Physical Therapy Cancel/No Show note    Date: 7/15/2022  Patient: Shawn Bergeron  : 1970  MRN: [id number]    Cancels/No Shows to date:     For today's appointment patient:    [x]  Cancelled    [] Rescheduled appointment    [] No-show     Reason given by patient:    []  Patient ill    []  Conflicting appointment    [] No transportation      [] Conflict with work    [x] No reason given    [] Weather related    [] [id number]    [] Other:      Comments:        [x] Next appointment was confirmed and scheduled next Friday    Electronically signed by: Bonilla Gupta PTA

## 2022-07-22 ENCOUNTER — HOSPITAL ENCOUNTER (OUTPATIENT)
Dept: PHYSICAL THERAPY | Age: 52
Setting detail: THERAPIES SERIES
Discharge: HOME | End: 2022-07-22
Payer: MEDICARE

## 2022-07-22 PROCEDURE — 97110 THERAPEUTIC EXERCISES: CPT

## 2022-07-22 NOTE — FLOWSHEET NOTE
goals. Completed exercises per log this date to increase low back and hip ROM and strength. Cues for exercise techniques and progressions with fair carryover. Core weakness still present. [] No change. [] Other:  [x] Patient would continue to benefit from skilled physical therapy services in order to: address low back and right hip pain; R LE numbness; limited lumbar mobility and R hip mobility; core weakness and R LE weakness; functional impairments including difficulty sitting, standing, and walking for longer periods of time. STG: (to be met in 8 treatments)  ? Pain: Decrease low back and R LE pain to 5/10  ? ROM: Increase lumbar ROM to 25% limited all motions  ? Strength: Increase core strength as demonstrated by progression of DLS exercises  ? Function: Improve Oswestry to 56% impairment  Patient to be independent with home exercise program as demonstrated by performance with correct form without cues. Demonstrate Knowledge of fall prevention  LTG: (to be met in 12 treatments)  Pt will be able to walk a half mile without increasing pain  Pt will be able to sit for more than a half hour  Improve Oswestry to 46% impairment    Patient goals: To get better. Pt. Education:  [x] Yes  [] No  [x] Reviewed Prior HEP/Ed  Method of Education: [x] Verbal  [x] Demo  [] Written  Comprehension of Education:  [x] Verbalizes understanding. [x] Demonstrates understanding. [x] Needs review. [] Demonstrates/verbalizes HEP/Ed previously given. Plan: [x] Continue current frequency toward long and short term goals.     [x] Specific Instructions for subsequent treatments: continue DLS exercises, R hip stretches;       Time In: 10:51 am           Time Out: 11:23 am     Electronically signed by:  Nirav Martinez PTA

## 2022-07-25 ENCOUNTER — TELEMEDICINE (OUTPATIENT)
Dept: NEUROLOGY | Age: 52
End: 2022-07-25
Payer: MEDICARE

## 2022-07-25 DIAGNOSIS — M54.2 CHRONIC NECK PAIN: ICD-10-CM

## 2022-07-25 DIAGNOSIS — M54.40 CHRONIC LOW BACK PAIN WITH SCIATICA, SCIATICA LATERALITY UNSPECIFIED, UNSPECIFIED BACK PAIN LATERALITY: Primary | ICD-10-CM

## 2022-07-25 DIAGNOSIS — G89.29 CHRONIC NECK PAIN: ICD-10-CM

## 2022-07-25 DIAGNOSIS — G89.29 CHRONIC LOW BACK PAIN WITH SCIATICA, SCIATICA LATERALITY UNSPECIFIED, UNSPECIFIED BACK PAIN LATERALITY: Primary | ICD-10-CM

## 2022-07-25 PROCEDURE — G8417 CALC BMI ABV UP PARAM F/U: HCPCS | Performed by: PSYCHIATRY & NEUROLOGY

## 2022-07-25 PROCEDURE — 4004F PT TOBACCO SCREEN RCVD TLK: CPT | Performed by: PSYCHIATRY & NEUROLOGY

## 2022-07-25 PROCEDURE — 99214 OFFICE O/P EST MOD 30 MIN: CPT | Performed by: PSYCHIATRY & NEUROLOGY

## 2022-07-25 PROCEDURE — G8428 CUR MEDS NOT DOCUMENT: HCPCS | Performed by: PSYCHIATRY & NEUROLOGY

## 2022-07-25 PROCEDURE — 3017F COLORECTAL CA SCREEN DOC REV: CPT | Performed by: PSYCHIATRY & NEUROLOGY

## 2022-07-25 RX ORDER — IBUPROFEN 800 MG/1
TABLET ORAL
Qty: 90 TABLET | Refills: 3 | Status: SHIPPED | OUTPATIENT
Start: 2022-07-25

## 2022-07-25 RX ORDER — GABAPENTIN 600 MG/1
TABLET ORAL
Qty: 60 TABLET | Refills: 2 | Status: SHIPPED | OUTPATIENT
Start: 2022-07-25 | End: 2022-10-23

## 2022-07-25 ASSESSMENT — ENCOUNTER SYMPTOMS
BACK PAIN: 1
COUGH: 1
GASTROINTESTINAL NEGATIVE: 1
ALLERGIC/IMMUNOLOGIC NEGATIVE: 1
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
## 2022-07-28 ENCOUNTER — HOSPITAL ENCOUNTER (OUTPATIENT)
Dept: PHYSICAL THERAPY | Age: 52
Setting detail: THERAPIES SERIES
Discharge: HOME | End: 2022-07-28
Payer: MEDICARE

## 2022-07-28 NOTE — FLOWSHEET NOTE
[x] [address] [facility] TWELVESTEP [facility] &  Therapy  [address]    [phone]  F: [phone]   [] [address]  [address] [facility] [address]   [address]  P: [phone]  F: [phone]  [] [facility] &  Therapy  [address] [facility]  P: [phone]  F: [phone] [] [address] [facility]  P: [phone]  F: [phone]  [] [address]  [facility]   [address]   [place]: [phone]  F: [phone]   [] [place] [address] [facility] [address]  [place]: [phone]   F: [phone]     Physical Therapy Cancel/No Show note    Date: 2022  Patient: Sarah Evans  : 1970  MRN: [id number]    Cancels/No Shows to date:     For today's appointment patient:    [x]  Cancelled    [] Rescheduled appointment    [] No-show     Reason given by patient:    []  Patient ill    []  Conflicting appointment    [] No transportation      [] Conflict with work    [] No reason given    [] Weather related    [] COVID-19    [x] Other:      Comments: Patient called to cancel stating he had family matters to address today.        [] Next appointment was confirmed    Electronically signed by: Mason Woody PT

## 2022-08-05 ENCOUNTER — APPOINTMENT (OUTPATIENT)
Dept: PHYSICAL THERAPY | Age: 52
End: 2022-08-05
Payer: MEDICARE

## 2022-08-11 ENCOUNTER — HOSPITAL ENCOUNTER (OUTPATIENT)
Dept: PHYSICAL THERAPY | Age: 52
Setting detail: THERAPIES SERIES
Discharge: HOME | End: 2022-08-11
Payer: MEDICARE

## 2022-08-11 PROCEDURE — 97110 THERAPEUTIC EXERCISES: CPT

## 2022-08-11 NOTE — FLOWSHEET NOTE
[x] [address] [facility] &  Therapy  [address]  [phone]  F: [phone]     Physical Therapy Daily Treatment Note    Date:  2022  Patient Name:  Rosa Rodriguez    :  1970  MRN: [id number]  Physician: Tammi Hough, APRN - CNP                  Insurance: [facility] [employer]   Medical Diagnosis: Lumbar radiculopathy (M54.16 [ICD-10-CM])                Rehab Codes: M54.9, M79.604, R20.2, M25.60,   Onset Date: 3/17/22                                 Next 's appt.: 22   Visits:    Cancels/No Shows:     Subjective:    Pain:  [x] Yes  [] No Location: Low back, R LE Pain Rating: (0-10 scale) 5/10  Pain altered Tx:  [x] No  [] Yes  Action:  Comments: Patient arrives after short lapse in treatments due to difficulty with transportation, per patient \"should be good with transportation now\". Notes continued pain in RLE - states calf and thigh mostly- as well as low back.      Objective:  Modalities: Traction lumbar supine 3 steps, max 80 lbs, min 20 lbs, 50% pull speed, 60 sec on, 10 sec off for 15 minutes after - HELD 22   Precautions:   Exercises:  Exercise Reps/ Time Weight/ Level Comments Completed  22   Nustep 5 mins L3  x          Seated       Hamstring stretch 3x 30 sec stool x   LAQ 15x ea  Added  x   Lumbar flexion stretch 10x5\" ea B sides/ flexion Red pball x   Seated piriformis stretch 3x20\" ea  Figure 4, lean fwd x                 Supine       LTR    10x ea 5 sec  x   Marching       Bridges 15x  With ball squeeze x   SLR 15x   x   Clamshells 20x Blue Added resistance  x   SKTC 3x 10 sec towel x   Piriformis stretch   Leg cross over opposite leg, knee to opposite shoulder                  Standing       Gastroc stretch 3x20\"  Added 8/11 x   4-way hip  10x ea lime  x   Step ups   15x ea Fwd/lat 6\"  x   Total gym squats  15x L35 Added  x   Other:      Treatment Charges: Mins Units   []  Modalities     [x]  Ther Exercise 35 2   []  Manual Therapy     []  Ther Activities     []  Aquatics     []  Vasocompression     []  Other-traction     Total Treatment time 35 mins 2       Assessment: [x] Progressing toward goals. Progressions to therapeutic exercise program as noted above with good tolerance overall this date. Patient noted fatigue through low back following standing program, however subsided somewhat following SB rollouts. Patient stated felt just a little sore post treatment, but no pain or increase in symptoms. [] No change. [x] Other: Adjusted patient's SPC to be one notch taller for improved gait mechanics with good carryover post.  [x] Patient would continue to benefit from skilled physical therapy services in order to: address low back and right hip pain; R LE numbness; limited lumbar mobility and R hip mobility; core weakness and R LE weakness; functional impairments including difficulty sitting, standing, and walking for longer periods of time. STG: (to be met in 8 treatments)  ? Pain: Decrease low back and R LE pain to 5/10  ? ROM: Increase lumbar ROM to 25% limited all motions  ? Strength: Increase core strength as demonstrated by progression of DLS exercises  ? Function: Improve Oswestry to 56% impairment  Patient to be independent with home exercise program as demonstrated by performance with correct form without cues. Demonstrate Knowledge of fall prevention  LTG: (to be met in 12 treatments)  Pt will be able to walk a half mile without increasing pain  Pt will be able to sit for more than a half hour  Improve Oswestry to 46% impairment    Patient goals: To get better. Pt. Education:  [x] Yes  [] No  [x] Reviewed Prior HEP/Ed  Method of Education: [x] Verbal  [x] Demo  [] Written  Comprehension of Education:  [x] Verbalizes understanding. [x] Demonstrates understanding. [x] Needs review. [] Demonstrates/verbalizes HEP/Ed previously given.      Plan: [x] Continue current frequency toward long and short term goals.     [x] Specific Instructions for subsequent treatments: progress       Time In: 259 pm           Time Out: 340 pm     Electronically signed by:  Colt Lewis PTA

## 2022-08-15 ENCOUNTER — HOSPITAL ENCOUNTER (OUTPATIENT)
Dept: NEUROLOGY | Age: 52
Discharge: HOME | End: 2022-08-15
Payer: MEDICARE

## 2022-08-15 PROCEDURE — 95908 NRV CNDJ TST 3-4 STUDIES: CPT | Performed by: PHYSICAL MEDICINE & REHABILITATION

## 2022-08-15 PROCEDURE — 95886 MUSC TEST DONE W/N TEST COMP: CPT | Performed by: PHYSICAL MEDICINE & REHABILITATION

## 2022-08-19 ENCOUNTER — HOSPITAL ENCOUNTER (OUTPATIENT)
Dept: PHYSICAL THERAPY | Age: 52
Setting detail: THERAPIES SERIES
Discharge: HOME | End: 2022-08-19
Payer: MEDICARE

## 2022-08-19 PROCEDURE — 97110 THERAPEUTIC EXERCISES: CPT

## 2022-08-19 NOTE — FLOWSHEET NOTE
progress       Time In: 300 pm           Time Out:  345 pm     Electronically signed by:  Alix Fleming, KAMI

## 2022-08-26 ENCOUNTER — HOSPITAL ENCOUNTER (OUTPATIENT)
Dept: PHYSICAL THERAPY | Age: 52
Setting detail: THERAPIES SERIES
Discharge: HOME | End: 2022-08-26
Payer: MEDICARE

## 2022-08-26 PROCEDURE — 97110 THERAPEUTIC EXERCISES: CPT

## 2022-08-26 NOTE — FLOWSHEET NOTE
[]  Manual Therapy     []  Ther Activities     []  Aquatics     []  Vasocompression     []  Other-traction     Total Treatment time 45 mins 3       Assessment: [x] Progressing toward goals. Progressed core strengthening with addition of palloff press this date as noted. Patient demonstrates improved core endurance and overall activity tolerance this date however continues to require short rest breaks between exercises throughout. Notes no pain or symptoms throughout. [] No change. [] Other:   [x] Patient would continue to benefit from skilled physical therapy services in order to: address low back and right hip pain; R LE numbness; limited lumbar mobility and R hip mobility; core weakness and R LE weakness; functional impairments including difficulty sitting, standing, and walking for longer periods of time. STG: (to be met in 8 treatments)  ? Pain: Decrease low back and R LE pain to 5/10  ? ROM: Increase lumbar ROM to 25% limited all motions  ? Strength: Increase core strength as demonstrated by progression of DLS exercises  ? Function: Improve Oswestry to 56% impairment  Patient to be independent with home exercise program as demonstrated by performance with correct form without cues. Demonstrate Knowledge of fall prevention    LTG: (to be met in 12 treatments)  Pt will be able to walk a half mile without increasing pain  Pt will be able to sit for more than a half hour  Improve Oswestry to 46% impairment    Patient goals: To get better. Pt. Education:  [x] Yes  [] No  [x] Reviewed Prior HEP/Ed  Method of Education: [x] Verbal  [x] Demo  [] Written  Comprehension of Education:  [x] Verbalizes understanding. [x] Demonstrates understanding. [x] Needs review. [] Demonstrates/verbalizes HEP/Ed previously given. Plan: [x] Continue current frequency toward long and short term goals.     [x] Specific Instructions for subsequent treatments: progress to leg press, progress core      Time In: 305 pm           Time Out:  350 pm     Electronically signed by:  Fernandez Falk PTA

## 2022-09-02 ENCOUNTER — HOSPITAL ENCOUNTER (OUTPATIENT)
Dept: PHYSICAL THERAPY | Age: 52
Setting detail: THERAPIES SERIES
Discharge: HOME | End: 2022-09-02
Payer: MEDICARE

## 2022-09-02 PROCEDURE — 97110 THERAPEUTIC EXERCISES: CPT

## 2022-09-02 NOTE — FLOWSHEET NOTE
[x] [facility] &  Therapy  [address]  [phone]  F: [phone]     Physical Therapy Daily Treatment Note    Date:  2022  Patient Name:  Shawn Bergeron    :  1970  MRN: [id number]  Physician: AYLEEN Craft - BELLA                  Insurance: [facility]   Medical Diagnosis: Lumbar radiculopathy (M54.16 [ICD-10-CM])                Rehab Codes: M54.9, M79.604, R20.2, M25.60,   Onset Date: 3/17/22                                 Next 's appt.: 22   Visits:    Cancels/No Shows:     Subjective:    Pain:  [x] Yes  [] No Location: Low back, R LE Pain Rating: (0-10 scale) 3-4/10  Pain altered Tx:  [x] No  [] Yes  Action:  Comments: Patient reported that he is feeling ok but notes soreness in R LE.     Objective:  Modalities: Traction lumbar supine 3 steps, max 80 lbs, min 20 lbs, 50% pull speed, 60 sec on, 10 sec off for 15 minutes after - HELD 22   Precautions:   Exercises:  Exercise Reps/ Time Weight/ Level Comments Completed  22   Nustep 5 mins L3  x          Seated       Hamstring stretch       LAQ 15x ea 2lbs Added weight  x   Lumbar flexion stretch 10x5\" ea B sides/ flexion Red pball x   Seated piriformis stretch 3x20\" ea  Figure 4, lean fwd x                 Supine       LTR    10x ea 5 sec  x   Marching       Bridges 15x  With ball squeeze x   SLR 15x 2lb Added weight  x   SKTC 3x 10 sec towel x   Piriformis stretch   Leg cross over opposite leg, knee to opposite shoulder           Sidelying       Clamshells 15x Blue  x   Hip abduction 15x 2lb  x          Standing       Gastroc stretch 3x20\"   x   HS stretch 3x20\"  Modified to standing 8/26 x   4-way hip  15x ea Blue  x   Marching 15x ea   x   Palloff press 15x ea Plum Added  x   Step ups   15x ea Fwd/lat 6\"  x   Total gym squats  2x10 L35  x   Leg press 2x10 30 lbs  x   Other:      Treatment Charges: Mins Units   []  Modalities     [x]  Ther Exercise 43 3   []  Manual Therapy     []  Ther Activities     []  Aquatics     []  Vasocompression     []  Other-traction     Total Treatment time 43 mins 3       Assessment: [x] Progressing toward goals. Pt with good tolerance to all activities. Progressed reps as able to improve strength and endurance. Added leg press to improve LE strength with pt noting some difficulty but was able to complete all reps. Pt noted fatigue post session. [] No change. [] Other:   [x] Patient would continue to benefit from skilled physical therapy services in order to: address low back and right hip pain; R LE numbness; limited lumbar mobility and R hip mobility; core weakness and R LE weakness; functional impairments including difficulty sitting, standing, and walking for longer periods of time. STG: (to be met in 8 treatments)  ? Pain: Decrease low back and R LE pain to 5/10  ? ROM: Increase lumbar ROM to 25% limited all motions  ? Strength: Increase core strength as demonstrated by progression of DLS exercises  ? Function: Improve Oswestry to 56% impairment  Patient to be independent with home exercise program as demonstrated by performance with correct form without cues. Demonstrate Knowledge of fall prevention    LTG: (to be met in 12 treatments)  Pt will be able to walk a half mile without increasing pain  Pt will be able to sit for more than a half hour  Improve Oswestry to 46% impairment    Patient goals: To get better. Pt. Education:  [x] Yes  [] No  [x] Reviewed Prior HEP/Ed  Method of Education: [x] Verbal  [x] Demo  [] Written  Comprehension of Education:  [x] Verbalizes understanding. [x] Demonstrates understanding. [x] Needs review. [] Demonstrates/verbalizes HEP/Ed previously given. Plan: [x] Continue current frequency toward long and short term goals.     [x] Specific Instructions for subsequent treatments: progress to leg press, progress core      Time In: 1400           Time Out:  1448 Electronically signed by:  Amadou Crisostomo, KAMI

## 2022-09-09 ENCOUNTER — HOSPITAL ENCOUNTER (OUTPATIENT)
Dept: PHYSICAL THERAPY | Age: 52
Setting detail: THERAPIES SERIES
Discharge: HOME | End: 2022-09-09
Payer: MEDICARE

## 2022-09-09 NOTE — FLOWSHEET NOTE
[x] [facility] TWELVESTEP [facility] &  Therapy  [address]    [phone]  F: [phone]   [] [address]  [facility] [address]   [address]  P: [phone]  F: [phone]  [] [address] &  Therapy  [address] [facility] [address]  P: [phone]  F: [phone] [] [address]  P: [phone]  F: [phone]  [] [address]  [facility]   [address]   [place]: [phone]  F: [phone]   [] [address]  [place]: [phone]   F: [phone]     Physical Therapy Cancel/No Show note    Date: 2022  Patient: Axel Cueto  : 1970  MRN: [id number]    Cancels/No Shows to date: 3/1    For today's appointment patient:    [x]  Cancelled    [] Rescheduled appointment    [] No-show     Reason given by patient:    []  Patient ill    []  Conflicting appointment    [] No transportation      [] Conflict with work    [] No reason given    [] Weather related    [] COVID-19    [x] Other:      Comments: Patient states leg is in too much pain.       [x] Next appointment was previously confirmed    Electronically signed by: Barb Mcclellan, PT

## 2022-09-16 ENCOUNTER — HOSPITAL ENCOUNTER (OUTPATIENT)
Dept: PHYSICAL THERAPY | Age: 52
Setting detail: THERAPIES SERIES
Discharge: HOME | End: 2022-09-16
Payer: MEDICARE

## 2022-09-16 PROCEDURE — 97110 THERAPEUTIC EXERCISES: CPT

## 2022-09-16 NOTE — FLOWSHEET NOTE
[x] [facility]) [facility] &  Therapy  [address]  [phone]  F: [phone]     Physical Therapy Daily Treatment Note    Date:  2022  Patient Name:  Christiano Bull    :  1970  MRN: [id number]  Physician: Azzie Bloch, APRN - CNP                  Insurance: [employer]   Medical Diagnosis: Lumbar radiculopathy (M54.16 [ICD-10-CM])                Rehab Codes: M54.9, M79.604, R20.2, M25.60,   Onset Date: 3/17/22                                 Next 's appt.: 22   Visits:    Cancels/No Shows: 3/1    Subjective:    Pain:  [x] Yes  [] No Location: Low back, R LE Pain Rating: (0-10 scale) 7/10  Pain altered Tx:  [x] No  [] Yes  Action:  Comments: Patient reports pain is not too good today, has not done anything that would have aggravated it.       Objective:  Modalities: Traction lumbar supine 3 steps, max 80 lbs, min 20 lbs, 50% pull speed, 60 sec on, 10 sec off for 15 minutes after - HELD 22   Precautions:   Exercises:  Exercise Reps/ Time Weight/ Level Comments Completed  22   Nustep 5 mins L3  x          Seated       Hamstring stretch --  Changed to Standing  --   LAQ 15x ea 2lbs  --   Lumbar flexion stretch 10x5\" ea B sides/ flexion Red pball x   Seated piriformis stretch 3x20\" ea  Figure 4, lean fwd x                 Supine       LTR    10x ea 5 sec  x   Marching       Bridges 15x  With ball squeeze x   SLR 15x 2lb  x   SKTC 3x 10 sec towel x   Piriformis stretch   Leg cross over opposite leg, knee to opposite shoulder           Sidelying       Clamshells 15x Blue  --   Hip abduction 15x 2lb  --          Standing       Gastroc stretch 3x20\"   x   HS stretch 3x20\"   x   4-way hip  15x ea Blue  x   Marching 15x ea   x   Palloff press 15x ea Plum  x   Step ups   15x ea Fwd/lat 6\"  x   Total gym squats  2x10 L35     Leg press 2x10 30 lbs  x   Other:      Treatment Charges: Mins Units   []  Modalities     [x]  Ther Exercise 47 3 []  Manual Therapy     []  Ther Activities     []  Aquatics     []  Vasocompression     []  Other-traction     Total Treatment time 47 mins 3       Assessment: [x] Progressing toward goals. Pt able to tolerate most of previous ex this date despite increased pain. Pt cont to require cues for recall and correct technique ex, including ex from HEP. reports back is getting a little sore at end of ex, rates pain at 7/10. Offered Pt HP or CP and ES, Pt declined, states he's going right home. [] No change. [] Other:   [x] Patient would continue to benefit from skilled physical therapy services in order to: address low back and right hip pain; R LE numbness; limited lumbar mobility and R hip mobility; core weakness and R LE weakness; functional impairments including difficulty sitting, standing, and walking for longer periods of time. STG: (to be met in 8 treatments)  ? Pain: Decrease low back and R LE pain to 5/10-Progress Toward  ? ROM: Increase lumbar ROM to 25% limited all motions  ? Strength: Increase core strength as demonstrated by progression of DLS exercises-Progress Toward  ? Function: Improve Oswestry to 56% impairment  Patient to be independent with home exercise program as demonstrated by performance with correct form without cues-Progress Toward  Demonstrate Knowledge of fall prevention    LTG: (to be met in 12 treatments)  Pt will be able to walk a half mile without increasing pain  Pt will be able to sit for more than a half hour  Improve Oswestry to 46% impairment    Patient goals: To get better. Pt. Education:  [x] Yes  [] No  [x] Reviewed Prior HEP/Ed  Method of Education: [x] Verbal  [x] Demo  [] Written  Comprehension of Education:  [x] Verbalizes understanding. [x] Demonstrates understanding. [x] Needs review. [] Demonstrates/verbalizes HEP/Ed previously given. Plan: [x] Continue current frequency toward long and short term goals.     [x] Specific Instructions for subsequent treatments: progress core strength, issue fall prevention, Pt complete Oswestry       Time In: 1400           Time Out:  9004    Electronically signed by:  Gerhardt Rubinstein, PT

## 2022-09-23 ENCOUNTER — HOSPITAL ENCOUNTER (OUTPATIENT)
Dept: PHYSICAL THERAPY | Age: 52
Setting detail: THERAPIES SERIES
Discharge: HOME | End: 2022-09-23
Payer: MEDICARE

## 2022-09-23 NOTE — FLOWSHEET NOTE
[x] [facility]) [facility] &  Therapy  [address]    [phone]  F: [phone]   [] [address]  [facility] [address]   [address]  P: [phone]  F: [phone]  [] [address]  [address]  P: [phone]  F: [phone] [] [address]  P: [phone]  F: [phone]  [] [address]  [facility]   [address]   [place]: [phone]  F: [phone]   [] [place] [address] [facility] [address]  [place]: [phone]   F: [phone]     Physical Therapy Cancel/No Show note    Date: 2022  Patient: Carol Manning  : 1970  MRN: [id number]    Cancels/No Shows to date:     For today's appointment patient:    [x]  Cancelled    [x] Rescheduled appointment    [] No-show     Reason given by patient:    []  Patient ill    []  Conflicting appointment    [] No transportation      [] Conflict with work    [x] No reason given    [] Weather related    [] COVID-19    [x] Other:      Comments: Next appt rescheduled to 22 @ 230pm.      [x] Next appointment was confirmed    Electronically signed by: Flory Jeronimo PTA

## 2022-09-30 ENCOUNTER — HOSPITAL ENCOUNTER (OUTPATIENT)
Dept: PHYSICAL THERAPY | Age: 52
Setting detail: THERAPIES SERIES
Discharge: HOME | End: 2022-09-30
Payer: MEDICARE

## 2022-09-30 PROCEDURE — 97110 THERAPEUTIC EXERCISES: CPT

## 2022-09-30 NOTE — FLOWSHEET NOTE
[x] [facility]) [facility] &  Therapy  [address]  [phone]  F: [phone]     Physical Therapy Daily Treatment Note    Date:  2022  Patient Name:  Teri Cyr    :  1970  MRN: [id number]  Physician: AYLEEN Cosby - CNP                  Insurance: [employer]   Medical Diagnosis: Lumbar radiculopathy (M54.16 [ICD-10-CM])                Rehab Codes: M54.9, M79.604, R20.2, M25.60,   Onset Date: 3/17/22                                 Next 's appt.: 10/7/22   Visits: 10/12   Cancels/No Shows:     Subjective:    Pain:  [x] Yes  [] No Location: Low back, R LE Pain Rating: (0-10 scale) 5-6/10  Pain altered Tx:  [x] No  [] Yes  Action:  Comments: Arrives stating pain is 5-6/10 in low back and R LE.      Objective:  Modalities: Traction lumbar supine 3 steps, max 80 lbs, min 20 lbs, 50% pull speed, 60 sec on, 10 sec off for 15 minutes after - HELD 22   Precautions:   Exercises:  Exercise Reps/ Time Weight/ Level Comments Completed  22   Nustep 5 mins L3  x          Seated       Hamstring stretch --  Changed to Standing  --   LAQ 15x ea 2lbs  --   Lumbar flexion stretch 10x5\" ea B sides/ flexion Red pball x   Seated piriformis stretch 3x20\" ea  Figure 4, lean fwd x                 Supine       LTR    10x ea 5 sec  x   Marching       Bridges    15x  With ball squeeze x   SLR 15x 2lb  x   SKTC 3x 10 sec towel x   Piriformis stretch   Leg cross over opposite leg, knee to opposite shoulder           Sidelying       Clamshells 15x Blue  --   Hip abduction 15x 2lb  --          Standing       Gastroc stretch  3x20\"   x   HS stretch  3x20\"   x   4-way hip   15x ea Blue  x   Marching   15x ea   x   Palloff press  15x ea Plum  x   Step ups   15x ea Fwd/lat 6\"  x   Total gym squats  2x10 L35     Leg press  2x10 30 lbs  x   Other:      Treatment Charges: Mins Units   []  Modalities     [x]  Ther Exercise 33 2   []  Manual Therapy     []  Ther Activities     []  Aquatics     []  Vasocompression     []  Other-traction     Total Treatment time 33 mins        Assessment: [x] Progressing toward goals. Continued exercises per log this date with cues for exercise techniques and progressions with fairly good carryover. Patient given Oswestry this date, 58% impairment. Fall prevention handout given this date. Patient wanted to schedule once per week as he has 2 visits left. [] No change. [] Other:   [x] Patient would continue to benefit from skilled physical therapy services in order to: address low back and right hip pain; R LE numbness; limited lumbar mobility and R hip mobility; core weakness and R LE weakness; functional impairments including difficulty sitting, standing, and walking for longer periods of time. STG: (to be met in 8 treatments)  ? Pain: Decrease low back and R LE pain to 5/10-Progress Toward  ? ROM: Increase lumbar ROM to 25% limited all motions  ? Strength: Increase core strength as demonstrated by progression of DLS exercises-Progress Toward  ? Function: Improve Oswestry to 56% impairment--Progress, 58% impairment  Patient to be independent with home exercise program as demonstrated by performance with correct form without cues-Progress Toward  Demonstrate Knowledge of fall prevention--MET, given 9/30/22    LTG: (to be met in 12 treatments)  Pt will be able to walk a half mile without increasing pain  Pt will be able to sit for more than a half hour  Improve Oswestry to 46% impairment    Patient goals: To get better. Pt. Education:  [x] Yes  [] No  [x] Reviewed Prior HEP/Ed  Method of Education: [x] Verbal  [x] Demo  [x] Written--fall prevention handout  Comprehension of Education:  [x] Verbalizes understanding. [x] Demonstrates understanding. [x] Needs review. [] Demonstrates/verbalizes HEP/Ed previously given. Plan: [x] Continue current frequency toward long and short term goals.     [x] Specific Instructions for subsequent treatments: progress core strength      Time In: 1430  Time Out: 1509    Electronically signed by:  Marina Olmos PTA

## 2022-10-06 ENCOUNTER — HOSPITAL ENCOUNTER (OUTPATIENT)
Dept: PHYSICAL THERAPY | Age: 52
Setting detail: THERAPIES SERIES
Discharge: HOME | End: 2022-10-06
Payer: MEDICARE

## 2022-10-06 PROCEDURE — 97110 THERAPEUTIC EXERCISES: CPT

## 2022-10-06 NOTE — FLOWSHEET NOTE
[x] [facility]) [facility] &  Therapy  [address]  [phone]  F: [phone]     Physical Therapy Daily Treatment Note    Date:  10/6/2022  Patient Name:  Nava Pollard    :  1970  MRN: [id number]  Physician: [address] [place] [address], [facility]                  Insurance: [employer]   Medical Diagnosis: Lumbar radiculopathy (M54.16 [ICD-10-CM])                Rehab Codes: M54.9, M79.604, R20.2, M25.60,   Onset Date: 3/17/22                                 Next 's appt.: 10/7/22   Visits:    Cancels/No Shows:     Subjective:    Pain:  [x] Yes  [] No Location: Low back, R LE Pain Rating: (0-10 scale) 5/10  Pain altered Tx:  [x] No  [] Yes  Action:  Comments: Pt reported continued pain in low back and R LE. No change in pain level.      Objective:  Modalities: Traction lumbar supine 3 steps, max 80 lbs, min 20 lbs, 50% pull speed, 60 sec on, 10 sec off for 15 minutes after - HELD 10/06/22   Precautions:   Exercises:  Exercise Reps/ Time Weight/ Level Comments Completed  10/06/22   Nustep 5 mins L3  x          Seated       Hamstring stretch --  Changed to Standing  --   LAQ 15x ea 2lbs  --   Lumbar flexion stretch 10x5\" ea B sides/ flexion Red pball x   Seated piriformis stretch 3x20\" ea  Figure 4, lean fwd x                 Supine   Held 10/6 time restraints    LTR    10x ea 5 sec  x   Marching       Bridges    15x  With ball squeeze x   SLR 15x 2lb  x   SKTC 3x 10 sec towel x   Piriformis stretch   Leg cross over opposite leg, knee to opposite shoulder           Sidelying       Clamshells 15x Blue  --   Hip abduction 15x 2lb  --          Standing       Gastroc stretch  3x20\"   x   HS stretch  3x20\"   x   4-way hip   15x ea Blue  x   Marching   15x ea   x   Palloff press  15x ea Plum  x   Step ups   15x ea Fwd/lat 8\"  x   Total gym squats  2x10 L35     Leg press  2x10 30 lbs  x   Other:      Treatment Charges: Mins Units   []  Modalities     [x]  Ther Exercise 33 2   []  Manual Therapy     []  Ther Activities     []  Aquatics     []  Vasocompression     []  Other-traction     Total Treatment time 33 mins 2       Assessment: [x] Progressing toward goals. Pt with good tolerance to all activities. Progressed reps as tolerated to improve LE, core and low back strength. Pt needed to leave early was unable to complete supine activities. [] No change. [] Other:   [x] Patient would continue to benefit from skilled physical therapy services in order to: address low back and right hip pain; R LE numbness; limited lumbar mobility and R hip mobility; core weakness and R LE weakness; functional impairments including difficulty sitting, standing, and walking for longer periods of time. STG: (to be met in 8 treatments)  ? Pain: Decrease low back and R LE pain to 5/10-Progress Toward  ? ROM: Increase lumbar ROM to 25% limited all motions  ? Strength: Increase core strength as demonstrated by progression of DLS exercises-Progress Toward  ? Function: Improve Oswestry to 56% impairment--Progress, 58% impairment  Patient to be independent with home exercise program as demonstrated by performance with correct form without cues-Progress Toward  Demonstrate Knowledge of fall prevention--MET, given 9/30/22    LTG: (to be met in 12 treatments)  Pt will be able to walk a half mile without increasing pain  Pt will be able to sit for more than a half hour  Improve Oswestry to 46% impairment    Patient goals: To get better. Pt. Education:  [x] Yes  [] No  [x] Reviewed Prior HEP/Ed  Method of Education: [x] Verbal  [x] Demo  [x] Written--fall prevention handout  Comprehension of Education:  [x] Verbalizes understanding. [x] Demonstrates understanding. [x] Needs review. [] Demonstrates/verbalizes HEP/Ed previously given. Plan: [x] Continue current frequency toward long and short term goals.     [x] Specific Instructions for subsequent treatments: progress core strength      Time In: 1507  Time Out: 2137    Electronically signed by:  Chelsy Urena PTA

## 2022-10-07 ENCOUNTER — OFFICE VISIT (OUTPATIENT)
Dept: NEUROSURGERY | Age: 52
End: 2022-10-07
Payer: MEDICARE

## 2022-10-07 VITALS
HEART RATE: 81 BPM | BODY MASS INDEX: 31.25 KG/M2 | SYSTOLIC BLOOD PRESSURE: 114 MMHG | WEIGHT: 211 LBS | OXYGEN SATURATION: 95 % | HEIGHT: 69 IN | DIASTOLIC BLOOD PRESSURE: 74 MMHG

## 2022-10-07 DIAGNOSIS — R20.0 RIGHT LEG NUMBNESS: ICD-10-CM

## 2022-10-07 DIAGNOSIS — R20.0 BILATERAL HAND NUMBNESS: Primary | ICD-10-CM

## 2022-10-07 DIAGNOSIS — M47.816 LUMBAR SPONDYLOSIS: ICD-10-CM

## 2022-10-07 DIAGNOSIS — Z98.1 HX OF FUSION OF CERVICAL SPINE: ICD-10-CM

## 2022-10-07 PROCEDURE — G8417 CALC BMI ABV UP PARAM F/U: HCPCS | Performed by: NURSE PRACTITIONER

## 2022-10-07 PROCEDURE — 99214 OFFICE O/P EST MOD 30 MIN: CPT | Performed by: NURSE PRACTITIONER

## 2022-10-07 PROCEDURE — G8484 FLU IMMUNIZE NO ADMIN: HCPCS | Performed by: NURSE PRACTITIONER

## 2022-10-07 PROCEDURE — 3017F COLORECTAL CA SCREEN DOC REV: CPT | Performed by: NURSE PRACTITIONER

## 2022-10-07 PROCEDURE — G8427 DOCREV CUR MEDS BY ELIG CLIN: HCPCS | Performed by: NURSE PRACTITIONER

## 2022-10-07 PROCEDURE — 4004F PT TOBACCO SCREEN RCVD TLK: CPT | Performed by: NURSE PRACTITIONER
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
## 2022-10-14 ENCOUNTER — HOSPITAL ENCOUNTER (OUTPATIENT)
Dept: PHYSICAL THERAPY | Age: 52
Setting detail: THERAPIES SERIES
Discharge: HOME | End: 2022-10-14
Payer: MEDICARE

## 2022-10-17 NOTE — FLOWSHEET NOTE
[x] [facility]) [facility] &  Therapy  [address]    [phone]  F: [phone]   [] [address]  [facility]   [address]  P: [phone]  F: [phone]  [] [address]  [address]  P: [phone]  F: [phone] [] [address]  P: [phone]  F: [phone]  [] [address]  [facility]   [address]   [place]: [phone]  F: [phone]   [] [facility]  [address] [facility] [address]  [place]: [phone]   F: [phone]     Physical Therapy Cancel/No Show note    Date: 10/16/2022  Patient: Juan Lamar  : 1970  MRN: [id number]    Cancels/No Shows to date:     For today's appointment patient:    [x]  Cancelled    [] Rescheduled appointment    [] No-show     Reason given by patient:    []  Patient ill    []  Conflicting appointment    [] No transportation      [] Conflict with work    [x] No reason given    [] Weather related    [] [id number]    [x] Other:      Comments: Canceled 10/13/22 for 10/14/22      [x] Next appointment was confirmed -10/21/2022 at 2pm.    Electronically signed by: Connie Hagen PT

## 2022-10-21 ENCOUNTER — HOSPITAL ENCOUNTER (OUTPATIENT)
Dept: PHYSICAL THERAPY | Age: 52
Setting detail: THERAPIES SERIES
Discharge: HOME | End: 2022-10-21
Payer: MEDICARE

## 2022-10-21 NOTE — FLOWSHEET NOTE
[x] [facility]) - [facility] &  Therapy  [address]    [phone]  F: [phone]   [] [address]  [facility]   [address]  P: [phone]  F: [phone]  [] [address] &  Therapy  [address] [facility]  P: [phone]  F: [phone] [] [address]  P: [phone]  F: [phone]  [] [address]  [address] [facility] [address]   [address]   [place]: [phone]  F: [phone]   [] [place] [id number] [facility] [address]  [place]: [phone]   F: [phone]     Physical Therapy Cancel/No Show note    Date: 10/21/2022  Patient: Carmen Harrington  : 1970  MRN: [id number]    Cancels/No Shows to date:     For today's appointment patient:    [x]  Cancelled    [] Rescheduled appointment    [] No-show     Reason given by patient:    []  Patient ill    []  Conflicting appointment    [] No transportation      [] Conflict with work    [x] No reason given    [] Weather related    [] [id number]    [] Other:      Comments:  Will call back to reschedule      [] Next appointment was confirmed     Electronically signed by: Adeline Messina PT

## 2022-10-22 ENCOUNTER — HOSPITAL ENCOUNTER (OUTPATIENT)
Dept: MRI IMAGING | Age: 52
Discharge: HOME | End: 2022-10-24
Payer: MEDICARE

## 2022-10-22 DIAGNOSIS — R20.0 BILATERAL HAND NUMBNESS: ICD-10-CM

## 2022-10-22 DIAGNOSIS — Z98.1 HX OF FUSION OF CERVICAL SPINE: ICD-10-CM

## 2022-10-22 DIAGNOSIS — R20.0 RIGHT LEG NUMBNESS: ICD-10-CM

## 2022-10-22 PROCEDURE — 72141 MRI NECK SPINE W/O DYE: CPT

## 2022-11-04 ENCOUNTER — HOSPITAL ENCOUNTER (OUTPATIENT)
Dept: PHYSICAL THERAPY | Age: 52
Setting detail: THERAPIES SERIES
Discharge: HOME | End: 2022-11-04
Payer: MEDICARE

## 2022-11-04 PROCEDURE — 97110 THERAPEUTIC EXERCISES: CPT

## 2022-11-04 NOTE — DISCHARGE SUMMARY
lumbar ROM to 25% limited all motions  -- Improvements made: 82 flexion 10 ext, decr 75% lumbar rotation, side bend WFL. ? Strength: Increase core strength as demonstrated by progression of DLS exercises-Progress Toward  ? Function: Improve Oswestry to 56% impairment--Progress, 58% impairment  Patient to be independent with home exercise program as demonstrated by performance with correct form without cues-  Issued HEP 11/4/22  Demonstrate Knowledge of fall prevention--MET, given 9/30/22     LTG: (to be met in 12 treatments)  Pt will be able to walk a half mile without increasing pain -- Reports about 10 min walking before pain increases. Pt will be able to sit for more than a half hour -- Pain will be there, but can adjust to keep pain the low. Sitting in one spot too long - 5-15 min, makes him stiff. Improve Oswestry to 46% impairment --  No change, 58% functionally impaired     Patient goals: To get better. Treatment to Date:  [x] Therapeutic Exercise    [] Modalities:  [] Therapeutic Activity    [] Ultrasound  [] Electrical Stimulation  [] Gait Training     [] Massage       [] Lumbar/Cervical Traction  [] Neuromuscular Re-education [] Cold/hotpack [] Iontophoresis: 4 mg/mL  [x] Instruction in Home Exercise Program                     Dexamethasone Sodium  [] Manual Therapy             Phosphate 40-80 mAmin  [] Aquatic Therapy                   [] Vasocompression/    [x] Other: traction             Game Ready    Discharge Status:     [] Pt recovered from conditions. Treatment goals were met. [x] Pt received maximum benefit. No further therapy indicated at this time. [x] Pt to continue exercise/home instructions independently. [] Therapy interrupted due to:    [] Pt has 2 or more no shows/cancels, is discontinued per our policy. [x] Pt has completed prescribed number of treatment sessions. [x] Other: Minimal progress made due to poor attendance. Discharge.         Electronically signed by Abigail Massey, PT on 11/4/2022 at 3:27 PM      If you have any questions or concerns, please don't hesitate to call.   Thank you for your referral.

## 2022-11-04 NOTE — FLOWSHEET NOTE
[x] [facility]) [facility] &  Therapy  [address]  [phone]  F: [phone]     Physical Therapy Daily Treatment Note    Date:  2022  Patient Name:  Mervin Asencio    :  1970  MRN: [id number]  Physician: AYLEEN Gilmore - BELLA                  Insurance: [employer] [employer]   Medical Diagnosis: Lumbar radiculopathy (M54.16 [ICD-10-CM])                Rehab Codes: M54.9, M79.604, R20.2, M25.60,   Onset Date: 3/17/22                                 Next 's appt.: 10/7/22   Visits:    Cancels/No Shows:     Subjective:    Pain:  [x] Yes  [] No Location: Low back, Right LE   Pain Rating: (0-10 scale) 10  Pain altered Tx:  [x] No  [] Yes  Action:  Comments:  Aware today is last day - activity level at home could be better (but also says it could be worse). Thinks things have been a little better over the last month.       Objective:  Modalities:   Precautions:   Exercises:  Exercise Reps/ Time Weight/ Level Comments Completed  22   Nustep 5 mins L3  x          Seated       Hamstring stretch --  Changed to Standing  --   LAQ 15x ea 2lbs HEP x   Lumbar flexion stretch 10x5\" ea B sides/ flexion Red pball x   Seated piriformis stretch 3x20\" ea  Figure 4, lean fwd HEP x                 Supine       LTR    10x ea 5 sec HEP x   Marching       Bridges    15x  With ball squeeze HEP x   SLR 15x 2lb HEP x   SKTC 3x 10 sec Towel HEP x   Piriformis stretch   Leg cross over opposite leg, knee to opposite shoulder           Sidelying       Clamshells 15x Blue HEP --   Hip abduction 15x 2lb HEP --          Standing       Gastroc stretch  3x20\"   x   HS stretch  3x20\"  HEP x   4-way hip   15x ea [place] HEP x   Marching   15x ea 2 lb HEP x   Palloff press  15x ea Plum  x   Step ups   15x ea Fwd/lat 8\"  x   Total gym squats  2x10 L35  x   Leg press  2x10 30 lbs  x   Other:      Treatment Charges: Mins Units   []  Modalities     [x]  Ther Exercise 33 2   [] Manual Therapy     []  Ther Activities     []  Aquatics     []  Vasocompression     []  Other-traction     Total Treatment time 33 mins 2       Assessment: [x] Progressing toward goals. Pt with good tolerance to all activities. Progressed reps as tolerated to improve LE, core and low back strength. Pt needed to leave early was unable to complete supine activities. [] No change. [] Other:   [x] Patient would continue to benefit from skilled physical therapy services in order to: address low back and right hip pain; R LE numbness; limited lumbar mobility and R hip mobility; core weakness and R LE weakness; functional impairments including difficulty sitting, standing, and walking for longer periods of time. STG: (to be met in 8 treatments)  ? Pain: Decrease low back and R LE pain to 5/10-Progress Toward  ? ROM: Increase lumbar ROM to 25% limited all motions  -- Improvements made: 82 flexion 10 ext, decr 75% lumbar rotation, side bend WFL. ? Strength: Increase core strength as demonstrated by progression of DLS exercises-Progress Toward  ? Function: Improve Oswestry to 56% impairment--Progress, 58% impairment  Patient to be independent with home exercise program as demonstrated by performance with correct form without cues-  Issued HEP 11/4/22  Demonstrate Knowledge of fall prevention--MET, given 9/30/22    LTG: (to be met in 12 treatments)  Pt will be able to walk a half mile without increasing pain -- Reports about 10 min walking before pain increases. Pt will be able to sit for more than a half hour -- Pain will be there, but can adjust to keep pain the low. Sitting in one spot too long - 5-15 min, makes him stiff. Improve Oswestry to 46% impairment --  No change, 58% functionally impaired    Patient goals: To get better.       Pt. Education:  [x] Yes  [] No  [x] Reviewed Prior HEP/Ed  Method of Education: [x] Verbal  [x] Demo  [x] Written-- bridge, clamshell, LTR, side hip abd, SLR, stand HS stretch, seated piriformis stretch, LAQ, SKTC, march, 4 way hip with plum resistance band. Comprehension of Education:  [x] Verbalizes understanding. [x] Demonstrates understanding. [] Needs review. [] Demonstrates/verbalizes HEP/Ed previously given.      Plan: [x] Discharge      Time In: 3460  Time Out: 700 Anirudh    Electronically signed by:  Juan Aquino PT

## 2023-01-05 NOTE — TELEPHONE ENCOUNTER
Pharmacy requesting refill of sertraline (ZOLOFT) 50 MG tablet      Medication active on med list yes      Date of last Rx: 09/07/2022 with 3 refills          verified by LAUREL ALBRIGHT      Date of last appointment 07/25/2022    Next Visit Date:  3/23/2023

## 2023-01-20 ENCOUNTER — OFFICE VISIT (OUTPATIENT)
Dept: FAMILY MEDICINE CLINIC | Age: 53
End: 2023-01-20

## 2023-01-20 VITALS
TEMPERATURE: 97.3 F | WEIGHT: 211.6 LBS | SYSTOLIC BLOOD PRESSURE: 103 MMHG | BODY MASS INDEX: 31.34 KG/M2 | HEART RATE: 90 BPM | HEIGHT: 69 IN | DIASTOLIC BLOOD PRESSURE: 69 MMHG

## 2023-01-20 DIAGNOSIS — Z23 NEEDS FLU SHOT: Primary | ICD-10-CM

## 2023-01-20 DIAGNOSIS — Z00.00 ENCOUNTER FOR WELL ADULT EXAM WITHOUT ABNORMAL FINDINGS: ICD-10-CM

## 2023-01-20 DIAGNOSIS — Z87.828 HISTORY OF MOTOR VEHICLE ACCIDENT: ICD-10-CM

## 2023-01-20 RX ORDER — VENLAFAXINE HYDROCHLORIDE 37.5 MG/1
37.5 CAPSULE, EXTENDED RELEASE ORAL DAILY
Qty: 30 CAPSULE | Refills: 5 | Status: SHIPPED | OUTPATIENT
Start: 2023-01-20

## 2023-01-20 SDOH — ECONOMIC STABILITY: FOOD INSECURITY: WITHIN THE PAST 12 MONTHS, THE FOOD YOU BOUGHT JUST DIDN'T LAST AND YOU DIDN'T HAVE MONEY TO GET MORE.: NEVER TRUE

## 2023-01-20 SDOH — ECONOMIC STABILITY: FOOD INSECURITY: WITHIN THE PAST 12 MONTHS, YOU WORRIED THAT YOUR FOOD WOULD RUN OUT BEFORE YOU GOT MONEY TO BUY MORE.: NEVER TRUE

## 2023-01-20 ASSESSMENT — PATIENT HEALTH QUESTIONNAIRE - PHQ9
SUM OF ALL RESPONSES TO PHQ9 QUESTIONS 1 & 2: 0
SUM OF ALL RESPONSES TO PHQ QUESTIONS 1-9: 0
1. LITTLE INTEREST OR PLEASURE IN DOING THINGS: 0
SUM OF ALL RESPONSES TO PHQ QUESTIONS 1-9: 0
2. FEELING DOWN, DEPRESSED OR HOPELESS: 0
SUM OF ALL RESPONSES TO PHQ QUESTIONS 1-9: 0
SUM OF ALL RESPONSES TO PHQ QUESTIONS 1-9: 0

## 2023-01-20 ASSESSMENT — SOCIAL DETERMINANTS OF HEALTH (SDOH): HOW HARD IS IT FOR YOU TO PAY FOR THE VERY BASICS LIKE FOOD, HOUSING, MEDICAL CARE, AND HEATING?: VERY HARD

## 2023-01-20 NOTE — PATIENT INSTRUCTIONS
Thank you for letting us take care of you today. We hope all your questions were addressed. If a question was overlooked or something else comes to mind after you return home, please contact a member of your Care Team listed below. Your Care Team at [place] [address] is Team #2  Luzmaria Zhong DO (Faculty)  Ana Laura Soto (Faculty)  Tato Coleman MD (Resident)  Snow Foreman MD (Resident)  Kelly Fam MD (Resident)  Jody Ansari MD (Resident)  Dale Dyson., BAL Sellers.,  KIMBERLY Sotelo., LEONN  Kirsten Ribeiro., [facility] office)  Kevin Mccormick, [address] (Clinical Practice Manager)  Doreene Phalen, [facility] (Clinical Pharmacist)     Office phone number: [phone]    If you need to get in right away due to illness, please be advised we have \"Same Day\" appointments available Monday-Friday. Please call us at [phone] option #3 to schedule your \"Same Day\" appointment.
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
## 2023-01-20 NOTE — PROGRESS NOTES
Visit Information    Have you changed or started any medications since your last visit including any over-the-counter medicines, vitamins, or herbal medicines? no   Have you stopped taking any of your medications? Is so, why? -  no  Are you having any side effects from any of your medications? - no    Have you seen any other physician or provider since your last visit?  no   Have you had any other diagnostic tests since your last visit?  no   Have you been seen in the emergency room and/or had an admission in a hospital since we last saw you?  no   Have you had your routine dental cleaning in the past 6 months?  no     Do you have an active MyChart account? If no, what is the barrier?   Yes    Patient Care Team:  Mai Yeh DO as PCP - General (Family Medicine)  Mai Yeh DO as PCP - [facility]    Medical History Review  Past Medical, Family, and Social History reviewed and does not contribute to the patient presenting condition    Health Maintenance   Topic Date Due    Hepatitis C screen  Never done    Shingles vaccine (1 of 2) Never done    Low dose CT lung screening  Never done    COVID-19 Vaccine (4 - Booster for Moderna series) 03/28/2022    Flu vaccine (1) 08/01/2022    Depression Screen  07/08/2023    Diabetes screen  01/11/2024    Colorectal Cancer Screen  02/08/2024    Lipids  09/16/2024    DTaP/Tdap/Td vaccine (3 - Td or Tdap) 11/27/2027    Pneumococcal 0-64 years Vaccine  Completed    HIV screen  Completed    Hepatitis A vaccine  Aged Out    Hib vaccine  Aged Out    Meningococcal (ACWY) vaccine  Aged Out

## 2023-01-24 ENCOUNTER — TELEMEDICINE (OUTPATIENT)
Dept: NEUROSURGERY | Age: 53
End: 2023-01-24
Payer: MEDICARE

## 2023-01-24 DIAGNOSIS — R20.0 BILATERAL HAND NUMBNESS: ICD-10-CM

## 2023-01-24 DIAGNOSIS — M47.816 LUMBAR SPONDYLOSIS: Primary | ICD-10-CM

## 2023-01-24 PROCEDURE — 3017F COLORECTAL CA SCREEN DOC REV: CPT | Performed by: NURSE PRACTITIONER

## 2023-01-24 PROCEDURE — 99213 OFFICE O/P EST LOW 20 MIN: CPT | Performed by: NURSE PRACTITIONER

## 2023-01-24 PROCEDURE — G8417 CALC BMI ABV UP PARAM F/U: HCPCS | Performed by: NURSE PRACTITIONER

## 2023-01-24 PROCEDURE — 4004F PT TOBACCO SCREEN RCVD TLK: CPT | Performed by: NURSE PRACTITIONER

## 2023-01-24 PROCEDURE — G8427 DOCREV CUR MEDS BY ELIG CLIN: HCPCS | Performed by: NURSE PRACTITIONER

## 2023-01-24 PROCEDURE — G8482 FLU IMMUNIZE ORDER/ADMIN: HCPCS | Performed by: NURSE PRACTITIONER
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
## 2023-02-14 DIAGNOSIS — G89.29 CHRONIC LOW BACK PAIN WITH SCIATICA, SCIATICA LATERALITY UNSPECIFIED, UNSPECIFIED BACK PAIN LATERALITY: ICD-10-CM

## 2023-02-14 DIAGNOSIS — M54.40 CHRONIC LOW BACK PAIN WITH SCIATICA, SCIATICA LATERALITY UNSPECIFIED, UNSPECIFIED BACK PAIN LATERALITY: ICD-10-CM

## 2023-02-14 DIAGNOSIS — M54.2 CHRONIC NECK PAIN: ICD-10-CM

## 2023-02-14 DIAGNOSIS — G89.29 CHRONIC NECK PAIN: ICD-10-CM

## 2023-02-14 DIAGNOSIS — M54.16 LUMBAR RADICULOPATHY: Primary | ICD-10-CM

## 2023-02-15 RX ORDER — GABAPENTIN 600 MG/1
TABLET ORAL
Qty: 60 TABLET | Refills: 5 | Status: SHIPPED | OUTPATIENT
Start: 2023-02-15 | End: 2023-08-15

## 2023-02-15 NOTE — TELEPHONE ENCOUNTER
Pharmacy requesting refill of Gabapentin 600mg.       Medication active on med list yes      Date of last fill: 07/25/2022    verified on 2/15/2023     verified by [facility] LPN      Date of last appointment 07/25/2022    Next Visit Date:  3/23/2023

## 2023-03-23 ENCOUNTER — OFFICE VISIT (OUTPATIENT)
Dept: NEUROLOGY | Age: 53
End: 2023-03-23
Payer: MEDICAID

## 2023-03-23 VITALS
HEART RATE: 74 BPM | HEIGHT: 69 IN | DIASTOLIC BLOOD PRESSURE: 69 MMHG | WEIGHT: 210 LBS | SYSTOLIC BLOOD PRESSURE: 113 MMHG | BODY MASS INDEX: 31.1 KG/M2

## 2023-03-23 DIAGNOSIS — G89.29 CHRONIC LOW BACK PAIN WITH SCIATICA, SCIATICA LATERALITY UNSPECIFIED, UNSPECIFIED BACK PAIN LATERALITY: Primary | ICD-10-CM

## 2023-03-23 DIAGNOSIS — M54.40 CHRONIC LOW BACK PAIN WITH SCIATICA, SCIATICA LATERALITY UNSPECIFIED, UNSPECIFIED BACK PAIN LATERALITY: Primary | ICD-10-CM

## 2023-03-23 DIAGNOSIS — F07.81 POST CONCUSSIVE SYNDROME: ICD-10-CM

## 2023-03-23 DIAGNOSIS — G89.29 CHRONIC NECK PAIN: ICD-10-CM

## 2023-03-23 DIAGNOSIS — M54.2 CHRONIC NECK PAIN: ICD-10-CM

## 2023-03-23 PROCEDURE — 99214 OFFICE O/P EST MOD 30 MIN: CPT | Performed by: PSYCHIATRY & NEUROLOGY

## 2023-03-23 ASSESSMENT — ENCOUNTER SYMPTOMS
ALLERGIC/IMMUNOLOGIC NEGATIVE: 1
BACK PAIN: 1
COUGH: 1
GASTROINTESTINAL NEGATIVE: 1
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
## 2023-05-22 ENCOUNTER — OFFICE VISIT (OUTPATIENT)
Dept: FAMILY MEDICINE CLINIC | Age: 53
End: 2023-05-22
Payer: MEDICAID

## 2023-05-22 VITALS
HEIGHT: 69 IN | SYSTOLIC BLOOD PRESSURE: 124 MMHG | DIASTOLIC BLOOD PRESSURE: 64 MMHG | WEIGHT: 208.4 LBS | HEART RATE: 97 BPM | BODY MASS INDEX: 30.87 KG/M2

## 2023-05-22 DIAGNOSIS — J45.20 MILD INTERMITTENT ASTHMA WITHOUT COMPLICATION: Primary | ICD-10-CM

## 2023-05-22 PROCEDURE — 99211 OFF/OP EST MAY X REQ PHY/QHP: CPT | Performed by: FAMILY MEDICINE

## 2023-05-22 PROCEDURE — 99213 OFFICE O/P EST LOW 20 MIN: CPT | Performed by: STUDENT IN AN ORGANIZED HEALTH CARE EDUCATION/TRAINING PROGRAM

## 2023-05-22 RX ORDER — ALBUTEROL SULFATE 2.5 MG/3ML
2.5 SOLUTION RESPIRATORY (INHALATION) 4 TIMES DAILY PRN
Qty: 60 EACH | Refills: 2 | Status: SHIPPED | OUTPATIENT
Start: 2023-05-22

## 2023-05-22 SDOH — ECONOMIC STABILITY: HOUSING INSECURITY
IN THE LAST 12 MONTHS, WAS THERE A TIME WHEN YOU DID NOT HAVE A STEADY PLACE TO SLEEP OR SLEPT IN A SHELTER (INCLUDING NOW)?: NO

## 2023-05-22 SDOH — ECONOMIC STABILITY: INCOME INSECURITY: HOW HARD IS IT FOR YOU TO PAY FOR THE VERY BASICS LIKE FOOD, HOUSING, MEDICAL CARE, AND HEATING?: NOT VERY HARD

## 2023-05-22 SDOH — ECONOMIC STABILITY: FOOD INSECURITY: WITHIN THE PAST 12 MONTHS, THE FOOD YOU BOUGHT JUST DIDN'T LAST AND YOU DIDN'T HAVE MONEY TO GET MORE.: NEVER TRUE

## 2023-05-22 SDOH — ECONOMIC STABILITY: FOOD INSECURITY: WITHIN THE PAST 12 MONTHS, YOU WORRIED THAT YOUR FOOD WOULD RUN OUT BEFORE YOU GOT MONEY TO BUY MORE.: NEVER TRUE

## 2023-05-22 ASSESSMENT — ANXIETY QUESTIONNAIRES
5. BEING SO RESTLESS THAT IT IS HARD TO SIT STILL: 0
5. BEING SO RESTLESS THAT IT IS HARD TO SIT STILL: 1
4. TROUBLE RELAXING: 0
2. NOT BEING ABLE TO STOP OR CONTROL WORRYING: 0
6. BECOMING EASILY ANNOYED OR IRRITABLE: 3
4. TROUBLE RELAXING: 0
1. FEELING NERVOUS, ANXIOUS, OR ON EDGE: 0
1. FEELING NERVOUS, ANXIOUS, OR ON EDGE: 0
6. BECOMING EASILY ANNOYED OR IRRITABLE: 3
2. NOT BEING ABLE TO STOP OR CONTROL WORRYING: 0
3. WORRYING TOO MUCH ABOUT DIFFERENT THINGS: 0
7. FEELING AFRAID AS IF SOMETHING AWFUL MIGHT HAPPEN: 0
3. WORRYING TOO MUCH ABOUT DIFFERENT THINGS: 1
GAD7 TOTAL SCORE: 5

## 2023-05-22 ASSESSMENT — PATIENT HEALTH QUESTIONNAIRE - PHQ9
2. FEELING DOWN, DEPRESSED OR HOPELESS: 0
SUM OF ALL RESPONSES TO PHQ QUESTIONS 1-9: 0
SUM OF ALL RESPONSES TO PHQ QUESTIONS 1-9: 0
1. LITTLE INTEREST OR PLEASURE IN DOING THINGS: 0
SUM OF ALL RESPONSES TO PHQ QUESTIONS 1-9: 0
SUM OF ALL RESPONSES TO PHQ QUESTIONS 1-9: 0
SUM OF ALL RESPONSES TO PHQ9 QUESTIONS 1 & 2: 0

## 2023-05-22 NOTE — PROGRESS NOTES
[address] [facility] - Outpatient Note      Subjective:      Jay Blas is a 45 y.o. male  presented to the office on 05/22/23 for anxiety and asthma follow-up. He has a history of anxiety and recently Zoloft was switched to Effexor 37.5 mg. He reports improvement in symptoms. His PHQ-2 score is 0 and JORGITO 7 score is 5. He denies SI. He mentions he has asthma since childhood and he uses albuterol inhaler as needed. Requesting for albuterol nebulization as sometimes he gets more short of breath while working in his garden. Mentions he has history of dander allergies. Denies chest pain or shortness of breath currently. Review of systems  Positive as mentioned above in subjective. All other systems negative. Objective:      Vitals:    05/22/23 1505   BP: 124/64   Pulse: 97       Physical exam:  General Appearance - Alert and oriented x 3  Lungs - Bilateral good air entry , no wheezes or rales  Cardiovascular - Regular rate and rhythm. No murmur      Assessment:        ICD-10-CM    1. Mild intermittent asthma without complication  F39.62 albuterol (PROVENTIL) (2.5 MG/3ML) 0.083% nebulizer solution     Full PFT Study With Bronchodilator          Plan:    Mild intermittent asthma. Albuterol nebulization solution given. Advised patient to do full PFT with bronchodilator response. Follow-up in 3 months. Anxiety well controlled on Effexor XR 37.5 mg daily. Advised to continue with same medication. Return in about 3 months (around 8/22/2023) for asthma with PFTs.        Requested Prescriptions     Signed Prescriptions Disp Refills    albuterol (PROVENTIL) (2.5 MG/3ML) 0.083% nebulizer solution 60 each 2     Sig: Take 3 mLs by nebulization 4 times daily as needed for Wheezing or Shortness of Breath       Medications Discontinued During This Encounter   Medication Reason    sertraline (ZOLOFT) 50 MG tablet LIST CLEANUP    nicotine (NICODERM CQ) 14

## 2023-05-22 NOTE — PATIENT INSTRUCTIONS
Thank you for letting us take care of you today. We hope all your questions were addressed. If a question was overlooked or something else comes to mind after you return home, please contact a member of your Care Team listed below. Your Care Team at [place] [address] is Team #3  Jeri Jamil MD (Faculty)  Eyad Leblanc MD (Faculty  Russdougie Becerra MD (Resident)  Nohemy Birch (Resident)   Sravan Jim MD (Resident)  Hilton Catalan., LAUREL Norwood., LAUREL Moscoso., PATITO Galvan., Amy Linda., Hola Davila ([address])  Peyton [facility], [address] [facility] (Clinical Practice Manager)  Lisa Leon [facility] (Clinical Pharmacist)     Office phone number: [phone]    If you need to get in right away due to illness, please be advised we have \"Same Day\" appointments available Monday-Friday. Please call us at [phone] option #3 to schedule your \"Same Day\" appointment.

## 2023-05-22 NOTE — PROGRESS NOTES
Visit Information    Have you changed or started any medications since your last visit including any over-the-counter medicines, vitamins, or herbal medicines? no   Have you stopped taking any of your medications? Is so, why? -  no  Are you having any side effects from any of your medications? - no    Have you seen any other physician or provider since your last visit?  no   Have you had any other diagnostic tests since your last visit?  no   Have you been seen in the emergency room and/or had an admission in a hospital since we last saw you?  no   Have you had your routine dental cleaning in the past 6 months?  no     Do you have an active MyChart account? If no, what is the barrier?   Yes    Patient Care Team:  Eugene Katz DO as PCP - General (Family Medicine)  Eugene Katz DO as PCP - Empaneled Provider    Medical History Review  Past Medical, Family, and Social History reviewed and does not contribute to the patient presenting condition    Health Maintenance   Topic Date Due    Hepatitis C screen  Never done    Shingles vaccine (1 of 2) Never done    COVID-19 Vaccine (4 - Booster for Kennieth Old series) 03/28/2022    Diabetes screen  01/11/2024    Depression Screen  01/20/2024    Colorectal Cancer Screen  02/08/2024    Lipids  09/16/2024    DTaP/Tdap/Td vaccine (3 - Td or Tdap) 11/27/2027    Flu vaccine  Completed    Pneumococcal 0-64 years Vaccine  Completed    HIV screen  Completed    Hepatitis A vaccine  Aged Out    Hib vaccine  Aged Out    Meningococcal (ACWY) vaccine  Aged Out

## 2023-05-22 NOTE — PROGRESS NOTES
Attending Physician Statement  I have discussed the care of [address] pertinent history and exam findings,  with the resident. I have reviewed the key elements of all parts of the encounter with the resident. I agree with the assessment, plan and orders as documented by the resident.   (GE Modifier)    Mild intermittent Asthma- controlled/PFT  JORGITO- continue with Effexor

## 2023-06-02 DIAGNOSIS — Z87.828 HISTORY OF MOTOR VEHICLE ACCIDENT: ICD-10-CM

## 2023-06-02 RX ORDER — VENLAFAXINE HYDROCHLORIDE 37.5 MG/1
37.5 CAPSULE, EXTENDED RELEASE ORAL DAILY
Qty: 30 CAPSULE | Refills: 5 | Status: SHIPPED | OUTPATIENT
Start: 2023-06-02
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
## 2023-07-19 RX ORDER — IBUPROFEN 800 MG/1
TABLET ORAL
Qty: 90 TABLET | Refills: 3 | OUTPATIENT
Start: 2023-07-19

## 2023-10-27 DIAGNOSIS — Z12.11 COLON CANCER SCREENING: Primary | ICD-10-CM

## 2023-12-05 ENCOUNTER — OFFICE VISIT (OUTPATIENT)
Dept: PAIN MANAGEMENT | Age: 53
End: 2023-12-05
Payer: MEDICARE

## 2023-12-05 VITALS — HEIGHT: 69 IN | BODY MASS INDEX: 30.81 KG/M2 | WEIGHT: 208 LBS

## 2023-12-05 DIAGNOSIS — M54.41 CHRONIC BILATERAL LOW BACK PAIN WITH RIGHT-SIDED SCIATICA: Primary | ICD-10-CM

## 2023-12-05 DIAGNOSIS — G89.29 CHRONIC BILATERAL LOW BACK PAIN WITH RIGHT-SIDED SCIATICA: Primary | ICD-10-CM

## 2023-12-05 DIAGNOSIS — M54.16 RADICULOPATHY, LUMBAR REGION: ICD-10-CM

## 2023-12-05 PROCEDURE — 4004F PT TOBACCO SCREEN RCVD TLK: CPT | Performed by: ANESTHESIOLOGY

## 2023-12-05 PROCEDURE — G8417 CALC BMI ABV UP PARAM F/U: HCPCS | Performed by: ANESTHESIOLOGY

## 2023-12-05 PROCEDURE — G8484 FLU IMMUNIZE NO ADMIN: HCPCS | Performed by: ANESTHESIOLOGY

## 2023-12-05 PROCEDURE — 3017F COLORECTAL CA SCREEN DOC REV: CPT | Performed by: ANESTHESIOLOGY

## 2023-12-05 PROCEDURE — 99213 OFFICE O/P EST LOW 20 MIN: CPT | Performed by: ANESTHESIOLOGY

## 2023-12-05 PROCEDURE — G8427 DOCREV CUR MEDS BY ELIG CLIN: HCPCS | Performed by: ANESTHESIOLOGY

## 2023-12-05 ASSESSMENT — ENCOUNTER SYMPTOMS
GASTROINTESTINAL NEGATIVE: 1
BACK PAIN: 1
RESPIRATORY NEGATIVE: 1

## 2023-12-05 NOTE — PROGRESS NOTES
Negative. Objective:  General Appearance:  Well-appearing, in no acute distress, uncomfortable and in pain. Vital signs: (most recent): Height 1.753 m (5' 9\"), weight 94.3 kg (208 lb). Vital signs are normal.  No fever. Output: Producing urine and producing stool. HEENT: Normal HEENT exam.    Lungs:  Normal effort and normal respiratory rate. He is not in respiratory distress. Heart: Normal rate. Extremities: Normal range of motion. There is no deformity. Neurological: Patient is alert and oriented to person, place and time. Normal strength. Patient has normal coordination. Pupils:  Pupils are equal, round, and reactive to light. Pupils are equal.   Skin:  Warm and dry. No rash or cyanosis. Lumbar spine exam  No apparent deformity on inspection  Range of motion preserved  Gait is stable  Motor strength 5/5 in both lower extremities in all major muscle group  Straight leg raise positive on right side    Assessment & Plan  Back pain  Chronic going on for more than 1 year  Located in the lumbar area reports radiation of pain down right leg all the way to the foot associated with intermittent numbness and tingling  No changes in bladder or bowel control  Pain aggravates with excessive activity bending twisting turning  He recently completed therapy with limited benefit  Patient tried NSAIDs and muscle relaxant  MRI lumbar spine at shown L5 level degenerative disc changes  Neurosurgery did not recommend any surgery at this time  Neurosurgery recommended epidural steroid injection  Clinical presentation suggest flareup of sciatica  Will schedule patient for lumbar epidural steroid injection    1. Chronic bilateral low back pain with right-sided sciatica    2. Radiculopathy, lumbar region        Orders Placed This Encounter   Procedures    AZ NJX DX/THER SBST INTRLMNR LMBR/SAC W/IMG GDN      No orders of the defined types were placed in this encounter.            Electronically signed

## 2024-01-16 ENCOUNTER — OFFICE VISIT (OUTPATIENT)
Dept: NEUROLOGY | Age: 54
End: 2024-01-16
Payer: MEDICARE

## 2024-01-16 VITALS
WEIGHT: 210 LBS | HEIGHT: 70 IN | SYSTOLIC BLOOD PRESSURE: 116 MMHG | BODY MASS INDEX: 30.06 KG/M2 | DIASTOLIC BLOOD PRESSURE: 71 MMHG

## 2024-01-16 DIAGNOSIS — M54.2 CHRONIC NECK PAIN: ICD-10-CM

## 2024-01-16 DIAGNOSIS — G89.29 CHRONIC LOW BACK PAIN WITH SCIATICA, SCIATICA LATERALITY UNSPECIFIED, UNSPECIFIED BACK PAIN LATERALITY: ICD-10-CM

## 2024-01-16 DIAGNOSIS — G89.29 CHRONIC NECK PAIN: ICD-10-CM

## 2024-01-16 DIAGNOSIS — M54.16 LUMBAR RADICULOPATHY: ICD-10-CM

## 2024-01-16 DIAGNOSIS — M54.40 CHRONIC LOW BACK PAIN WITH SCIATICA, SCIATICA LATERALITY UNSPECIFIED, UNSPECIFIED BACK PAIN LATERALITY: ICD-10-CM

## 2024-01-16 PROCEDURE — 3017F COLORECTAL CA SCREEN DOC REV: CPT | Performed by: PSYCHIATRY & NEUROLOGY

## 2024-01-16 PROCEDURE — G8484 FLU IMMUNIZE NO ADMIN: HCPCS | Performed by: PSYCHIATRY & NEUROLOGY

## 2024-01-16 PROCEDURE — 99214 OFFICE O/P EST MOD 30 MIN: CPT | Performed by: PSYCHIATRY & NEUROLOGY

## 2024-01-16 PROCEDURE — 4004F PT TOBACCO SCREEN RCVD TLK: CPT | Performed by: PSYCHIATRY & NEUROLOGY

## 2024-01-16 PROCEDURE — G8427 DOCREV CUR MEDS BY ELIG CLIN: HCPCS | Performed by: PSYCHIATRY & NEUROLOGY

## 2024-01-16 PROCEDURE — G8417 CALC BMI ABV UP PARAM F/U: HCPCS | Performed by: PSYCHIATRY & NEUROLOGY

## 2024-01-16 RX ORDER — GABAPENTIN 600 MG/1
600 TABLET ORAL 2 TIMES DAILY
Qty: 60 TABLET | Refills: 5 | Status: SHIPPED | OUTPATIENT
Start: 2024-01-16 | End: 2024-12-16

## 2024-01-16 RX ORDER — IBUPROFEN 800 MG/1
TABLET ORAL
Qty: 90 TABLET | Refills: 5 | Status: SHIPPED | OUTPATIENT
Start: 2024-01-16

## 2024-01-16 ASSESSMENT — ENCOUNTER SYMPTOMS
COUGH: 1
ALLERGIC/IMMUNOLOGIC NEGATIVE: 1
BACK PAIN: 1
GASTROINTESTINAL NEGATIVE: 1
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
## 2024-01-17 DIAGNOSIS — Z87.828 HISTORY OF MOTOR VEHICLE ACCIDENT: ICD-10-CM

## 2024-01-17 RX ORDER — VENLAFAXINE HYDROCHLORIDE 37.5 MG/1
37.5 CAPSULE, EXTENDED RELEASE ORAL DAILY
Qty: 30 CAPSULE | Refills: 5 | Status: SHIPPED | OUTPATIENT
Start: 2024-01-17

## 2024-01-17 NOTE — TELEPHONE ENCOUNTER
Last visit:   Last Med refill:   Does patient have enough medication for 72 hours: No:     Next Visit Date:  Future Appointments   Date Time Provider Department Center   1/23/2024  3:00 PM Halle Locke APRN - CNP [facility]   1/24/2024  2:00 PM Chriss Dias MD [facility]   1/29/2024 10:20 AM Karmen Eng APRN - CNP [facility]       Health Maintenance   Topic Date Due    Hepatitis B vaccine (1 of 3 - 3-dose series) Never done    Hepatitis C screen  Never done    Shingles vaccine (1 of 2) Never done    Pneumococcal 0-64 years Vaccine (2 - PCV) 10/02/2021    Flu vaccine (1) 08/01/2023    COVID-19 Vaccine (4 - 2023-24 season) 09/01/2023    Annual Wellness Visit (Medicare)  Never done    Diabetes screen  01/11/2024    Colorectal Cancer Screen  02/08/2024    Depression Screen  05/22/2024    Lipids  09/16/2024    DTaP/Tdap/Td vaccine (3 - Td or Tdap) 11/27/2027    HIV screen  Completed    Hepatitis A vaccine  Aged Out    Hib vaccine  Aged Out    Polio vaccine  Aged Out    Meningococcal (ACWY) vaccine  Aged Out       Hemoglobin A1C (%)   Date Value   01/11/2021 5.4   11/27/2017 5.2             ( goal A1C is < 7)   No components found for: \"LABMICR\"  LDL Cholesterol (mg/dL)   Date Value   09/16/2019 128   11/27/2017 184 (H)       (goal LDL is <100)   AST (U/L)   Date Value   09/16/2019 26     ALT (U/L)   Date Value   09/16/2019 31     BUN (mg/dL)   Date Value   09/16/2019 12     BP Readings from Last 3 Encounters:   01/16/24 116/71   05/22/23 124/64   03/23/23 113/69          (goal 120/80)    All Future Testing planned in [facility]  Lab Frequency Next Occurrence   Full PFT Study With Bronchodilator Once 05/22/2023   UT NJX DX/THER SBST INTRLMNR LMBR/SAC W/IMG GDN Once 12/06/2023               Patient Active Problem List:     Scalp laceration     Cervical transverse process fracture (HCC)     Closed nondisplaced fracture of sixth cervical vertebra (HCC)     Chronic neck pain     Hx of fusion

## 2024-01-24 ENCOUNTER — HOSPITAL ENCOUNTER (OUTPATIENT)
Dept: PAIN MANAGEMENT | Facility: CLINIC | Age: 54
Discharge: HOME | End: 2024-01-24
Payer: MEDICARE

## 2024-01-24 VITALS
HEART RATE: 80 BPM | BODY MASS INDEX: 30.06 KG/M2 | RESPIRATION RATE: 28 BRPM | WEIGHT: 210 LBS | HEIGHT: 70 IN | TEMPERATURE: 97.5 F | SYSTOLIC BLOOD PRESSURE: 130 MMHG | DIASTOLIC BLOOD PRESSURE: 66 MMHG | OXYGEN SATURATION: 95 %

## 2024-01-24 DIAGNOSIS — M54.41 CHRONIC BILATERAL LOW BACK PAIN WITH RIGHT-SIDED SCIATICA: Primary | ICD-10-CM

## 2024-01-24 DIAGNOSIS — R52 PAIN MANAGEMENT: ICD-10-CM

## 2024-01-24 DIAGNOSIS — G89.29 CHRONIC BILATERAL LOW BACK PAIN WITH RIGHT-SIDED SCIATICA: Primary | ICD-10-CM

## 2024-01-24 DIAGNOSIS — M54.16 LUMBAR RADICULOPATHY: Chronic | ICD-10-CM

## 2024-01-24 PROCEDURE — 62323 NJX INTERLAMINAR LMBR/SAC: CPT

## 2024-01-24 PROCEDURE — 6360000004 HC RX CONTRAST MEDICATION: Performed by: ANESTHESIOLOGY

## 2024-01-24 PROCEDURE — 2500000003 HC RX 250 WO HCPCS: Performed by: ANESTHESIOLOGY

## 2024-01-24 PROCEDURE — 62323 NJX INTERLAMINAR LMBR/SAC: CPT | Performed by: ANESTHESIOLOGY

## 2024-01-24 PROCEDURE — 2580000003 HC RX 258: Performed by: ANESTHESIOLOGY

## 2024-01-24 PROCEDURE — 6360000002 HC RX W HCPCS: Performed by: ANESTHESIOLOGY

## 2024-01-24 RX ORDER — LIDOCAINE HYDROCHLORIDE 10 MG/ML
INJECTION, SOLUTION EPIDURAL; INFILTRATION; INTRACAUDAL; PERINEURAL
Status: SHIPPED | OUTPATIENT
Start: 2024-01-24

## 2024-01-24 RX ORDER — SODIUM CHLORIDE 0.9 % (FLUSH) 0.9 %
SYRINGE (ML) INJECTION
Status: SHIPPED | OUTPATIENT
Start: 2024-01-24

## 2024-01-24 RX ORDER — LIDOCAINE HYDROCHLORIDE 10 MG/ML
INJECTION, SOLUTION EPIDURAL; INFILTRATION; INTRACAUDAL; PERINEURAL
Status: COMPLETED | OUTPATIENT
Start: 2024-01-24 | End: 2024-01-24

## 2024-01-24 RX ORDER — MIDAZOLAM HYDROCHLORIDE 2 MG/2ML
INJECTION, SOLUTION INTRAMUSCULAR; INTRAVENOUS
Status: SHIPPED | OUTPATIENT
Start: 2024-01-24

## 2024-01-24 RX ORDER — DEXAMETHASONE SODIUM PHOSPHATE 10 MG/ML
INJECTION, SOLUTION INTRAMUSCULAR; INTRAVENOUS
Status: SHIPPED | OUTPATIENT
Start: 2024-01-24

## 2024-01-24 RX ORDER — SODIUM CHLORIDE 0.9 % (FLUSH) 0.9 %
SYRINGE (ML) INJECTION
Status: COMPLETED | OUTPATIENT
Start: 2024-01-24 | End: 2024-01-24

## 2024-01-24 RX ORDER — FENTANYL CITRATE 50 UG/ML
INJECTION, SOLUTION INTRAMUSCULAR; INTRAVENOUS
Status: SHIPPED | OUTPATIENT
Start: 2024-01-24

## 2024-01-24 RX ORDER — DEXAMETHASONE SODIUM PHOSPHATE 10 MG/ML
INJECTION, SOLUTION INTRAMUSCULAR; INTRAVENOUS
Status: COMPLETED | OUTPATIENT
Start: 2024-01-24 | End: 2024-01-24

## 2024-01-24 RX ADMIN — DEXAMETHASONE SODIUM PHOSPHATE 10 MG: 10 INJECTION, SOLUTION INTRAMUSCULAR; INTRAVENOUS at 14:52

## 2024-01-24 RX ADMIN — LIDOCAINE HYDROCHLORIDE 5 ML: 10 INJECTION, SOLUTION EPIDURAL; INFILTRATION; INTRACAUDAL at 14:52

## 2024-01-24 RX ADMIN — IOHEXOL 3 ML: 180 INJECTION INTRAVENOUS at 14:52

## 2024-01-24 RX ADMIN — Medication 5 ML: at 14:52

## 2024-01-24 ASSESSMENT — PAIN DESCRIPTION - DESCRIPTORS: DESCRIPTORS: ACHING

## 2024-01-24 ASSESSMENT — PAIN - FUNCTIONAL ASSESSMENT
PAIN_FUNCTIONAL_ASSESSMENT: 0-10
PAIN_FUNCTIONAL_ASSESSMENT: ACTIVITIES ARE NOT PREVENTED
PAIN_FUNCTIONAL_ASSESSMENT: NONE - DENIES PAIN

## 2024-01-24 NOTE — DISCHARGE INSTRUCTIONS

## 2024-01-24 NOTE — H&P
Azithromycin      Unsure, upset stomach, labored breathing    Erythromycin          Current Outpatient Medications:     venlafaxine (EFFEXOR XR) 37.5 MG extended release capsule, TAKE 1 CAPSULE BY MOUTH DAILY, Disp: 30 capsule, Rfl: 5    gabapentin (NEURONTIN) 600 MG tablet, Take 1 tablet by mouth 2 times daily for 335 days., Disp: 60 tablet, Rfl: 5    ibuprofen (IBU) 800 MG tablet, Take 1 po tid PRN, Disp: 90 tablet, Rfl: 5    sertraline (ZOLOFT) 50 MG tablet, Take 1 tablet by mouth daily (Patient not taking: Reported on 2024), Disp: 30 tablet, Rfl: 0    albuterol (PROVENTIL) (2.5 MG/3ML) 0.083% nebulizer solution, Take 3 mLs by nebulization 4 times daily as needed for Wheezing or Shortness of Breath, Disp: 60 each, Rfl: 2    albuterol sulfate HFA (PROVENTIL;VENTOLIN;PROAIR) 108 (90 Base) MCG/ACT inhaler, Inhale 2 puffs into the lungs every 6 hours as needed for Wheezing, Disp: 18 g, Rfl: 3    Social History     Tobacco Use    Smoking status: Light Smoker     Current packs/day: 0.00     Average packs/day: 0.5 packs/day for 15.0 years (7.5 ttl pk-yrs)     Types: Cigarettes     Start date: 10/13/2021     Last attempt to quit: 2022     Years since quittin.5    Smokeless tobacco: Never    Tobacco comments:     will try Chantix again if insurance will cover   Substance Use Topics    Alcohol use: Yes     Comment: occasional drinker       Review of Systems:   Focused review of systems was performed, and negative as pertinent to diagnosis, except as stated in HPI.      Physical Exam  Constitutional:       Appearance: Normal appearance.   Pulmonary:      Effort: Pulmonary effort is normal.   Neurological:      Mental Status: alert.   Psychiatric:         Attention and Perception: Attention and perception normal.         Mood and Affect: Mood and affect normal.   Cardiovascular:      Rate: Normal rate.         ASA: 2          Mallampati: 2       Patient's current physical status, medications, medical history,

## 2024-01-24 NOTE — OP NOTE
Patient Name: Shay Smart   YOB: 1970  Room/Bed: Room/bed info not found  Medical Record Number: [id number]  Date: 1/24/2024       Sedation/ Anesthesia Plan:   intravenous sedation   as needed.    Medications Planned:   midazolam (Versed) / Fentanyl  Intravenously  as needed.    Preoperative Diagnosis:    1. Chronic bilateral low back pain with right-sided sciatica    2. Lumbar radiculopathy        Postoperative Diagnosis:    1. Chronic bilateral low back pain with right-sided sciatica    2. Lumbar radiculopathy        Procedure Performed:  Lumbar epidural steroid injection under fluoroscopy guidance    Blood Loss: None    Procedure:      The Patient was seen in the preop area, chart was reviewed, informed consent was obtained. Patient was taken to procedure room and was placed in prone position. Vital signs were monitored through out the  Procedure. A time out was completed.  The skin over the back was prepped and draped in sterile manner.     The target point was marked at The interlaminar space at L5/S1 . Skin and deep tissues were anesthetized with 1 % lidocaine.A 20-gauge Tuohy epidural needlele was advanced  under fluoroscopy guidance in AP view. Epidural space was identified using LEXIE technique. Position ws confirmed in Lateral view.   Then after negative aspiration contrast dye Omnipaque-180 was injected with live fluoroscopy in AP views that showed  spread of the contrast in the epidural space  and no vascular runoff or intrathecal spread. Finally 5 ml of treatment solution containing 4 ml of PF NS and 1 ml of DEXAMETHASONE 10 mg / ml was injected.  The needle was removed and a Band-Aid was placed over the needle  insertion site.  The patient's vital signs remained stable and the patient tolerated the procedure well.      Electronically signed by Chriss Disa MD on 1/24/2024 at 2:54 PM

## 2024-01-29 ENCOUNTER — OFFICE VISIT (OUTPATIENT)
Dept: PAIN MANAGEMENT | Age: 54
End: 2024-01-29
Payer: MEDICARE

## 2024-01-29 VITALS — WEIGHT: 210 LBS | HEART RATE: 86 BPM | OXYGEN SATURATION: 97 % | BODY MASS INDEX: 30.06 KG/M2 | HEIGHT: 70 IN

## 2024-01-29 DIAGNOSIS — M51.36 DDD (DEGENERATIVE DISC DISEASE), LUMBAR: ICD-10-CM

## 2024-01-29 DIAGNOSIS — M54.2 CHRONIC NECK PAIN: Chronic | ICD-10-CM

## 2024-01-29 DIAGNOSIS — G89.29 CHRONIC NECK PAIN: Chronic | ICD-10-CM

## 2024-01-29 DIAGNOSIS — G89.29 CHRONIC BILATERAL LOW BACK PAIN WITH RIGHT-SIDED SCIATICA: Primary | ICD-10-CM

## 2024-01-29 DIAGNOSIS — M47.812 CERVICAL FACET SYNDROME: ICD-10-CM

## 2024-01-29 DIAGNOSIS — M54.16 LUMBAR RADICULOPATHY: Chronic | ICD-10-CM

## 2024-01-29 DIAGNOSIS — M54.41 CHRONIC BILATERAL LOW BACK PAIN WITH RIGHT-SIDED SCIATICA: Primary | ICD-10-CM

## 2024-01-29 PROCEDURE — 4004F PT TOBACCO SCREEN RCVD TLK: CPT | Performed by: NURSE PRACTITIONER

## 2024-01-29 PROCEDURE — G8484 FLU IMMUNIZE NO ADMIN: HCPCS | Performed by: NURSE PRACTITIONER

## 2024-01-29 PROCEDURE — G8427 DOCREV CUR MEDS BY ELIG CLIN: HCPCS | Performed by: NURSE PRACTITIONER

## 2024-01-29 PROCEDURE — 3017F COLORECTAL CA SCREEN DOC REV: CPT | Performed by: NURSE PRACTITIONER

## 2024-01-29 PROCEDURE — 99214 OFFICE O/P EST MOD 30 MIN: CPT | Performed by: NURSE PRACTITIONER

## 2024-01-29 PROCEDURE — G8417 CALC BMI ABV UP PARAM F/U: HCPCS | Performed by: NURSE PRACTITIONER

## 2024-01-29 ASSESSMENT — ENCOUNTER SYMPTOMS
COUGH: 0
DIARRHEA: 0
CONSTIPATION: 0
VOMITING: 0
NAUSEA: 0
BACK PAIN: 1
SHORTNESS OF BREATH: 0

## 2024-01-29 NOTE — PROGRESS NOTES
to person, place, and time.   Psychiatric:         Mood and Affect: Mood normal.         Behavior: Behavior normal.         Assessment:  Problem List Items Addressed This Visit       Chronic neck pain (Chronic)    Lumbar radiculopathy (Chronic)    DDD (degenerative disc disease), lumbar    Chronic bilateral low back pain with right-sided sciatica - Primary    Cervical facet syndrome          Treatment Plan:    Patient relates significant relief from recent intervention   Follow up appointment made for 3 months     I have reviewed the chief complaint and history of present illness (including ROS and PFSH) and vital documentation by my staff and I agree with their documentation and have added where applicable.

## 2024-02-09 ENCOUNTER — OFFICE VISIT (OUTPATIENT)
Dept: FAMILY MEDICINE CLINIC | Age: 54
End: 2024-02-09

## 2024-02-09 VITALS
BODY MASS INDEX: 30.28 KG/M2 | SYSTOLIC BLOOD PRESSURE: 110 MMHG | WEIGHT: 211 LBS | TEMPERATURE: 98.3 F | HEART RATE: 86 BPM | DIASTOLIC BLOOD PRESSURE: 71 MMHG

## 2024-02-09 DIAGNOSIS — M67.911 TENDINOPATHY OF RIGHT ROTATOR CUFF: Primary | ICD-10-CM

## 2024-02-09 DIAGNOSIS — Z00.00 HEALTH CARE MAINTENANCE: ICD-10-CM

## 2024-02-09 DIAGNOSIS — Z76.0 MEDICATION REFILL: ICD-10-CM

## 2024-02-09 DIAGNOSIS — F17.200 SMOKING: ICD-10-CM

## 2024-02-09 LAB — HBA1C MFR BLD: 5.7 %

## 2024-02-09 RX ORDER — ALBUTEROL SULFATE 90 UG/1
2 AEROSOL, METERED RESPIRATORY (INHALATION) EVERY 6 HOURS PRN
Qty: 18 G | Refills: 3 | Status: SHIPPED | OUTPATIENT
Start: 2024-02-09

## 2024-02-09 RX ORDER — IBUPROFEN 800 MG/1
TABLET ORAL
Qty: 30 TABLET | Refills: 0 | Status: SHIPPED | OUTPATIENT
Start: 2024-02-09

## 2024-02-09 RX ORDER — ALBUTEROL SULFATE 2.5 MG/3ML
2.5 SOLUTION RESPIRATORY (INHALATION) 4 TIMES DAILY PRN
Qty: 60 EACH | Refills: 2 | Status: SHIPPED | OUTPATIENT
Start: 2024-02-09

## 2024-02-09 ASSESSMENT — ENCOUNTER SYMPTOMS
NAUSEA: 0
CONSTIPATION: 0
VOMITING: 0
DIARRHEA: 0
BACK PAIN: 1

## 2024-02-09 NOTE — PROGRESS NOTES
Attending Physician Statement  I  have discussed the care of Shay Smart including pertinent history and exam findings with the resident. I agree with the assessment, plan and orders as documented by the resident.      /71 (Site: Left Upper Arm, Position: Sitting, Cuff Size: Medium Adult)   Pulse 86   Temp 98.3 °F (36.8 °C) (Oral)   Wt 95.7 kg (211 lb)   BMI 30.28 kg/m²    BP Readings from Last 3 Encounters:   02/09/24 110/71   01/24/24 130/66   01/16/24 116/71     Wt Readings from Last 3 Encounters:   02/09/24 95.7 kg (211 lb)   01/29/24 95.3 kg (210 lb)   01/24/24 95.3 kg (210 lb)          Diagnosis Orders   1. Tendinopathy of right rotator cuff  diclofenac sodium (VOLTAREN) 1 % GEL    ibuprofen (IBU) 800 MG tablet      2. Smoking        3. Health care maintenance  POCT glycosylated hemoglobin (Hb A1C)      4. Medication refill  albuterol (PROVENTIL) (2.5 MG/3ML) 0.083% nebulizer solution    albuterol sulfate HFA (PROVENTIL;VENTOLIN;PROAIR) 108 (90 Base) MCG/ACT inhaler        Right shoulder pain  Onset 2 months ago  Endorses weakness on abduction  Possible rotator cuff tendinopathy of supraspinatous         Camilo Graf MD 2/9/2024 4:57 PM      
Visit Information    Have you changed or started any medications since your last visit including any over-the-counter medicines, vitamins, or herbal medicines? no   Have you stopped taking any of your medications? Is so, why? -  no  Are you having any side effects from any of your medications? - no    Have you seen any other physician or provider since your last visit?  no   Have you had any other diagnostic tests since your last visit?  no   Have you been seen in the emergency room and/or had an admission in a hospital since we last saw you?  no   Have you had your routine dental cleaning in the past 6 months?  no     Do you have an active MyChart account? If no, what is the barrier?  No:     Patient Care Team:  Lex Michel DO as PCP - General (Family Medicine)  Lex Michel DO as PCP - Empaneled Provider    Medical History Review  Past Medical, Family, and Social History reviewed and does not contribute to the patient presenting condition    Health Maintenance   Topic Date Due    Hepatitis B vaccine (1 of 3 - 3-dose series) Never done    Hepatitis C screen  Never done    Shingles vaccine (1 of 2) Never done    Pneumococcal 0-64 years Vaccine (2 - PCV) 10/02/2021    Flu vaccine (1) 08/01/2023    COVID-19 Vaccine (4 - 2023-24 season) 09/01/2023    Annual Wellness Visit (Medicare)  Never done    Diabetes screen  01/11/2024    Colorectal Cancer Screen  02/08/2024    Depression Screen  05/22/2024    Lipids  09/16/2024    DTaP/Tdap/Td vaccine (3 - Td or Tdap) 11/27/2027    HIV screen  Completed    Hepatitis A vaccine  Aged Out    Hib vaccine  Aged Out    Polio vaccine  Aged Out    Meningococcal (ACWY) vaccine  Aged Out             
  Musculoskeletal:  Positive for back pain. Negative for neck pain and neck stiffness.   Neurological:  Negative for dizziness, light-headedness and numbness.   Psychiatric/Behavioral:  Negative for agitation.        The patient has a   Family History   Problem Relation Age of Onset    Other Mother        Objective:    /71 (Site: Left Upper Arm, Position: Sitting, Cuff Size: Medium Adult)   Pulse 86   Temp 98.3 °F (36.8 °C) (Oral)   Wt 95.7 kg (211 lb)   BMI 30.28 kg/m²    BP Readings from Last 3 Encounters:   02/09/24 110/71   01/24/24 130/66   01/16/24 116/71       Physical Exam  Constitutional:       Appearance: He is obese.   HENT:      Head: Normocephalic and atraumatic.   Cardiovascular:      Rate and Rhythm: Normal rate and regular rhythm.      Pulses: Normal pulses.      Heart sounds: Normal heart sounds. No murmur heard.     No gallop.   Pulmonary:      Effort: Pulmonary effort is normal.      Breath sounds: No decreased air movement. Examination of the right-upper field reveals decreased breath sounds. Examination of the left-upper field reveals decreased breath sounds. Examination of the right-middle field reveals decreased breath sounds. Examination of the left-middle field reveals decreased breath sounds. Examination of the right-lower field reveals decreased breath sounds. Examination of the left-lower field reveals decreased breath sounds. Decreased breath sounds present. No wheezing, rhonchi or rales.   Abdominal:      General: Bowel sounds are normal.      Palpations: Abdomen is soft.      Tenderness: There is no abdominal tenderness.   Musculoskeletal:      Right shoulder: Tenderness present. No swelling, deformity or crepitus. Decreased range of motion. Normal strength. Normal pulse.      Left shoulder: Normal.      Right lower leg: No edema.      Left lower leg: No edema.      Comments: Abduction limited to 70 degrees, painful overhead abduction   Neurological:      Mental Status: He is

## 2024-02-09 NOTE — PATIENT INSTRUCTIONS
Thank you for letting us take care of you today. We hope all your questions were addressed. If a question was overlooked or something else comes to mind after you return home, please contact a member of your Care Team listed below.      Your Care Team at [facility] is Team #  Lex Michel, (Faculty)  Ivan Meza (Resident)  Husam Bangura (Resident)  Ivan Patel (Resident)   Vishal Mansfield (Resident)  Benny Alexander (Resident)  Tigist Monae., [facility]  Joselyn Austin., [facility]  Amparo Maki, [facility]  Radha Guzman, [facility]  Parker Alcazar, [facility]  Charlye Watson, [facility]  Bhargavi La, [facility]  Dorothy Camacho, [facility]  Fernando (LJ) LAUREL Parekh (Clinical Practice Manager)  Huyen Todd [facility] (Clinical Pharmacist)     Office phone number: [phone]    If you need to get in right away due to illness, please be advised we have \"Same Day\" appointments available Monday-Friday. Please call us at [phone] option #3 to schedule your \"Same Day\" appointment.

## 2024-02-19 ENCOUNTER — OFFICE VISIT (OUTPATIENT)
Dept: NEUROSURGERY | Age: 54
End: 2024-02-19
Payer: MEDICARE

## 2024-02-19 VITALS
HEIGHT: 70 IN | DIASTOLIC BLOOD PRESSURE: 70 MMHG | HEART RATE: 85 BPM | SYSTOLIC BLOOD PRESSURE: 121 MMHG | WEIGHT: 213.2 LBS | BODY MASS INDEX: 30.52 KG/M2

## 2024-02-19 DIAGNOSIS — M47.812 CERVICAL SPONDYLOSIS: ICD-10-CM

## 2024-02-19 DIAGNOSIS — M47.816 LUMBAR SPONDYLOSIS: Primary | ICD-10-CM

## 2024-02-19 PROCEDURE — G8417 CALC BMI ABV UP PARAM F/U: HCPCS | Performed by: NURSE PRACTITIONER

## 2024-02-19 PROCEDURE — 3017F COLORECTAL CA SCREEN DOC REV: CPT | Performed by: NURSE PRACTITIONER

## 2024-02-19 PROCEDURE — 4004F PT TOBACCO SCREEN RCVD TLK: CPT | Performed by: NURSE PRACTITIONER

## 2024-02-19 PROCEDURE — G8484 FLU IMMUNIZE NO ADMIN: HCPCS | Performed by: NURSE PRACTITIONER

## 2024-02-19 PROCEDURE — 99213 OFFICE O/P EST LOW 20 MIN: CPT | Performed by: NURSE PRACTITIONER

## 2024-02-19 PROCEDURE — G8427 DOCREV CUR MEDS BY ELIG CLIN: HCPCS | Performed by: NURSE PRACTITIONER
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
## 2024-02-29 LAB — NONINV COLON CA DNA+OCC BLD SCRN STL QL: NEGATIVE

## 2024-03-04 ENCOUNTER — TELEPHONE (OUTPATIENT)
Dept: FAMILY MEDICINE CLINIC | Age: 54
End: 2024-03-04

## 2024-03-04 NOTE — TELEPHONE ENCOUNTER
----- Message from Lex Michel DO sent at 3/1/2024  2:18 PM EST -----  Cologuard - Results reviewed are normal.    Please advise patient.

## 2024-05-23 ENCOUNTER — TELEPHONE (OUTPATIENT)
Dept: PRIMARY CARE CLINIC | Age: 54
End: 2024-05-23

## 2024-05-23 NOTE — TELEPHONE ENCOUNTER
Tried to contact patient to schedule AWV, could hear someone answer phone but nobody would say anything.

## 2024-07-16 DIAGNOSIS — Z87.828 HISTORY OF MOTOR VEHICLE ACCIDENT: ICD-10-CM

## 2024-07-16 RX ORDER — VENLAFAXINE HYDROCHLORIDE 37.5 MG/1
37.5 CAPSULE, EXTENDED RELEASE ORAL DAILY
Qty: 90 CAPSULE | Refills: 0 | Status: SHIPPED | OUTPATIENT
Start: 2024-07-16 | End: 2024-07-19 | Stop reason: SDUPTHER
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
## 2024-07-19 ENCOUNTER — OFFICE VISIT (OUTPATIENT)
Dept: FAMILY MEDICINE CLINIC | Age: 54
End: 2024-07-19
Payer: MEDICARE

## 2024-07-19 VITALS
BODY MASS INDEX: 30.55 KG/M2 | SYSTOLIC BLOOD PRESSURE: 139 MMHG | HEIGHT: 70 IN | OXYGEN SATURATION: 96 % | DIASTOLIC BLOOD PRESSURE: 74 MMHG | WEIGHT: 213.4 LBS | HEART RATE: 95 BPM

## 2024-07-19 DIAGNOSIS — Z76.0 MEDICATION REFILL: ICD-10-CM

## 2024-07-19 DIAGNOSIS — F32.A DEPRESSION, UNSPECIFIED DEPRESSION TYPE: ICD-10-CM

## 2024-07-19 DIAGNOSIS — F17.210 CIGARETTE SMOKER: ICD-10-CM

## 2024-07-19 DIAGNOSIS — J45.20 MILD INTERMITTENT ASTHMA WITHOUT COMPLICATION: Primary | ICD-10-CM

## 2024-07-19 PROCEDURE — 4004F PT TOBACCO SCREEN RCVD TLK: CPT

## 2024-07-19 PROCEDURE — 3017F COLORECTAL CA SCREEN DOC REV: CPT

## 2024-07-19 PROCEDURE — G8417 CALC BMI ABV UP PARAM F/U: HCPCS

## 2024-07-19 PROCEDURE — G8427 DOCREV CUR MEDS BY ELIG CLIN: HCPCS

## 2024-07-19 PROCEDURE — G0444 DEPRESSION SCREEN ANNUAL: HCPCS

## 2024-07-19 PROCEDURE — 99213 OFFICE O/P EST LOW 20 MIN: CPT

## 2024-07-19 RX ORDER — VENLAFAXINE HYDROCHLORIDE 37.5 MG/1
37.5 CAPSULE, EXTENDED RELEASE ORAL DAILY
Qty: 90 CAPSULE | Refills: 0 | Status: SHIPPED | OUTPATIENT
Start: 2024-07-19

## 2024-07-23 ASSESSMENT — ENCOUNTER SYMPTOMS
CONSTIPATION: 0
SHORTNESS OF BREATH: 0
ABDOMINAL PAIN: 0
WHEEZING: 0
NAUSEA: 0
COUGH: 0
STRIDOR: 0
DIARRHEA: 0

## 2024-08-12 ENCOUNTER — TELEPHONE (OUTPATIENT)
Dept: FAMILY MEDICINE CLINIC | Age: 54
End: 2024-08-12

## 2024-08-20 ENCOUNTER — TELEPHONE (OUTPATIENT)
Dept: FAMILY MEDICINE CLINIC | Age: 54
End: 2024-08-20

## 2024-08-30 ENCOUNTER — OFFICE VISIT (OUTPATIENT)
Dept: FAMILY MEDICINE CLINIC | Age: 54
End: 2024-08-30
Payer: MEDICARE

## 2024-08-30 VITALS
SYSTOLIC BLOOD PRESSURE: 123 MMHG | HEART RATE: 86 BPM | HEIGHT: 68 IN | DIASTOLIC BLOOD PRESSURE: 82 MMHG | WEIGHT: 214.6 LBS | BODY MASS INDEX: 32.52 KG/M2

## 2024-08-30 DIAGNOSIS — Z00.00 ENCOUNTER FOR ANNUAL WELLNESS VISIT (AWV) IN MEDICARE PATIENT: ICD-10-CM

## 2024-08-30 DIAGNOSIS — Z00.00 INITIAL MEDICARE ANNUAL WELLNESS VISIT: Primary | ICD-10-CM

## 2024-08-30 PROCEDURE — 99211 OFF/OP EST MAY X REQ PHY/QHP: CPT | Performed by: STUDENT IN AN ORGANIZED HEALTH CARE EDUCATION/TRAINING PROGRAM

## 2024-08-30 PROCEDURE — G0439 PPPS, SUBSEQ VISIT: HCPCS | Performed by: STUDENT IN AN ORGANIZED HEALTH CARE EDUCATION/TRAINING PROGRAM

## 2024-08-30 PROCEDURE — G0438 PPPS, INITIAL VISIT: HCPCS | Performed by: STUDENT IN AN ORGANIZED HEALTH CARE EDUCATION/TRAINING PROGRAM

## 2024-08-30 SDOH — ECONOMIC STABILITY: FOOD INSECURITY: WITHIN THE PAST 12 MONTHS, THE FOOD YOU BOUGHT JUST DIDN'T LAST AND YOU DIDN'T HAVE MONEY TO GET MORE.: NEVER TRUE

## 2024-08-30 SDOH — ECONOMIC STABILITY: FOOD INSECURITY: WITHIN THE PAST 12 MONTHS, YOU WORRIED THAT YOUR FOOD WOULD RUN OUT BEFORE YOU GOT MONEY TO BUY MORE.: NEVER TRUE

## 2024-08-30 SDOH — ECONOMIC STABILITY: INCOME INSECURITY: HOW HARD IS IT FOR YOU TO PAY FOR THE VERY BASICS LIKE FOOD, HOUSING, MEDICAL CARE, AND HEATING?: NOT HARD AT ALL

## 2024-08-30 ASSESSMENT — LIFESTYLE VARIABLES
HOW OFTEN DO YOU HAVE A DRINK CONTAINING ALCOHOL: 2-4 TIMES A MONTH
HAS A RELATIVE, FRIEND, DOCTOR, OR ANOTHER HEALTH PROFESSIONAL EXPRESSED CONCERN ABOUT YOUR DRINKING OR SUGGESTED YOU CUT DOWN: NO
HAVE YOU OR SOMEONE ELSE BEEN INJURED AS A RESULT OF YOUR DRINKING: NO
HOW OFTEN DURING THE LAST YEAR HAVE YOU HAD A FEELING OF GUILT OR REMORSE AFTER DRINKING: NEVER
HOW MANY STANDARD DRINKS CONTAINING ALCOHOL DO YOU HAVE ON A TYPICAL DAY: 3 OR 4
HOW OFTEN DURING THE LAST YEAR HAVE YOU FAILED TO DO WHAT WAS NORMALLY EXPECTED FROM YOU BECAUSE OF DRINKING: NEVER
HOW OFTEN DURING THE LAST YEAR HAVE YOU BEEN UNABLE TO REMEMBER WHAT HAPPENED THE NIGHT BEFORE BECAUSE YOU HAD BEEN DRINKING: NEVER
HOW OFTEN DURING THE LAST YEAR HAVE YOU FOUND THAT YOU WERE NOT ABLE TO STOP DRINKING ONCE YOU HAD STARTED: NEVER
HOW OFTEN DURING THE LAST YEAR HAVE YOU NEEDED AN ALCOHOLIC DRINK FIRST THING IN THE MORNING TO GET YOURSELF GOING AFTER A NIGHT OF HEAVY DRINKING: NEVER

## 2024-08-30 ASSESSMENT — PATIENT HEALTH QUESTIONNAIRE - PHQ9
SUM OF ALL RESPONSES TO PHQ QUESTIONS 1-9: 3
5. POOR APPETITE OR OVEREATING: SEVERAL DAYS
7. TROUBLE CONCENTRATING ON THINGS, SUCH AS READING THE NEWSPAPER OR WATCHING TELEVISION: NOT AT ALL
SUM OF ALL RESPONSES TO PHQ QUESTIONS 1-9: 3
2. FEELING DOWN, DEPRESSED OR HOPELESS: NOT AT ALL
10. IF YOU CHECKED OFF ANY PROBLEMS, HOW DIFFICULT HAVE THESE PROBLEMS MADE IT FOR YOU TO DO YOUR WORK, TAKE CARE OF THINGS AT HOME, OR GET ALONG WITH OTHER PEOPLE: NOT DIFFICULT AT ALL
6. FEELING BAD ABOUT YOURSELF - OR THAT YOU ARE A FAILURE OR HAVE LET YOURSELF OR YOUR FAMILY DOWN: NOT AT ALL
4. FEELING TIRED OR HAVING LITTLE ENERGY: NOT AT ALL
SUM OF ALL RESPONSES TO PHQ9 QUESTIONS 1 & 2: 0
9. THOUGHTS THAT YOU WOULD BE BETTER OFF DEAD, OR OF HURTING YOURSELF: NOT AT ALL
3. TROUBLE FALLING OR STAYING ASLEEP: SEVERAL DAYS
8. MOVING OR SPEAKING SO SLOWLY THAT OTHER PEOPLE COULD HAVE NOTICED. OR THE OPPOSITE, BEING SO FIGETY OR RESTLESS THAT YOU HAVE BEEN MOVING AROUND A LOT MORE THAN USUAL: SEVERAL DAYS
1. LITTLE INTEREST OR PLEASURE IN DOING THINGS: NOT AT ALL

## 2024-08-30 NOTE — PATIENT INSTRUCTIONS
Advance Directives: Care Instructions  Overview  An advance directive is a legal way to state your wishes at the end of your life. It tells your family and your doctor what to do if you can't say what you want.  There are two main types of advance directives. You can change them any time your wishes change.  Living will.  This form tells your family and your doctor your wishes about life support and other treatment. The form is also called a declaration.  Medical power of .  This form lets you name a person to make treatment decisions for you when you can't speak for yourself. This person is called a health care agent (health care proxy, health care surrogate). The form is also called a durable power of  for health care.  If you do not have an advance directive, decisions about your medical care may be made by a family member, or by a doctor or a  who doesn't know you.  It may help to think of an advance directive as a gift to the people who care for you. If you have one, they won't have to make tough decisions by themselves.  For more information, including forms for your state, see the [employer] website (www.caringinfo.org/planning/advance-directives/).  Follow-up care is a key part of your treatment and safety. Be sure to make and go to all appointments, and call your doctor if you are having problems. It's also a good idea to know your test results and keep a list of the medicines you take.  What should you include in an advance directive?  Many states have a unique advance directive form. (It may ask you to address specific issues.) Or you might use a universal form that's approved by many states.  If your form doesn't tell you what to address, it may be hard to know what to include in your advance directive. Use the questions below to help you get started.  Who do you want to make decisions about your medical care if you are not able to?  What life-support measures do you want if you  preventive dental visit is recommended every 6 months.  Try to get at least 150 minutes of exercise per week or 10,000 steps per day on a pedometer .  Order or download the FREE \"Exercise & Physical Activity: Your Everyday Guide\" from [employer]. Call 1-848.499.2930 or search [employer] online.  You need 1001-0537 mg of calcium and 4848-0014 IU of vitamin D per day. It is possible to meet your calcium requirement with diet alone, but a vitamin D supplement is usually necessary to meet this goal.  When exposed to the sun, use a sunscreen that protects against both UVA and UVB radiation with an SPF of 30 or greater. Reapply every 2 to 3 hours or after sweating, drying off with a towel, or swimming.  Always wear a seat belt when traveling in a car. Always wear a helmet when riding a bicycle or motorcycle.

## 2024-08-30 NOTE — PROGRESS NOTES
Medicare Annual Wellness Visit    Shay Smart is here for Medicare AWV    Assessment & Plan     Recommendations for Preventive Services Due: see orders and patient instructions/AVS.  Recommended screening schedule for the next 5-10 years is provided to the patient in written form: see Patient Instructions/AVS.     No follow-ups on file.     Subjective       Patient's complete Health Risk Assessment and screening values have been reviewed and are found in Flowsheets. The following problems were reviewed today and where indicated follow up appointments were made and/or referrals ordered.    Positive Risk Factor Screenings with Interventions:                Inactivity:  On average, how many days per week do you engage in moderate to strenuous exercise (like a brisk walk)?: 0 days (!) Abnormal  On average, how many minutes do you engage in exercise at this level?: 0 min    Interventions:  Patient declined any further interventions or treatment    Poor Eating Habits/Diet:  Do you eat balanced/healthy meals regularly?: (!) No    Interventions:  Patient declines any further evaluation or treatment    Abnormal BMI (obese):  Body mass index is 32.63 kg/m². (!) Abnormal    Interventions:  Patient declines any further evaluation or treatment           Safety:  Do you have non-slip mats or non-slip surfaces or shower bars or grab bars in your shower or bathtub?: (!) No    Interventions:  Patient declined any further interventions or treatment     Advanced Directives:  Do you have a Living Will?: (!) No    Intervention:  has NO advanced directive - information provided        Tobacco Use:    Tobacco Use      Smoking status: Light Smoker        Packs/day: 0.00        Years: 0.5 packs/day for 15.0 years (7.5 ttl pk-yrs)        Types: Cigarettes        Start date: 10/13/2021        Last attempt to quit: 2022        Years since quittin.1        Passive exposure: Current      Smokeless tobacco: Never      Tobacco

## 2024-10-24 ENCOUNTER — OFFICE VISIT (OUTPATIENT)
Dept: FAMILY MEDICINE CLINIC | Age: 54
End: 2024-10-24
Payer: MEDICARE

## 2024-10-24 VITALS
WEIGHT: 221.4 LBS | BODY MASS INDEX: 33.56 KG/M2 | OXYGEN SATURATION: 97 % | DIASTOLIC BLOOD PRESSURE: 65 MMHG | TEMPERATURE: 98.8 F | SYSTOLIC BLOOD PRESSURE: 112 MMHG | HEIGHT: 68 IN | HEART RATE: 76 BPM

## 2024-10-24 DIAGNOSIS — Z23 IMMUNIZATION DUE: ICD-10-CM

## 2024-10-24 DIAGNOSIS — Z13.220 SCREENING FOR HYPERLIPIDEMIA: Primary | ICD-10-CM

## 2024-10-24 DIAGNOSIS — F17.200 SMOKER: ICD-10-CM

## 2024-10-24 DIAGNOSIS — M54.41 CHRONIC BILATERAL LOW BACK PAIN WITH RIGHT-SIDED SCIATICA: ICD-10-CM

## 2024-10-24 DIAGNOSIS — G89.29 CHRONIC BILATERAL LOW BACK PAIN WITH RIGHT-SIDED SCIATICA: ICD-10-CM

## 2024-10-24 PROBLEM — S01.01XA SCALP LACERATION: Status: RESOLVED | Noted: 2017-09-06 | Resolved: 2024-10-24

## 2024-10-24 PROCEDURE — 90677 PCV20 VACCINE IM: CPT | Performed by: STUDENT IN AN ORGANIZED HEALTH CARE EDUCATION/TRAINING PROGRAM

## 2024-10-24 PROCEDURE — 90656 IIV3 VACC NO PRSV 0.5 ML IM: CPT

## 2024-10-24 PROCEDURE — 99211 OFF/OP EST MAY X REQ PHY/QHP: CPT | Performed by: STUDENT IN AN ORGANIZED HEALTH CARE EDUCATION/TRAINING PROGRAM

## 2024-10-24 ASSESSMENT — PATIENT HEALTH QUESTIONNAIRE - PHQ9
2. FEELING DOWN, DEPRESSED OR HOPELESS: NOT AT ALL
SUM OF ALL RESPONSES TO PHQ QUESTIONS 1-9: 2
1. LITTLE INTEREST OR PLEASURE IN DOING THINGS: NOT AT ALL
SUM OF ALL RESPONSES TO PHQ QUESTIONS 1-9: 2
7. TROUBLE CONCENTRATING ON THINGS, SUCH AS READING THE NEWSPAPER OR WATCHING TELEVISION: SEVERAL DAYS
SUM OF ALL RESPONSES TO PHQ QUESTIONS 1-9: 2
10. IF YOU CHECKED OFF ANY PROBLEMS, HOW DIFFICULT HAVE THESE PROBLEMS MADE IT FOR YOU TO DO YOUR WORK, TAKE CARE OF THINGS AT HOME, OR GET ALONG WITH OTHER PEOPLE: NOT DIFFICULT AT ALL
6. FEELING BAD ABOUT YOURSELF - OR THAT YOU ARE A FAILURE OR HAVE LET YOURSELF OR YOUR FAMILY DOWN: NOT AT ALL
5. POOR APPETITE OR OVEREATING: SEVERAL DAYS
SUM OF ALL RESPONSES TO PHQ QUESTIONS 1-9: 2
SUM OF ALL RESPONSES TO PHQ9 QUESTIONS 1 & 2: 0
4. FEELING TIRED OR HAVING LITTLE ENERGY: NOT AT ALL
3. TROUBLE FALLING OR STAYING ASLEEP: NOT AT ALL
8. MOVING OR SPEAKING SO SLOWLY THAT OTHER PEOPLE COULD HAVE NOTICED. OR THE OPPOSITE, BEING SO FIGETY OR RESTLESS THAT YOU HAVE BEEN MOVING AROUND A LOT MORE THAN USUAL: NOT AT ALL
9. THOUGHTS THAT YOU WOULD BE BETTER OFF DEAD, OR OF HURTING YOURSELF: NOT AT ALL

## 2024-10-24 ASSESSMENT — ENCOUNTER SYMPTOMS: BACK PAIN: 1

## 2024-10-24 NOTE — PATIENT INSTRUCTIONS
Thank you for letting us take care of you today. We hope all your questions were addressed. If a question was overlooked or something else comes to mind after you return home, please contact a member of your Care Team listed below.      Your Care Team at [facility] is Team #  Wilson Troncoso M.D. (Faculty)  Don Girard M.D. (Resident)  Ivan Meza M.D. (Resident)   Joanie Weber M.D. (Resident)  Randall Coelho M.D. (Resident)  Inocencia Cano M.D. (Resident)  Joselyn Austin., [facility]  Amparo Maki, [facility]  Radha Guzman, [facility]  Parker Alcazar, MILES Watson, [facility]  Bhargavi La, [facility]  Dorothy Camacho, MILES King (LJ) LAUREL Parekh (Clinical Practice Manager)  Huyen Todd [facility] (Clinical Pharmacist)     Office phone number: [phone]    If you need to get in right away due to illness, please be advised we have \"Same Day\" appointments available Monday-Friday. Please call us at [phone] option #3 to schedule your \"Same Day\" appointment.

## 2024-10-24 NOTE — PROGRESS NOTES
Attending Physician Statement  I  have discussed the care of Shay Smart including pertinent history and exam findings with the resident. I agree with the assessment, plan and orders as documented by the resident.      /65 (Site: Right Upper Arm, Position: Sitting, Cuff Size: Medium Adult)   Pulse 76   Temp 98.8 °F (37.1 °C) (Oral)   Ht 1.727 m (5' 7.99\")   Wt 100.4 kg (221 lb 6.4 oz)   SpO2 97%   BMI 33.67 kg/m²    BP Readings from Last 3 Encounters:   10/24/24 112/65   08/30/24 123/82   07/19/24 139/74     Wt Readings from Last 3 Encounters:   10/24/24 100.4 kg (221 lb 6.4 oz)   08/30/24 97.3 kg (214 lb 9.6 oz)   07/19/24 96.8 kg (213 lb 6.4 oz)          Diagnosis Orders   1. Screening for hyperlipidemia  Lipid Panel      2. Chronic bilateral low back pain with right-sided sciatica        3. Smoker        4. Immunization due  Influenza, AFLURIA Trivalent, (age 3 y+), IM, Preservative Free, 0.5mL    Pneumococcal, PCV20, PREVNAR 20, (age 6w+), IM, PF          Smoking 7.5 pack year history- PFT not done, will schedule. Will adjust inhaler based on PFT to assess for possible COPD overlap.       Paul Parkinson DO 10/25/2024 11:42 AM

## 2024-10-24 NOTE — PROGRESS NOTES
Visit Information    Have you changed or started any medications since your last visit including any over-the-counter medicines, vitamins, or herbal medicines? no   Have you stopped taking any of your medications? Is so, why? -  no  Are you having any side effects from any of your medications? - no    Have you seen any other physician or provider since your last visit?  no   Have you had any other diagnostic tests since your last visit?  no   Have you been seen in the emergency room and/or had an admission in a hospital since we last saw you?  no   Have you had your routine dental cleaning in the past 6 months?  no     Do you have an active MyChart account? If no, what is the barrier?  Yes    Patient Care Team:  Ivan Meza MD as PCP - General (Family Medicine)  Paul Parkinson DO as PCP - Empaneled Provider    Medical History Review  Past Medical, Family, and Social History reviewed and does not contribute to the patient presenting condition    Health Maintenance   Topic Date Due    Hepatitis C screen  Never done    Hepatitis B vaccine (1 of 3 - 19+ 3-dose series) Never done    Shingles vaccine (1 of 2) Never done    Pneumococcal 0-64 years Vaccine (2 of 2 - PCV) 10/02/2021    Flu vaccine (1) 08/01/2024    COVID-19 Vaccine (5 - 2023-24 season) 09/01/2024    Lipids  09/16/2024    A1C test (Diabetic or Prediabetic)  02/09/2025    Depression Monitoring  08/30/2025    Annual Wellness Visit (Medicare)  08/31/2025    Colorectal Cancer Screen  02/14/2027    DTaP/Tdap/Td vaccine (3 - Td or Tdap) 11/27/2027    HIV screen  Completed    Hepatitis A vaccine  Aged Out    Hib vaccine  Aged Out    Polio vaccine  Aged Out    Meningococcal (ACWY) vaccine  Aged Out    Depression Screen  Discontinued    Diabetes screen  Discontinued
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
## 2024-12-12 ENCOUNTER — HOSPITAL ENCOUNTER (OUTPATIENT)
Dept: PULMONOLOGY | Age: 54
Discharge: HOME | End: 2024-12-12
Payer: MEDICARE

## 2024-12-12 DIAGNOSIS — J45.20 MILD INTERMITTENT ASTHMA WITHOUT COMPLICATION: ICD-10-CM

## 2024-12-12 PROCEDURE — 94060 EVALUATION OF WHEEZING: CPT

## 2024-12-12 PROCEDURE — 6370000000 HC RX 637 (ALT 250 FOR IP)

## 2024-12-12 PROCEDURE — 94726 PLETHYSMOGRAPHY LUNG VOLUMES: CPT

## 2024-12-12 PROCEDURE — 94729 DIFFUSING CAPACITY: CPT

## 2024-12-12 RX ORDER — ALBUTEROL SULFATE 90 UG/1
2 INHALANT RESPIRATORY (INHALATION) ONCE
Status: COMPLETED | OUTPATIENT
Start: 2024-12-12 | End: 2024-12-12

## 2024-12-12 RX ADMIN — ALBUTEROL SULFATE 2 PUFF: 90 AEROSOL, METERED RESPIRATORY (INHALATION) at 09:15

## 2024-12-21 DIAGNOSIS — Z76.0 MEDICATION REFILL: ICD-10-CM
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
## 2024-12-24 RX ORDER — VENLAFAXINE HYDROCHLORIDE 37.5 MG/1
37.5 CAPSULE, EXTENDED RELEASE ORAL DAILY
Qty: 90 CAPSULE | Refills: 0 | Status: SHIPPED | OUTPATIENT
Start: 2024-12-24

## 2025-01-20 DIAGNOSIS — G89.29 CHRONIC NECK PAIN: ICD-10-CM

## 2025-01-20 DIAGNOSIS — G89.29 CHRONIC LOW BACK PAIN WITH SCIATICA, SCIATICA LATERALITY UNSPECIFIED, UNSPECIFIED BACK PAIN LATERALITY: ICD-10-CM

## 2025-01-20 DIAGNOSIS — M54.16 LUMBAR RADICULOPATHY: ICD-10-CM

## 2025-01-20 DIAGNOSIS — M54.40 CHRONIC LOW BACK PAIN WITH SCIATICA, SCIATICA LATERALITY UNSPECIFIED, UNSPECIFIED BACK PAIN LATERALITY: ICD-10-CM

## 2025-01-20 DIAGNOSIS — M54.2 CHRONIC NECK PAIN: ICD-10-CM

## 2025-01-20 RX ORDER — GABAPENTIN 600 MG/1
600 TABLET ORAL 2 TIMES DAILY
Qty: 60 TABLET | Refills: 5 | Status: SHIPPED | OUTPATIENT
Start: 2025-01-20 | End: 2025-07-19

## 2025-01-20 NOTE — TELEPHONE ENCOUNTER
Pharmacy requesting refill of Gabapentin 600 mg tablet.    Medication active on med list yes    Date of last Rx: 1/16/2024 #60 with 5 refills   verified by MARICARMEN Atkinson    Date of last appointment 1/16/2024    Next Visit Date:  4/9/2025

## 2025-03-06 ENCOUNTER — OFFICE VISIT (OUTPATIENT)
Dept: FAMILY MEDICINE CLINIC | Age: 55
End: 2025-03-06
Payer: MEDICARE

## 2025-03-06 VITALS
DIASTOLIC BLOOD PRESSURE: 68 MMHG | HEIGHT: 68 IN | BODY MASS INDEX: 32.25 KG/M2 | TEMPERATURE: 97.5 F | HEART RATE: 80 BPM | OXYGEN SATURATION: 97 % | SYSTOLIC BLOOD PRESSURE: 122 MMHG | WEIGHT: 212.8 LBS

## 2025-03-06 DIAGNOSIS — Z13.220 SCREENING FOR HYPERLIPIDEMIA: ICD-10-CM

## 2025-03-06 DIAGNOSIS — F17.210 CIGARETTE NICOTINE DEPENDENCE WITHOUT COMPLICATION: Primary | ICD-10-CM

## 2025-03-06 DIAGNOSIS — K21.9 GASTROESOPHAGEAL REFLUX DISEASE WITHOUT ESOPHAGITIS: ICD-10-CM

## 2025-03-06 DIAGNOSIS — Z13.1 SCREENING FOR DIABETES MELLITUS (DM): ICD-10-CM

## 2025-03-06 LAB — HBA1C MFR BLD: 5.7 %

## 2025-03-06 PROCEDURE — 83036 HEMOGLOBIN GLYCOSYLATED A1C: CPT

## 2025-03-06 PROCEDURE — 99211 OFF/OP EST MAY X REQ PHY/QHP: CPT | Performed by: FAMILY MEDICINE

## 2025-03-06 RX ORDER — IBUPROFEN 800 MG/1
TABLET, FILM COATED ORAL
Qty: 30 TABLET | Refills: 0 | Status: SHIPPED | OUTPATIENT
Start: 2025-03-06

## 2025-03-06 RX ORDER — VARENICLINE TARTRATE 0.5 MG/1
.5-1 TABLET, FILM COATED ORAL SEE ADMIN INSTRUCTIONS
Qty: 57 TABLET | Refills: 0 | Status: SHIPPED | OUTPATIENT
Start: 2025-03-06

## 2025-03-06 RX ORDER — OMEPRAZOLE 40 MG/1
40 CAPSULE, DELAYED RELEASE ORAL
Qty: 90 CAPSULE | Refills: 0 | Status: SHIPPED | OUTPATIENT
Start: 2025-03-06 | End: 2025-06-04

## 2025-03-06 SDOH — ECONOMIC STABILITY: FOOD INSECURITY: WITHIN THE PAST 12 MONTHS, THE FOOD YOU BOUGHT JUST DIDN'T LAST AND YOU DIDN'T HAVE MONEY TO GET MORE.: NEVER TRUE

## 2025-03-06 SDOH — ECONOMIC STABILITY: FOOD INSECURITY: WITHIN THE PAST 12 MONTHS, YOU WORRIED THAT YOUR FOOD WOULD RUN OUT BEFORE YOU GOT MONEY TO BUY MORE.: NEVER TRUE

## 2025-03-06 ASSESSMENT — PATIENT HEALTH QUESTIONNAIRE - PHQ9
8. MOVING OR SPEAKING SO SLOWLY THAT OTHER PEOPLE COULD HAVE NOTICED. OR THE OPPOSITE, BEING SO FIGETY OR RESTLESS THAT YOU HAVE BEEN MOVING AROUND A LOT MORE THAN USUAL: NOT AT ALL
4. FEELING TIRED OR HAVING LITTLE ENERGY: SEVERAL DAYS
SUM OF ALL RESPONSES TO PHQ QUESTIONS 1-9: 2
4. FEELING TIRED OR HAVING LITTLE ENERGY: SEVERAL DAYS
SUM OF ALL RESPONSES TO PHQ QUESTIONS 1-9: 2
10. IF YOU CHECKED OFF ANY PROBLEMS, HOW DIFFICULT HAVE THESE PROBLEMS MADE IT FOR YOU TO DO YOUR WORK, TAKE CARE OF THINGS AT HOME, OR GET ALONG WITH OTHER PEOPLE: NOT DIFFICULT AT ALL
SUM OF ALL RESPONSES TO PHQ QUESTIONS 1-9: 2
6. FEELING BAD ABOUT YOURSELF - OR THAT YOU ARE A FAILURE OR HAVE LET YOURSELF OR YOUR FAMILY DOWN: NOT AT ALL
2. FEELING DOWN, DEPRESSED OR HOPELESS: NOT AT ALL
10. IF YOU CHECKED OFF ANY PROBLEMS, HOW DIFFICULT HAVE THESE PROBLEMS MADE IT FOR YOU TO DO YOUR WORK, TAKE CARE OF THINGS AT HOME, OR GET ALONG WITH OTHER PEOPLE: NOT DIFFICULT AT ALL
9. THOUGHTS THAT YOU WOULD BE BETTER OFF DEAD, OR OF HURTING YOURSELF: NOT AT ALL
5. POOR APPETITE OR OVEREATING: NOT AT ALL
SUM OF ALL RESPONSES TO PHQ QUESTIONS 1-9: 2
6. FEELING BAD ABOUT YOURSELF - OR THAT YOU ARE A FAILURE OR HAVE LET YOURSELF OR YOUR FAMILY DOWN: NOT AT ALL
SUM OF ALL RESPONSES TO PHQ QUESTIONS 1-9: 2
8. MOVING OR SPEAKING SO SLOWLY THAT OTHER PEOPLE COULD HAVE NOTICED. OR THE OPPOSITE, BEING SO FIGETY OR RESTLESS THAT YOU HAVE BEEN MOVING AROUND A LOT MORE THAN USUAL: NOT AT ALL
SUM OF ALL RESPONSES TO PHQ QUESTIONS 1-9: 2
9. THOUGHTS THAT YOU WOULD BE BETTER OFF DEAD, OR OF HURTING YOURSELF: NOT AT ALL
2. FEELING DOWN, DEPRESSED OR HOPELESS: NOT AT ALL
1. LITTLE INTEREST OR PLEASURE IN DOING THINGS: NOT AT ALL
5. POOR APPETITE OR OVEREATING: NOT AT ALL
3. TROUBLE FALLING OR STAYING ASLEEP: SEVERAL DAYS
3. TROUBLE FALLING OR STAYING ASLEEP: SEVERAL DAYS
1. LITTLE INTEREST OR PLEASURE IN DOING THINGS: NOT AT ALL

## 2025-03-06 NOTE — PROGRESS NOTES
Visit Information    Have you changed or started any medications since your last visit including any over-the-counter medicines, vitamins, or herbal medicines? no   Have you stopped taking any of your medications? Is so, why? -  no  Are you having any side effects from any of your medications? - no    Have you seen any other physician or provider since your last visit?  no   Have you had any other diagnostic tests since your last visit?  no   Have you been seen in the emergency room and/or had an admission in a hospital since we last saw you?  no   Have you had your routine dental cleaning in the past 6 months?  no     Do you have an active MyChart account? If no, what is the barrier?  Yes    Patient Care Team:  Ivan Meza MD as PCP - General (Family Medicine)  Paul Parkinson DO as PCP - Empaneled Provider    Medical History Review  Past Medical, Family, and Social History reviewed and does not contribute to the patient presenting condition    Health Maintenance   Topic Date Due    Hepatitis C screen  Never done    Hepatitis B vaccine (1 of 3 - 19+ 3-dose series) Never done    Shingles vaccine (1 of 2) Never done    COVID-19 Vaccine (5 - 2024-25 season) 09/01/2024    Lipids  09/16/2024    A1C test (Diabetic or Prediabetic)  02/09/2025    Annual Wellness Visit (Medicare)  08/31/2025    Depression Monitoring  10/24/2025    Colorectal Cancer Screen  02/14/2027    DTaP/Tdap/Td vaccine (3 - Td or Tdap) 11/27/2027    Flu vaccine  Completed    Pneumococcal 50+ years Vaccine  Completed    HIV screen  Completed    Hepatitis A vaccine  Aged Out    Hib vaccine  Aged Out    Polio vaccine  Aged Out    Meningococcal (ACWY) vaccine  Aged Out    Depression Screen  Discontinued    Pneumococcal 0-49 years Vaccine  Discontinued    Diabetes screen  Discontinued
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
## 2025-03-06 NOTE — PROGRESS NOTES
ATTENDING NOTE    Attending Physician Statement  I have discussed the care of ShayElliottincluding pertinent history and exam findings,  with the resident. I have reviewed the key elements of all parts of the encounter with the resident.  I agree with the assessment, plan and orders as documented by the resident.  (GE Modifier)    Shay was seen today for nicotine dependence, gastroesophageal reflux and shortness of breath.    Diagnoses and all orders for this visit:    Cigarette nicotine dependence without complication  -     varenicline (CHANTIX) 0.5 MG tablet; Take 1-2 tablets by mouth See Admin Instructions 0.5mg DAILY for 3 days followed by 0.5mg TWICE DAILY for 4 days followed by 1mg TWICE DAILY  -     nicotine polacrilex (NICORETTE) 2 MG gum; Take 1 each by mouth as needed for Smoking cessation    Gastroesophageal reflux disease without esophagitis  -     CBC with Auto Differential; Future  -     Basic Metabolic Panel; Future    Screening for hyperlipidemia  -     Lipid Panel; Future  -     omeprazole (PRILOSEC) 40 MG delayed release capsule; Take 1 capsule by mouth every morning (before breakfast)    Screening for diabetes mellitus (DM)  -     POCT glycosylated hemoglobin (Hb A1C)    Other orders  -     ibuprofen (IBU) 800 MG tablet; Take 1 po tid PRN

## 2025-03-06 NOTE — PATIENT INSTRUCTIONS
Thank you for letting us take care of you today. We hope all your questions were addressed. If a question was overlooked or something else comes to mind after you return home, please contact a member of your Care Team listed below.      Your Care Team at [facility] is Team #  Wilson Troncoso M.D. (Faculty)  Don Girard M.D. (Resident)  Ivan Meza M.D. (Resident)   Joanie Weber M.D. (Resident)  Randall Coelho M.D. (Resident)  Inocencia Cano M.D. (Resident)  Joselyn Austin., [facility]  Amparo Maki, [facility]  Radha Guzman, [facility]  John Watson, [facility]  Bhargavi La, [facility]  Dorothy Camacho, [facility]  Shonna King (LJ) LAUREL Parekh (Clinical Practice Manager)  Huyen Todd [facility] (Clinical Pharmacist)     Office phone number: [phone]    If you need to get in right away due to illness, please be advised we have \"Same Day\" appointments available Monday-Friday. Please call us at [phone] option #[phone] to schedule your \"Same Day\" appointment.

## 2025-03-21 DIAGNOSIS — F17.210 CIGARETTE NICOTINE DEPENDENCE WITHOUT COMPLICATION: ICD-10-CM

## 2025-03-21 NOTE — TELEPHONE ENCOUNTER
Last visit: 03/06/2025  Last Med refill: 03/06/2025  Does patient have enough medication for 72 hours: No:     Next Visit Date:  Future Appointments   Date Time Provider Department Center   4/9/2025  3:00 PM John Mantilla MD [facility] -       Health Maintenance   Topic Date Due    Hepatitis C screen  Never done    Hepatitis B vaccine (1 of 3 - 19+ 3-dose series) Never done    Shingles vaccine (1 of 2) Never done    COVID-19 Vaccine (5 - 2024-25 season) 09/01/2024    Lipids  09/16/2024    Annual Wellness Visit (Medicare)  08/31/2025    A1C test (Diabetic or Prediabetic)  03/06/2026    Depression Monitoring  03/06/2026    Colorectal Cancer Screen  02/14/2027    DTaP/Tdap/Td vaccine (3 - Td or Tdap) 11/27/2027    Flu vaccine  Completed    Pneumococcal 50+ years Vaccine  Completed    HIV screen  Completed    Hepatitis A vaccine  Aged Out    Hib vaccine  Aged Out    Polio vaccine  Aged Out    Meningococcal (ACWY) vaccine  Aged Out    Meningococcal B vaccine  Aged Out    Depression Screen  Discontinued    Pneumococcal 0-49 years Vaccine  Discontinued    Diabetes screen  Discontinued       Hemoglobin A1C (%)   Date Value   03/06/2025 5.7   02/09/2024 5.7   01/11/2021 5.4             ( goal A1C is < 7)   No components found for: \"LABMICR\"  No components found for: \"LDLCHOLESTEROL\", \"LDLCALC\"    (goal LDL is <100)   AST (U/L)   Date Value   09/16/2019 26     ALT (U/L)   Date Value   09/16/2019 31     BUN (mg/dL)   Date Value   09/16/2019 12     BP Readings from Last 3 Encounters:   03/06/25 122/68   10/24/24 112/65   08/30/24 123/82          (goal 120/80)    All Future Testing planned in [facility]  Lab Frequency Next Occurrence   Lipid Panel Once 10/24/2024   Lipid Panel Once 03/06/2025   CBC with Auto Differential Once 03/06/2025   Basic Metabolic Panel Once 03/06/2025               Patient Active Problem List:     Cervical transverse process fracture (HCC)     Closed nondisplaced fracture of sixth cervical

## 2025-03-28 DIAGNOSIS — Z76.0 MEDICATION REFILL: ICD-10-CM

## 2025-03-31 RX ORDER — VENLAFAXINE HYDROCHLORIDE 37.5 MG/1
37.5 CAPSULE, EXTENDED RELEASE ORAL DAILY
Qty: 90 CAPSULE | Refills: 0 | Status: SHIPPED | OUTPATIENT
Start: 2025-03-31
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
## 2025-04-09 ENCOUNTER — OFFICE VISIT (OUTPATIENT)
Dept: NEUROLOGY | Age: 55
End: 2025-04-09
Payer: MEDICARE

## 2025-04-09 VITALS
HEIGHT: 69 IN | DIASTOLIC BLOOD PRESSURE: 75 MMHG | HEART RATE: 90 BPM | BODY MASS INDEX: 30.84 KG/M2 | WEIGHT: 208.2 LBS | SYSTOLIC BLOOD PRESSURE: 120 MMHG

## 2025-04-09 DIAGNOSIS — M54.16 LUMBAR RADICULOPATHY: ICD-10-CM

## 2025-04-09 DIAGNOSIS — M54.2 CHRONIC NECK PAIN: ICD-10-CM

## 2025-04-09 DIAGNOSIS — G89.29 CHRONIC NECK PAIN: ICD-10-CM

## 2025-04-09 DIAGNOSIS — M54.40 CHRONIC LOW BACK PAIN WITH SCIATICA, SCIATICA LATERALITY UNSPECIFIED, UNSPECIFIED BACK PAIN LATERALITY: Primary | ICD-10-CM

## 2025-04-09 DIAGNOSIS — G89.29 CHRONIC LOW BACK PAIN WITH SCIATICA, SCIATICA LATERALITY UNSPECIFIED, UNSPECIFIED BACK PAIN LATERALITY: Primary | ICD-10-CM

## 2025-04-09 PROCEDURE — 3017F COLORECTAL CA SCREEN DOC REV: CPT | Performed by: PSYCHIATRY & NEUROLOGY

## 2025-04-09 PROCEDURE — 1036F TOBACCO NON-USER: CPT | Performed by: PSYCHIATRY & NEUROLOGY

## 2025-04-09 PROCEDURE — 99214 OFFICE O/P EST MOD 30 MIN: CPT | Performed by: PSYCHIATRY & NEUROLOGY

## 2025-04-09 PROCEDURE — G8417 CALC BMI ABV UP PARAM F/U: HCPCS | Performed by: PSYCHIATRY & NEUROLOGY

## 2025-04-09 PROCEDURE — G8427 DOCREV CUR MEDS BY ELIG CLIN: HCPCS | Performed by: PSYCHIATRY & NEUROLOGY

## 2025-04-09 ASSESSMENT — ENCOUNTER SYMPTOMS
COUGH: 1
BACK PAIN: 1
ALLERGIC/IMMUNOLOGIC NEGATIVE: 1
GASTROINTESTINAL NEGATIVE: 1

## 2025-04-09 NOTE — PROGRESS NOTES
Muscle strength 5/5 strength throughout                                                                                No dysmetria or dysdiadokinesis  No tremor   Normal fine motor  Gait normal   Orientation Alert and oriented x 3 . WORLD able to spell forwards and backwards . Serial 7 to 93   Attention and concentration normal  Short term memory 2 words out of 3 in one minute   Language process and speech normal . No aphasia   Cranial nerve 2 normal acuety and visual fields  Cranial nerve 3, 4 and 6 .Extraocular muscles are intact . Pupils are equal and reactive   Cranial nerve 5 .Normal strength of masseter and temporalis . Intact corneal reflex. Normal facial sensation  Cranial nerve 7 normal exam   Cranial nerve 8. Grossly intact hearing   Cranial nerve 9 and 10. Symmetric palate elevation   Cranial nerve 11 , 5 out of 5 strength   Cranial Nerve 12 midline tongue . No atrophy  Sensation .Decrease pinprick and light touch outer right thigh   Deep Tendon Reflexes normal  Plantar response flexor bilaterally      ASSESSMENT/PLAN      Diagnosis Orders   1. Chronic low back pain with sciatica, sciatica laterality unspecified, unspecified back pain laterality        2. Lumbar radiculopathy        3. Chronic neck pain          He is to continue current medication regimen        As above

## 2025-06-05 DIAGNOSIS — Z13.220 SCREENING FOR HYPERLIPIDEMIA: ICD-10-CM

## 2025-06-05 RX ORDER — OMEPRAZOLE 40 MG/1
40 CAPSULE, DELAYED RELEASE ORAL
Qty: 90 CAPSULE | Refills: 0 | Status: SHIPPED | OUTPATIENT
Start: 2025-06-05
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
## 2025-06-26 DIAGNOSIS — Z76.0 MEDICATION REFILL: ICD-10-CM
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
## 2025-06-30 RX ORDER — VENLAFAXINE HYDROCHLORIDE 37.5 MG/1
37.5 CAPSULE, EXTENDED RELEASE ORAL DAILY
Qty: 90 CAPSULE | Refills: 0 | Status: SHIPPED | OUTPATIENT
Start: 2025-06-30

## (undated) DEVICE — GLOVE SURG SZ 65 THK91MIL LTX FREE SYN POLYISOPRENE

## (undated) DEVICE — SUTURE PERMAHAND SZ 2-0 L30IN 10X30IN TIE NONABSORBABLE BLK SA85H

## (undated) DEVICE — PEN: MARKING STD 100/CS: Brand: MEDICAL ACTION INDUSTRIES

## (undated) DEVICE — GOWN,AURORA,NONRNF,XL,30/CS: Brand: MEDLINE

## (undated) DEVICE — DRAPE C ARM UNIV W41XL74IN CLR PLAS XR VELC CLSR POLY STRP

## (undated) DEVICE — Device

## (undated) DEVICE — GAUZE,SPONGE,4"X4",16PLY,XRAY,STRL,LF: Brand: MEDLINE

## (undated) DEVICE — TOWEL,OR,DSP,ST,BLUE,DLX,XR,4/PK,20PK/CS: Brand: MEDLINE

## (undated) DEVICE — CODMAN® SURGICAL PATTIES 1/2" X 1/2" (1.27CM X 1.27CM): Brand: CODMAN®

## (undated) DEVICE — DISCONTINUED USE 394504 SYRINGE LUER LOCK TIP 12 ML

## (undated) DEVICE — C-ARMOR C-ARM EQUIPMENT COVERS CLEAR STERILE UNIVERSAL FIT 12 PER CASE: Brand: C-ARMOR

## (undated) DEVICE — SINGLE DOSE EPI TY

## (undated) DEVICE — BAG 46124 BECKER DRAINAGE EDMS: Brand: BECKER®

## (undated) DEVICE — DALE FOLEY CATHETER HOLDER, LEGBAND, FITS UP TO 30": Brand: DALE FOLEY CATHETER HOLDER

## (undated) DEVICE — GAMMEX® NON-LATEX SIZE 8, STERILE NEOPRENE POWDER-FREE SURGICAL GLOVE: Brand: GAMMEX

## (undated) DEVICE — SUTURE PERMAHAND SZ 2-0 L30IN NONABSORBABLE BLK L17MM RB-1 K873H

## (undated) DEVICE — OIL CARTRIDGE: Brand: CORE, MAESTRO

## (undated) DEVICE — ABS MED DISTRACTION PIN , 12MM POUCH
Type: IMPLANTABLE DEVICE | Status: NON-FUNCTIONAL
Brand: ABS MED DISTRACTION PIN
Removed: 2017-09-07

## (undated) DEVICE — BLADE ES ELASTOMERIC COAT INSUL DURABLE BEND UPTO 90DEG

## (undated) DEVICE — ABSORBENT, WATERPROOF, BACTERIA PROOF FILM DRESSING: Brand: OPSITE POST OP 9.5X8.5CM CTN 20

## (undated) DEVICE — SUTURE MCRYL SZ 4-0 L18IN ABSRB UD L16MM PC-3 3/8 CIR PRIM Y845G

## (undated) DEVICE — Z DISCONTINUED APPLICATOR SURG PREP 0.35OZ 2% CHG 70% ISO ALC W/ HI LT

## (undated) DEVICE — TOWEL SURG W16XL26IN WHT NONFENESTRATED ST 4 PER PK

## (undated) DEVICE — GOWN,AURORA,NONREINFORCED,LARGE: Brand: MEDLINE

## (undated) DEVICE — DRAPE MICSCP W117XL305CM DIA65MM LENS W VARI LENS2 FOR LEICA

## (undated) DEVICE — CONTROL SYRINGE LUER-LOCK TIP: Brand: MONOJECT

## (undated) DEVICE — CONVERTED USE 338908 SPONGES LAP 18X18 ST

## (undated) DEVICE — NEEDLE SPNL 22GA L3.5IN BLK HUB S STL REG WALL FIT STYL W/

## (undated) DEVICE — ELECTRODE PT RET AD L9FT HI MOIST COND ADH HYDRGEL CORDED

## (undated) DEVICE — DIFFUSER: Brand: CORE, MAESTRO

## (undated) DEVICE — DRAPE,REIN 53X77,STERILE: Brand: MEDLINE

## (undated) DEVICE — NEEDLE SPINAL 22GA L3.5IN SPINOCAN

## (undated) DEVICE — SPONGE: SPECIALTY PEANUT XR 100/CS: Brand: MEDICAL ACTION INDUSTRIES

## (undated) DEVICE — DRAIN SURG L49IN DIA1/8IN 10IN H SIL W/O TRCR END PERF

## (undated) DEVICE — INTENDED FOR TISSUE SEPARATION, AND OTHER PROCEDURES THAT REQUIRE A SHARP SURGICAL BLADE TO PUNCTURE OR CUT.: Brand: BARD-PARKER ® CARBON RIB-BACK BLADES

## (undated) DEVICE — 3M™ IOBAN™ 2 ANTIMICROBIAL INCISE DRAPE 6650EZ: Brand: IOBAN™ 2

## (undated) DEVICE — GLOVE ORANGE PI 7   MSG9070

## (undated) DEVICE — ADHESIVE SKIN CLSR 0.7ML TOP DERMBND ADV

## (undated) DEVICE — TRAY CATHETER 16FR F INCLUDE BARDX IC COMPLT CARE DRNGE BG

## (undated) DEVICE — COVER,MAYO STAND,STERILE: Brand: MEDLINE

## (undated) DEVICE — 3.0MM PRECISION NEURO (MATCH HEAD)

## (undated) DEVICE — CONNECTOR TBNG WHT PLAS SUCT STR 5IN1 LTWT W/ M CONN

## (undated) DEVICE — HEADREST NEURO DONUT 7 IN

## (undated) DEVICE — PACK PROCEDURE SURG LUMBAR SPINE SVMMC

## (undated) DEVICE — SHEET, ORTHO, SPLIT, STERILE: Brand: MEDLINE

## (undated) DEVICE — 3M™ STERI-STRIP™ COMPOUND BENZOIN TINCTURE 40 BAGS/CARTON 4 CARTONS/CASE C1544: Brand: 3M™ STERI-STRIP™

## (undated) DEVICE — SUTURE VCRL SZ 3-0 L18IN ABSRB UD L26MM SH 1/2 CIR J864D